# Patient Record
Sex: FEMALE | Race: WHITE | NOT HISPANIC OR LATINO | Employment: OTHER | URBAN - METROPOLITAN AREA
[De-identification: names, ages, dates, MRNs, and addresses within clinical notes are randomized per-mention and may not be internally consistent; named-entity substitution may affect disease eponyms.]

---

## 2017-01-06 ENCOUNTER — COMMUNICATION - HEALTHEAST (OUTPATIENT)
Dept: INTERNAL MEDICINE | Facility: CLINIC | Age: 77
End: 2017-01-06

## 2017-01-11 ENCOUNTER — COMMUNICATION - HEALTHEAST (OUTPATIENT)
Dept: INTERNAL MEDICINE | Facility: CLINIC | Age: 77
End: 2017-01-11

## 2017-01-30 ENCOUNTER — COMMUNICATION - HEALTHEAST (OUTPATIENT)
Dept: INTERNAL MEDICINE | Facility: CLINIC | Age: 77
End: 2017-01-30

## 2017-02-06 ENCOUNTER — COMMUNICATION - HEALTHEAST (OUTPATIENT)
Dept: INTERNAL MEDICINE | Facility: CLINIC | Age: 77
End: 2017-02-06

## 2017-02-20 ENCOUNTER — AMBULATORY - HEALTHEAST (OUTPATIENT)
Dept: NURSING | Facility: CLINIC | Age: 77
End: 2017-02-20

## 2017-02-20 ENCOUNTER — AMBULATORY - HEALTHEAST (OUTPATIENT)
Dept: LAB | Facility: CLINIC | Age: 77
End: 2017-02-20

## 2017-02-20 ENCOUNTER — COMMUNICATION - HEALTHEAST (OUTPATIENT)
Dept: INTERNAL MEDICINE | Facility: CLINIC | Age: 77
End: 2017-02-20

## 2017-02-20 DIAGNOSIS — M81.0 OSTEOPOROSIS: ICD-10-CM

## 2017-03-08 ENCOUNTER — COMMUNICATION - HEALTHEAST (OUTPATIENT)
Dept: INTERNAL MEDICINE | Facility: CLINIC | Age: 77
End: 2017-03-08

## 2017-03-08 DIAGNOSIS — M81.0 OSTEOPOROSIS, UNSPECIFIED: ICD-10-CM

## 2017-04-10 ENCOUNTER — COMMUNICATION - HEALTHEAST (OUTPATIENT)
Dept: INTERNAL MEDICINE | Facility: CLINIC | Age: 77
End: 2017-04-10

## 2017-04-10 DIAGNOSIS — E55.9 VITAMIN D DEFICIENCY: ICD-10-CM

## 2017-04-10 DIAGNOSIS — R53.83 FATIGUE, UNSPECIFIED TYPE: ICD-10-CM

## 2017-04-12 ENCOUNTER — OFFICE VISIT - HEALTHEAST (OUTPATIENT)
Dept: INTERNAL MEDICINE | Facility: CLINIC | Age: 77
End: 2017-04-12

## 2017-04-12 DIAGNOSIS — K80.12 CALCULUS OF GALLBLADDER WITH ACUTE ON CHRONIC CHOLECYSTITIS WITHOUT OBSTRUCTION: ICD-10-CM

## 2017-04-12 DIAGNOSIS — R53.83 FATIGUE, UNSPECIFIED TYPE: ICD-10-CM

## 2017-04-12 DIAGNOSIS — M81.0 OSTEOPOROSIS: ICD-10-CM

## 2017-04-12 DIAGNOSIS — E55.9 VITAMIN D DEFICIENCY: ICD-10-CM

## 2017-04-12 DIAGNOSIS — R10.11 RIGHT UPPER QUADRANT ABDOMINAL PAIN: ICD-10-CM

## 2017-04-12 DIAGNOSIS — Z00.00 HEALTHCARE MAINTENANCE: ICD-10-CM

## 2017-04-12 LAB
CHOLEST SERPL-MCNC: 179 MG/DL
FASTING STATUS PATIENT QL REPORTED: NORMAL
HDLC SERPL-MCNC: 66 MG/DL
LDLC SERPL CALC-MCNC: 101 MG/DL
TRIGL SERPL-MCNC: 62 MG/DL

## 2017-04-12 ASSESSMENT — MIFFLIN-ST. JEOR: SCORE: 898.82

## 2017-04-14 ENCOUNTER — COMMUNICATION - HEALTHEAST (OUTPATIENT)
Dept: INTERNAL MEDICINE | Facility: CLINIC | Age: 77
End: 2017-04-14

## 2017-04-19 ENCOUNTER — RECORDS - HEALTHEAST (OUTPATIENT)
Dept: ADMINISTRATIVE | Facility: OTHER | Age: 77
End: 2017-04-19

## 2017-04-19 ENCOUNTER — RECORDS - HEALTHEAST (OUTPATIENT)
Dept: BONE DENSITY | Facility: CLINIC | Age: 77
End: 2017-04-19

## 2017-04-19 DIAGNOSIS — M81.0 AGE-RELATED OSTEOPOROSIS WITHOUT CURRENT PATHOLOGICAL FRACTURE: ICD-10-CM

## 2017-04-21 ENCOUNTER — COMMUNICATION - HEALTHEAST (OUTPATIENT)
Dept: INTERNAL MEDICINE | Facility: CLINIC | Age: 77
End: 2017-04-21

## 2017-04-21 DIAGNOSIS — R26.89 IMBALANCE: ICD-10-CM

## 2017-04-28 ENCOUNTER — COMMUNICATION - HEALTHEAST (OUTPATIENT)
Dept: INTERNAL MEDICINE | Facility: CLINIC | Age: 77
End: 2017-04-28

## 2017-05-01 ENCOUNTER — RECORDS - HEALTHEAST (OUTPATIENT)
Dept: ADMINISTRATIVE | Facility: OTHER | Age: 77
End: 2017-05-01

## 2017-05-03 ENCOUNTER — COMMUNICATION - HEALTHEAST (OUTPATIENT)
Dept: INTERNAL MEDICINE | Facility: CLINIC | Age: 77
End: 2017-05-03

## 2017-05-03 ENCOUNTER — OFFICE VISIT - HEALTHEAST (OUTPATIENT)
Dept: PHYSICAL THERAPY | Facility: REHABILITATION | Age: 77
End: 2017-05-03

## 2017-05-03 DIAGNOSIS — R26.89 IMBALANCE: ICD-10-CM

## 2017-05-08 ENCOUNTER — RECORDS - HEALTHEAST (OUTPATIENT)
Dept: ADMINISTRATIVE | Facility: OTHER | Age: 77
End: 2017-05-08

## 2017-05-08 ENCOUNTER — COMMUNICATION - HEALTHEAST (OUTPATIENT)
Dept: INTERNAL MEDICINE | Facility: CLINIC | Age: 77
End: 2017-05-08

## 2017-05-09 ENCOUNTER — AMBULATORY - HEALTHEAST (OUTPATIENT)
Dept: INTERNAL MEDICINE | Facility: CLINIC | Age: 77
End: 2017-05-09

## 2017-05-09 ENCOUNTER — COMMUNICATION - HEALTHEAST (OUTPATIENT)
Dept: INTERNAL MEDICINE | Facility: CLINIC | Age: 77
End: 2017-05-09

## 2017-05-09 DIAGNOSIS — F41.9 ANXIETY: ICD-10-CM

## 2017-05-15 ENCOUNTER — COMMUNICATION - HEALTHEAST (OUTPATIENT)
Dept: SCHEDULING | Facility: CLINIC | Age: 77
End: 2017-05-15

## 2017-05-15 ENCOUNTER — OFFICE VISIT - HEALTHEAST (OUTPATIENT)
Dept: INTERNAL MEDICINE | Facility: CLINIC | Age: 77
End: 2017-05-15

## 2017-05-15 DIAGNOSIS — K80.12 CALCULUS OF GALLBLADDER WITH ACUTE ON CHRONIC CHOLECYSTITIS WITHOUT OBSTRUCTION: ICD-10-CM

## 2017-05-15 DIAGNOSIS — Z00.00 HEALTHCARE MAINTENANCE: ICD-10-CM

## 2017-05-15 DIAGNOSIS — R00.2 PALPITATIONS: ICD-10-CM

## 2017-05-15 ASSESSMENT — MIFFLIN-ST. JEOR: SCORE: 896.55

## 2017-05-16 LAB
ATRIAL RATE - MUSE: 61 BPM
DIASTOLIC BLOOD PRESSURE - MUSE: NORMAL MMHG
INTERPRETATION ECG - MUSE: NORMAL
P AXIS - MUSE: 43 DEGREES
PR INTERVAL - MUSE: 178 MS
QRS DURATION - MUSE: 84 MS
QT - MUSE: 388 MS
QTC - MUSE: 390 MS
R AXIS - MUSE: 17 DEGREES
SYSTOLIC BLOOD PRESSURE - MUSE: NORMAL MMHG
T AXIS - MUSE: 47 DEGREES
VENTRICULAR RATE- MUSE: 61 BPM

## 2017-05-17 ENCOUNTER — HOSPITAL ENCOUNTER (OUTPATIENT)
Dept: NUCLEAR MEDICINE | Facility: CLINIC | Age: 77
Discharge: HOME | End: 2017-05-17
Attending: SURGERY

## 2017-05-17 DIAGNOSIS — K80.20 CALCULUS OF GALLBLADDER WITHOUT CHOLECYSTITIS WITHOUT OBSTRUCTION: ICD-10-CM

## 2017-05-18 ENCOUNTER — OFFICE VISIT - HEALTHEAST (OUTPATIENT)
Dept: CARDIOLOGY | Facility: CLINIC | Age: 77
End: 2017-05-18

## 2017-05-18 DIAGNOSIS — R00.2 PALPITATION: ICD-10-CM

## 2017-05-18 DIAGNOSIS — I48.92 ATRIAL FLUTTER (H): ICD-10-CM

## 2017-05-18 LAB
ATRIAL RATE - MUSE: 64 BPM
DIASTOLIC BLOOD PRESSURE - MUSE: NORMAL MMHG
INTERPRETATION ECG - MUSE: NORMAL
P AXIS - MUSE: 58 DEGREES
PR INTERVAL - MUSE: 176 MS
QRS DURATION - MUSE: 82 MS
QT - MUSE: 388 MS
QTC - MUSE: 400 MS
R AXIS - MUSE: 7 DEGREES
SYSTOLIC BLOOD PRESSURE - MUSE: NORMAL MMHG
T AXIS - MUSE: 40 DEGREES
VENTRICULAR RATE- MUSE: 64 BPM

## 2017-05-18 ASSESSMENT — MIFFLIN-ST. JEOR: SCORE: 895.19

## 2017-05-22 ENCOUNTER — HOSPITAL ENCOUNTER (OUTPATIENT)
Dept: CARDIOLOGY | Facility: CLINIC | Age: 77
Discharge: HOME | End: 2017-05-22
Attending: INTERNAL MEDICINE

## 2017-05-22 DIAGNOSIS — R00.2 PALPITATION: ICD-10-CM

## 2017-05-22 DIAGNOSIS — I48.92 ATRIAL FLUTTER (H): ICD-10-CM

## 2017-05-29 ENCOUNTER — COMMUNICATION - HEALTHEAST (OUTPATIENT)
Dept: CARDIOLOGY | Facility: CLINIC | Age: 77
End: 2017-05-29

## 2017-05-29 ENCOUNTER — COMMUNICATION - HEALTHEAST (OUTPATIENT)
Dept: INTERNAL MEDICINE | Facility: CLINIC | Age: 77
End: 2017-05-29

## 2017-05-30 ENCOUNTER — COMMUNICATION - HEALTHEAST (OUTPATIENT)
Dept: INTERNAL MEDICINE | Facility: CLINIC | Age: 77
End: 2017-05-30

## 2017-05-30 ENCOUNTER — AMBULATORY - HEALTHEAST (OUTPATIENT)
Dept: INTERNAL MEDICINE | Facility: CLINIC | Age: 77
End: 2017-05-30

## 2017-05-31 ENCOUNTER — COMMUNICATION - HEALTHEAST (OUTPATIENT)
Dept: CARDIOLOGY | Facility: CLINIC | Age: 77
End: 2017-05-31

## 2017-05-31 ENCOUNTER — RECORDS - HEALTHEAST (OUTPATIENT)
Dept: ADMINISTRATIVE | Facility: OTHER | Age: 77
End: 2017-05-31

## 2017-05-31 DIAGNOSIS — R00.2 PALPITATIONS: ICD-10-CM

## 2017-06-02 ENCOUNTER — COMMUNICATION - HEALTHEAST (OUTPATIENT)
Dept: CARDIOLOGY | Facility: CLINIC | Age: 77
End: 2017-06-02

## 2017-06-02 DIAGNOSIS — R00.2 PALPITATIONS: ICD-10-CM

## 2017-06-04 ENCOUNTER — COMMUNICATION - HEALTHEAST (OUTPATIENT)
Dept: CARDIOLOGY | Facility: CLINIC | Age: 77
End: 2017-06-04

## 2017-06-05 ENCOUNTER — COMMUNICATION - HEALTHEAST (OUTPATIENT)
Dept: CARDIOLOGY | Facility: CLINIC | Age: 77
End: 2017-06-05

## 2017-06-10 ENCOUNTER — COMMUNICATION - HEALTHEAST (OUTPATIENT)
Dept: CARDIOLOGY | Facility: CLINIC | Age: 77
End: 2017-06-10

## 2017-06-13 ENCOUNTER — COMMUNICATION - HEALTHEAST (OUTPATIENT)
Dept: CARDIOLOGY | Facility: CLINIC | Age: 77
End: 2017-06-13

## 2017-06-23 ENCOUNTER — COMMUNICATION - HEALTHEAST (OUTPATIENT)
Dept: CARDIOLOGY | Facility: CLINIC | Age: 77
End: 2017-06-23

## 2017-06-27 ENCOUNTER — COMMUNICATION - HEALTHEAST (OUTPATIENT)
Dept: CARDIOLOGY | Facility: CLINIC | Age: 77
End: 2017-06-27

## 2017-07-12 ENCOUNTER — COMMUNICATION - HEALTHEAST (OUTPATIENT)
Dept: INTERNAL MEDICINE | Facility: CLINIC | Age: 77
End: 2017-07-12

## 2017-07-12 DIAGNOSIS — M81.0 OSTEOPOROSIS, UNSPECIFIED: ICD-10-CM

## 2017-07-14 ENCOUNTER — HOSPITAL ENCOUNTER (OUTPATIENT)
Dept: CARDIOLOGY | Facility: CLINIC | Age: 77
Discharge: HOME | End: 2017-07-14
Attending: INTERNAL MEDICINE

## 2017-07-14 DIAGNOSIS — R00.2 PALPITATIONS: ICD-10-CM

## 2017-07-31 ENCOUNTER — COMMUNICATION - HEALTHEAST (OUTPATIENT)
Dept: INTERNAL MEDICINE | Facility: CLINIC | Age: 77
End: 2017-07-31

## 2017-07-31 ENCOUNTER — AMBULATORY - HEALTHEAST (OUTPATIENT)
Dept: INTERNAL MEDICINE | Facility: CLINIC | Age: 77
End: 2017-07-31

## 2017-07-31 DIAGNOSIS — R19.7 DIARRHEA: ICD-10-CM

## 2017-08-06 ENCOUNTER — RECORDS - HEALTHEAST (OUTPATIENT)
Dept: ADMINISTRATIVE | Facility: OTHER | Age: 77
End: 2017-08-06

## 2017-08-21 ENCOUNTER — AMBULATORY - HEALTHEAST (OUTPATIENT)
Dept: NURSING | Facility: CLINIC | Age: 77
End: 2017-08-21

## 2017-09-29 ENCOUNTER — OFFICE VISIT - HEALTHEAST (OUTPATIENT)
Dept: CARDIOLOGY | Facility: CLINIC | Age: 77
End: 2017-09-29

## 2017-09-29 DIAGNOSIS — R42 DIZZINESS AND GIDDINESS: ICD-10-CM

## 2017-09-29 DIAGNOSIS — R00.2 PALPITATIONS: ICD-10-CM

## 2017-09-29 DIAGNOSIS — I47.19 ECTOPIC ATRIAL TACHYCARDIA (H): ICD-10-CM

## 2017-09-29 ASSESSMENT — MIFFLIN-ST. JEOR: SCORE: 906.08

## 2017-10-28 ENCOUNTER — COMMUNICATION - HEALTHEAST (OUTPATIENT)
Dept: INTERNAL MEDICINE | Facility: CLINIC | Age: 77
End: 2017-10-28

## 2017-10-28 DIAGNOSIS — M81.0 OSTEOPOROSIS: ICD-10-CM

## 2017-11-15 ENCOUNTER — RECORDS - HEALTHEAST (OUTPATIENT)
Dept: ADMINISTRATIVE | Facility: OTHER | Age: 77
End: 2017-11-15

## 2018-01-30 ENCOUNTER — COMMUNICATION - HEALTHEAST (OUTPATIENT)
Dept: SCHEDULING | Facility: CLINIC | Age: 78
End: 2018-01-30

## 2018-01-30 DIAGNOSIS — G47.00 INSOMNIA: ICD-10-CM

## 2018-02-01 ENCOUNTER — COMMUNICATION - HEALTHEAST (OUTPATIENT)
Dept: INTERNAL MEDICINE | Facility: CLINIC | Age: 78
End: 2018-02-01

## 2018-02-01 DIAGNOSIS — G47.00 INSOMNIA: ICD-10-CM

## 2018-02-05 ENCOUNTER — COMMUNICATION - HEALTHEAST (OUTPATIENT)
Dept: INTERNAL MEDICINE | Facility: CLINIC | Age: 78
End: 2018-02-05

## 2018-04-12 ENCOUNTER — COMMUNICATION - HEALTHEAST (OUTPATIENT)
Dept: INTERNAL MEDICINE | Facility: CLINIC | Age: 78
End: 2018-04-12

## 2018-04-12 DIAGNOSIS — I25.10 CAD (CORONARY ARTERY DISEASE): ICD-10-CM

## 2018-06-04 ENCOUNTER — OFFICE VISIT - HEALTHEAST (OUTPATIENT)
Dept: INTERNAL MEDICINE | Facility: CLINIC | Age: 78
End: 2018-06-04

## 2018-06-04 ENCOUNTER — COMMUNICATION - HEALTHEAST (OUTPATIENT)
Dept: INTERNAL MEDICINE | Facility: CLINIC | Age: 78
End: 2018-06-04

## 2018-06-04 DIAGNOSIS — R41.3 MEMORY CHANGES: ICD-10-CM

## 2018-06-04 DIAGNOSIS — R00.2 PALPITATION: ICD-10-CM

## 2018-06-04 DIAGNOSIS — M81.0 AGE-RELATED OSTEOPOROSIS WITHOUT CURRENT PATHOLOGICAL FRACTURE: ICD-10-CM

## 2018-06-04 LAB
ALBUMIN SERPL-MCNC: 3.9 G/DL (ref 3.5–5)
ALP SERPL-CCNC: 64 U/L (ref 45–120)
ALT SERPL W P-5'-P-CCNC: 13 U/L (ref 0–45)
ANION GAP SERPL CALCULATED.3IONS-SCNC: 8 MMOL/L (ref 5–18)
AST SERPL W P-5'-P-CCNC: 20 U/L (ref 0–40)
BILIRUB SERPL-MCNC: 0.4 MG/DL (ref 0–1)
BUN SERPL-MCNC: 13 MG/DL (ref 8–28)
CALCIUM SERPL-MCNC: 9.6 MG/DL (ref 8.5–10.5)
CHLORIDE BLD-SCNC: 103 MMOL/L (ref 98–107)
CO2 SERPL-SCNC: 28 MMOL/L (ref 22–31)
CREAT SERPL-MCNC: 0.68 MG/DL (ref 0.6–1.1)
ERYTHROCYTE [DISTWIDTH] IN BLOOD BY AUTOMATED COUNT: 12.4 % (ref 11–14.5)
ERYTHROCYTE [SEDIMENTATION RATE] IN BLOOD BY WESTERGREN METHOD: 4 MM/HR (ref 0–20)
GFR SERPL CREATININE-BSD FRML MDRD: >60 ML/MIN/1.73M2
GLUCOSE BLD-MCNC: 81 MG/DL (ref 70–125)
HCT VFR BLD AUTO: 39.7 % (ref 35–47)
HGB BLD-MCNC: 13.6 G/DL (ref 12–16)
MCH RBC QN AUTO: 30.9 PG (ref 27–34)
MCHC RBC AUTO-ENTMCNC: 34.1 G/DL (ref 32–36)
MCV RBC AUTO: 91 FL (ref 80–100)
PLATELET # BLD AUTO: 182 THOU/UL (ref 140–440)
PMV BLD AUTO: 7.8 FL (ref 7–10)
POTASSIUM BLD-SCNC: 4.1 MMOL/L (ref 3.5–5)
PROT SERPL-MCNC: 6.3 G/DL (ref 6–8)
RBC # BLD AUTO: 4.38 MILL/UL (ref 3.8–5.4)
SODIUM SERPL-SCNC: 139 MMOL/L (ref 136–145)
TSH SERPL DL<=0.005 MIU/L-ACNC: 2.3 UIU/ML (ref 0.3–5)
VIT B12 SERPL-MCNC: 418 PG/ML (ref 213–816)
WBC: 5.1 THOU/UL (ref 4–11)

## 2018-06-05 LAB
25(OH)D3 SERPL-MCNC: 55.7 NG/ML (ref 30–80)
25(OH)D3 SERPL-MCNC: 55.7 NG/ML (ref 30–80)

## 2018-06-07 ENCOUNTER — COMMUNICATION - HEALTHEAST (OUTPATIENT)
Dept: CARDIOLOGY | Facility: CLINIC | Age: 78
End: 2018-06-07

## 2018-06-07 DIAGNOSIS — R00.2 PALPITATIONS: ICD-10-CM

## 2018-06-13 ENCOUNTER — COMMUNICATION - HEALTHEAST (OUTPATIENT)
Dept: INTERNAL MEDICINE | Facility: CLINIC | Age: 78
End: 2018-06-13

## 2018-06-13 DIAGNOSIS — M81.0 OSTEOPOROSIS: ICD-10-CM

## 2018-07-15 ENCOUNTER — COMMUNICATION - HEALTHEAST (OUTPATIENT)
Dept: INTERNAL MEDICINE | Facility: CLINIC | Age: 78
End: 2018-07-15

## 2018-07-16 ENCOUNTER — COMMUNICATION - HEALTHEAST (OUTPATIENT)
Dept: INTERNAL MEDICINE | Facility: CLINIC | Age: 78
End: 2018-07-16

## 2018-07-19 ENCOUNTER — RECORDS - HEALTHEAST (OUTPATIENT)
Dept: ADMINISTRATIVE | Facility: OTHER | Age: 78
End: 2018-07-19

## 2018-08-03 ENCOUNTER — COMMUNICATION - HEALTHEAST (OUTPATIENT)
Dept: INTERNAL MEDICINE | Facility: CLINIC | Age: 78
End: 2018-08-03

## 2018-10-29 ENCOUNTER — COMMUNICATION - HEALTHEAST (OUTPATIENT)
Dept: INTERNAL MEDICINE | Facility: CLINIC | Age: 78
End: 2018-10-29

## 2018-10-29 DIAGNOSIS — M81.0 OSTEOPOROSIS: ICD-10-CM

## 2018-11-10 ENCOUNTER — RECORDS - HEALTHEAST (OUTPATIENT)
Dept: ADMINISTRATIVE | Facility: OTHER | Age: 78
End: 2018-11-10

## 2018-11-18 ENCOUNTER — RECORDS - HEALTHEAST (OUTPATIENT)
Dept: ADMINISTRATIVE | Facility: OTHER | Age: 78
End: 2018-11-18

## 2019-01-09 ENCOUNTER — COMMUNICATION - HEALTHEAST (OUTPATIENT)
Dept: CARDIOLOGY | Facility: CLINIC | Age: 79
End: 2019-01-09

## 2019-01-09 DIAGNOSIS — R00.2 PALPITATIONS: ICD-10-CM

## 2019-02-04 ENCOUNTER — COMMUNICATION - HEALTHEAST (OUTPATIENT)
Dept: INTERNAL MEDICINE | Facility: CLINIC | Age: 79
End: 2019-02-04

## 2019-02-04 DIAGNOSIS — M81.0 OSTEOPOROSIS: ICD-10-CM

## 2019-02-22 ENCOUNTER — RECORDS - HEALTHEAST (OUTPATIENT)
Dept: ADMINISTRATIVE | Facility: OTHER | Age: 79
End: 2019-02-22

## 2019-03-07 ENCOUNTER — RECORDS - HEALTHEAST (OUTPATIENT)
Dept: ADMINISTRATIVE | Facility: OTHER | Age: 79
End: 2019-03-07

## 2019-04-17 ENCOUNTER — COMMUNICATION - HEALTHEAST (OUTPATIENT)
Dept: CARDIOLOGY | Facility: CLINIC | Age: 79
End: 2019-04-17

## 2019-04-23 ENCOUNTER — COMMUNICATION - HEALTHEAST (OUTPATIENT)
Dept: CARDIOLOGY | Facility: CLINIC | Age: 79
End: 2019-04-23

## 2019-04-23 ENCOUNTER — AMBULATORY - HEALTHEAST (OUTPATIENT)
Dept: CARDIOLOGY | Facility: CLINIC | Age: 79
End: 2019-04-23

## 2019-04-23 ENCOUNTER — RECORDS - HEALTHEAST (OUTPATIENT)
Dept: ADMINISTRATIVE | Facility: OTHER | Age: 79
End: 2019-04-23

## 2019-04-23 DIAGNOSIS — R00.2 PALPITATIONS: ICD-10-CM

## 2019-04-24 ENCOUNTER — OFFICE VISIT - HEALTHEAST (OUTPATIENT)
Dept: INTERNAL MEDICINE | Facility: CLINIC | Age: 79
End: 2019-04-24

## 2019-04-24 DIAGNOSIS — H02.403 PTOSIS OF BOTH EYELIDS: ICD-10-CM

## 2019-04-24 DIAGNOSIS — M81.0 AGE-RELATED OSTEOPOROSIS WITHOUT CURRENT PATHOLOGICAL FRACTURE: ICD-10-CM

## 2019-04-24 DIAGNOSIS — Z01.818 PRE-OPERATIVE EXAMINATION FOR INTERNAL MEDICINE: ICD-10-CM

## 2019-04-24 DIAGNOSIS — I47.19 ECTOPIC ATRIAL TACHYCARDIA (H): ICD-10-CM

## 2019-04-24 LAB
ANION GAP SERPL CALCULATED.3IONS-SCNC: 11 MMOL/L (ref 5–18)
ATRIAL RATE - MUSE: 64 BPM
BUN SERPL-MCNC: 17 MG/DL (ref 8–28)
CALCIUM SERPL-MCNC: 9.7 MG/DL (ref 8.5–10.5)
CHLORIDE BLD-SCNC: 102 MMOL/L (ref 98–107)
CO2 SERPL-SCNC: 24 MMOL/L (ref 22–31)
CREAT SERPL-MCNC: 0.66 MG/DL (ref 0.6–1.1)
DIASTOLIC BLOOD PRESSURE - MUSE: NORMAL MMHG
ERYTHROCYTE [DISTWIDTH] IN BLOOD BY AUTOMATED COUNT: 11.6 % (ref 11–14.5)
GFR SERPL CREATININE-BSD FRML MDRD: >60 ML/MIN/1.73M2
GLUCOSE BLD-MCNC: 86 MG/DL (ref 70–125)
HCT VFR BLD AUTO: 40 % (ref 35–47)
HGB BLD-MCNC: 13.5 G/DL (ref 12–16)
INTERPRETATION ECG - MUSE: NORMAL
MCH RBC QN AUTO: 30.8 PG (ref 27–34)
MCHC RBC AUTO-ENTMCNC: 33.7 G/DL (ref 32–36)
MCV RBC AUTO: 91 FL (ref 80–100)
P AXIS - MUSE: 21 DEGREES
PLATELET # BLD AUTO: 199 THOU/UL (ref 140–440)
PMV BLD AUTO: 7.6 FL (ref 7–10)
POTASSIUM BLD-SCNC: 3.9 MMOL/L (ref 3.5–5)
PR INTERVAL - MUSE: 182 MS
QRS DURATION - MUSE: 86 MS
QT - MUSE: 392 MS
QTC - MUSE: 404 MS
R AXIS - MUSE: -12 DEGREES
RBC # BLD AUTO: 4.38 MILL/UL (ref 3.8–5.4)
SODIUM SERPL-SCNC: 137 MMOL/L (ref 136–145)
SYSTOLIC BLOOD PRESSURE - MUSE: NORMAL MMHG
T AXIS - MUSE: 42 DEGREES
VENTRICULAR RATE- MUSE: 64 BPM
WBC: 5.6 THOU/UL (ref 4–11)

## 2019-04-24 ASSESSMENT — MIFFLIN-ST. JEOR: SCORE: 909.93

## 2019-04-30 ENCOUNTER — OFFICE VISIT - HEALTHEAST (OUTPATIENT)
Dept: CARDIOLOGY | Facility: CLINIC | Age: 79
End: 2019-04-30

## 2019-04-30 ENCOUNTER — COMMUNICATION - HEALTHEAST (OUTPATIENT)
Dept: INTERNAL MEDICINE | Facility: CLINIC | Age: 79
End: 2019-04-30

## 2019-04-30 DIAGNOSIS — I47.19 ECTOPIC ATRIAL TACHYCARDIA (H): ICD-10-CM

## 2019-04-30 DIAGNOSIS — R00.2 PALPITATIONS: ICD-10-CM

## 2019-04-30 ASSESSMENT — MIFFLIN-ST. JEOR: SCORE: 902.9

## 2019-05-06 ENCOUNTER — RECORDS - HEALTHEAST (OUTPATIENT)
Dept: ADMINISTRATIVE | Facility: OTHER | Age: 79
End: 2019-05-06

## 2019-05-06 ENCOUNTER — RECORDS - HEALTHEAST (OUTPATIENT)
Dept: BONE DENSITY | Facility: CLINIC | Age: 79
End: 2019-05-06

## 2019-05-06 DIAGNOSIS — M81.0 AGE-RELATED OSTEOPOROSIS WITHOUT CURRENT PATHOLOGICAL FRACTURE: ICD-10-CM

## 2019-05-15 ENCOUNTER — RECORDS - HEALTHEAST (OUTPATIENT)
Dept: ADMINISTRATIVE | Facility: OTHER | Age: 79
End: 2019-05-15

## 2019-05-16 ENCOUNTER — COMMUNICATION - HEALTHEAST (OUTPATIENT)
Dept: INTERNAL MEDICINE | Facility: CLINIC | Age: 79
End: 2019-05-16

## 2019-05-16 DIAGNOSIS — M81.0 AGE-RELATED OSTEOPOROSIS WITHOUT CURRENT PATHOLOGICAL FRACTURE: ICD-10-CM

## 2019-05-21 ENCOUNTER — COMMUNICATION - HEALTHEAST (OUTPATIENT)
Dept: INTERNAL MEDICINE | Facility: CLINIC | Age: 79
End: 2019-05-21

## 2019-05-21 DIAGNOSIS — R41.3 MEMORY CHANGES: ICD-10-CM

## 2019-05-29 ENCOUNTER — AMBULATORY - HEALTHEAST (OUTPATIENT)
Dept: PHARMACY | Facility: CLINIC | Age: 79
End: 2019-05-29

## 2019-05-29 DIAGNOSIS — M81.0 AGE-RELATED OSTEOPOROSIS WITHOUT CURRENT PATHOLOGICAL FRACTURE: ICD-10-CM

## 2019-05-30 ENCOUNTER — COMMUNICATION - HEALTHEAST (OUTPATIENT)
Dept: INTERNAL MEDICINE | Facility: CLINIC | Age: 79
End: 2019-05-30

## 2019-08-02 ENCOUNTER — HOSPITAL ENCOUNTER (OUTPATIENT)
Dept: NEUROLOGY | Facility: CLINIC | Age: 79
Setting detail: THERAPIES SERIES
Discharge: STILL ADMITTED OR ADMINISTRATIVE | End: 2019-08-02
Attending: INTERNAL MEDICINE

## 2019-08-02 ENCOUNTER — HOSPITAL ENCOUNTER (OUTPATIENT)
Dept: CT IMAGING | Facility: CLINIC | Age: 79
Discharge: HOME | End: 2019-08-02
Attending: PSYCHIATRY & NEUROLOGY

## 2019-08-02 DIAGNOSIS — G31.84 MCI (MILD COGNITIVE IMPAIRMENT): ICD-10-CM

## 2019-08-05 ENCOUNTER — COMMUNICATION - HEALTHEAST (OUTPATIENT)
Dept: SCHEDULING | Facility: CLINIC | Age: 79
End: 2019-08-05

## 2019-08-12 ENCOUNTER — OFFICE VISIT - HEALTHEAST (OUTPATIENT)
Dept: PHARMACY | Facility: CLINIC | Age: 79
End: 2019-08-12

## 2019-08-12 DIAGNOSIS — M81.0 AGE-RELATED OSTEOPOROSIS WITHOUT CURRENT PATHOLOGICAL FRACTURE: ICD-10-CM

## 2019-08-19 ENCOUNTER — AMBULATORY - HEALTHEAST (OUTPATIENT)
Dept: NURSING | Facility: CLINIC | Age: 79
End: 2019-08-19

## 2019-08-22 ENCOUNTER — HOSPITAL ENCOUNTER (OUTPATIENT)
Dept: NEUROLOGY | Facility: CLINIC | Age: 79
Setting detail: THERAPIES SERIES
Discharge: STILL ADMITTED OR ADMINISTRATIVE | End: 2019-08-22
Attending: INTERNAL MEDICINE

## 2019-08-22 DIAGNOSIS — G30.9 MILD NEUROCOGNITIVE DISORDER DUE TO ALZHEIMER'S DISEASE (H): ICD-10-CM

## 2019-08-22 DIAGNOSIS — F06.70 MILD NEUROCOGNITIVE DISORDER DUE TO ALZHEIMER'S DISEASE (H): ICD-10-CM

## 2019-09-10 ENCOUNTER — RECORDS - HEALTHEAST (OUTPATIENT)
Dept: ADMINISTRATIVE | Facility: OTHER | Age: 79
End: 2019-09-10

## 2019-09-20 ENCOUNTER — COMMUNICATION - HEALTHEAST (OUTPATIENT)
Dept: SCHEDULING | Facility: CLINIC | Age: 79
End: 2019-09-20

## 2019-09-20 DIAGNOSIS — R42 VERTIGO: ICD-10-CM

## 2019-10-04 ENCOUNTER — HOSPITAL ENCOUNTER (OUTPATIENT)
Dept: NEUROLOGY | Facility: CLINIC | Age: 79
Setting detail: THERAPIES SERIES
Discharge: STILL ADMITTED OR ADMINISTRATIVE | End: 2019-10-04
Attending: INTERNAL MEDICINE

## 2019-10-04 DIAGNOSIS — G30.1 LATE ONSET ALZHEIMER'S DISEASE WITHOUT BEHAVIORAL DISTURBANCE (H): ICD-10-CM

## 2019-10-04 DIAGNOSIS — F02.80 LATE ONSET ALZHEIMER'S DISEASE WITHOUT BEHAVIORAL DISTURBANCE (H): ICD-10-CM

## 2019-10-04 DIAGNOSIS — R00.2 PALPITATIONS: ICD-10-CM

## 2019-10-04 LAB
TSH SERPL DL<=0.005 MIU/L-ACNC: 1.22 UIU/ML (ref 0.3–5)
VIT B12 SERPL-MCNC: 543 PG/ML (ref 213–816)

## 2019-10-07 ENCOUNTER — AMBULATORY - HEALTHEAST (OUTPATIENT)
Dept: NEUROLOGY | Facility: CLINIC | Age: 79
End: 2019-10-07

## 2019-10-07 DIAGNOSIS — F06.70 MILD NEUROCOGNITIVE DISORDER DUE TO ALZHEIMER'S DISEASE (H): ICD-10-CM

## 2019-10-07 DIAGNOSIS — G30.9 MILD NEUROCOGNITIVE DISORDER DUE TO ALZHEIMER'S DISEASE (H): ICD-10-CM

## 2019-10-07 DIAGNOSIS — F02.80 LATE ONSET ALZHEIMER'S DISEASE WITHOUT BEHAVIORAL DISTURBANCE (H): ICD-10-CM

## 2019-10-07 DIAGNOSIS — G30.1 LATE ONSET ALZHEIMER'S DISEASE WITHOUT BEHAVIORAL DISTURBANCE (H): ICD-10-CM

## 2019-10-11 ENCOUNTER — OFFICE VISIT - HEALTHEAST (OUTPATIENT)
Dept: OCCUPATIONAL THERAPY | Facility: REHABILITATION | Age: 79
End: 2019-10-11

## 2019-10-11 DIAGNOSIS — R26.81 UNSTEADINESS ON FEET: ICD-10-CM

## 2019-10-11 DIAGNOSIS — Z78.9 DECREASED ACTIVITIES OF DAILY LIVING (ADL): ICD-10-CM

## 2019-10-11 DIAGNOSIS — H81.11 BENIGN PAROXYSMAL POSITIONAL VERTIGO OF RIGHT EAR: ICD-10-CM

## 2019-10-14 ENCOUNTER — COMMUNICATION - HEALTHEAST (OUTPATIENT)
Dept: NEUROLOGY | Facility: CLINIC | Age: 79
End: 2019-10-14

## 2019-10-31 ENCOUNTER — OFFICE VISIT - HEALTHEAST (OUTPATIENT)
Dept: OCCUPATIONAL THERAPY | Facility: REHABILITATION | Age: 79
End: 2019-10-31

## 2019-10-31 DIAGNOSIS — Z78.9 DECREASED ACTIVITIES OF DAILY LIVING (ADL): ICD-10-CM

## 2019-10-31 DIAGNOSIS — H81.11 BENIGN PAROXYSMAL POSITIONAL VERTIGO OF RIGHT EAR: ICD-10-CM

## 2019-10-31 DIAGNOSIS — R26.81 UNSTEADINESS ON FEET: ICD-10-CM

## 2019-12-06 ENCOUNTER — HOSPITAL ENCOUNTER (OUTPATIENT)
Dept: OCCUPATIONAL THERAPY | Age: 79
Setting detail: THERAPIES SERIES
Discharge: STILL ADMITTED OR ADMINISTRATIVE | End: 2019-12-06
Attending: INTERNAL MEDICINE

## 2019-12-06 ENCOUNTER — HOSPITAL ENCOUNTER (OUTPATIENT)
Dept: NEUROLOGY | Facility: CLINIC | Age: 79
Setting detail: THERAPIES SERIES
Discharge: STILL ADMITTED OR ADMINISTRATIVE | End: 2019-12-06
Attending: INTERNAL MEDICINE

## 2019-12-06 DIAGNOSIS — G30.1 LATE ONSET ALZHEIMER'S DISEASE WITHOUT BEHAVIORAL DISTURBANCE (H): ICD-10-CM

## 2019-12-06 DIAGNOSIS — G30.9 MILD NEUROCOGNITIVE DISORDER DUE TO ALZHEIMER'S DISEASE (H): ICD-10-CM

## 2019-12-06 DIAGNOSIS — F02.80 LATE ONSET ALZHEIMER'S DISEASE WITHOUT BEHAVIORAL DISTURBANCE (H): ICD-10-CM

## 2019-12-06 DIAGNOSIS — F06.70 MILD NEUROCOGNITIVE DISORDER DUE TO ALZHEIMER'S DISEASE (H): ICD-10-CM

## 2020-01-08 ENCOUNTER — COMMUNICATION - HEALTHEAST (OUTPATIENT)
Dept: INTERNAL MEDICINE | Facility: CLINIC | Age: 80
End: 2020-01-08

## 2020-01-08 DIAGNOSIS — F41.9 ANXIETY: ICD-10-CM

## 2020-01-08 DIAGNOSIS — F51.01 PRIMARY INSOMNIA: ICD-10-CM

## 2020-01-10 ENCOUNTER — COMMUNICATION - HEALTHEAST (OUTPATIENT)
Dept: INTERNAL MEDICINE | Facility: CLINIC | Age: 80
End: 2020-01-10

## 2020-01-10 DIAGNOSIS — F51.01 PRIMARY INSOMNIA: ICD-10-CM

## 2020-03-11 ENCOUNTER — COMMUNICATION - HEALTHEAST (OUTPATIENT)
Dept: INTERNAL MEDICINE | Facility: CLINIC | Age: 80
End: 2020-03-11

## 2020-03-11 DIAGNOSIS — Z92.29 PERSONAL HISTORY OF OTHER DRUG THERAPY: ICD-10-CM

## 2020-03-11 DIAGNOSIS — M81.0 OSTEOPOROSIS: ICD-10-CM

## 2020-03-26 ENCOUNTER — COMMUNICATION - HEALTHEAST (OUTPATIENT)
Dept: CARDIOLOGY | Facility: CLINIC | Age: 80
End: 2020-03-26

## 2020-03-30 ENCOUNTER — COMMUNICATION - HEALTHEAST (OUTPATIENT)
Dept: INTERNAL MEDICINE | Facility: CLINIC | Age: 80
End: 2020-03-30

## 2020-03-31 ENCOUNTER — COMMUNICATION - HEALTHEAST (OUTPATIENT)
Dept: INTERNAL MEDICINE | Facility: CLINIC | Age: 80
End: 2020-03-31

## 2020-04-02 ENCOUNTER — COMMUNICATION - HEALTHEAST (OUTPATIENT)
Dept: SCHEDULING | Facility: CLINIC | Age: 80
End: 2020-04-02

## 2020-04-02 DIAGNOSIS — R53.83 FATIGUE: ICD-10-CM

## 2020-04-02 DIAGNOSIS — M81.0 AGE-RELATED OSTEOPOROSIS WITHOUT CURRENT PATHOLOGICAL FRACTURE: ICD-10-CM

## 2020-04-03 ENCOUNTER — COMMUNICATION - HEALTHEAST (OUTPATIENT)
Dept: INTERNAL MEDICINE | Facility: CLINIC | Age: 80
End: 2020-04-03

## 2020-04-03 ENCOUNTER — AMBULATORY - HEALTHEAST (OUTPATIENT)
Dept: LAB | Facility: CLINIC | Age: 80
End: 2020-04-03

## 2020-04-03 DIAGNOSIS — R53.83 FATIGUE: ICD-10-CM

## 2020-04-03 DIAGNOSIS — M81.0 AGE-RELATED OSTEOPOROSIS WITHOUT CURRENT PATHOLOGICAL FRACTURE: ICD-10-CM

## 2020-04-03 LAB
25(OH)D3 SERPL-MCNC: 78.8 NG/ML (ref 30–80)
ALBUMIN SERPL-MCNC: 3.8 G/DL (ref 3.5–5)
ALP SERPL-CCNC: 63 U/L (ref 45–120)
ALT SERPL W P-5'-P-CCNC: 14 U/L (ref 0–45)
ANION GAP SERPL CALCULATED.3IONS-SCNC: 8 MMOL/L (ref 5–18)
AST SERPL W P-5'-P-CCNC: 20 U/L (ref 0–40)
BILIRUB SERPL-MCNC: 0.5 MG/DL (ref 0–1)
BUN SERPL-MCNC: 15 MG/DL (ref 8–28)
CALCIUM SERPL-MCNC: 9.6 MG/DL (ref 8.5–10.5)
CHLORIDE BLD-SCNC: 103 MMOL/L (ref 98–107)
CO2 SERPL-SCNC: 30 MMOL/L (ref 22–31)
CREAT SERPL-MCNC: 0.74 MG/DL (ref 0.6–1.1)
ERYTHROCYTE [DISTWIDTH] IN BLOOD BY AUTOMATED COUNT: 12.4 % (ref 11–14.5)
GFR SERPL CREATININE-BSD FRML MDRD: >60 ML/MIN/1.73M2
GLUCOSE BLD-MCNC: 111 MG/DL (ref 70–125)
HCT VFR BLD AUTO: 41.5 % (ref 35–47)
HGB BLD-MCNC: 13.8 G/DL (ref 12–16)
MCH RBC QN AUTO: 30.4 PG (ref 27–34)
MCHC RBC AUTO-ENTMCNC: 33.2 G/DL (ref 32–36)
MCV RBC AUTO: 91 FL (ref 80–100)
PLATELET # BLD AUTO: 221 THOU/UL (ref 140–440)
PMV BLD AUTO: 7.8 FL (ref 7–10)
POTASSIUM BLD-SCNC: 4.4 MMOL/L (ref 3.5–5)
PROT SERPL-MCNC: 6.5 G/DL (ref 6–8)
RBC # BLD AUTO: 4.54 MILL/UL (ref 3.8–5.4)
SODIUM SERPL-SCNC: 141 MMOL/L (ref 136–145)
TSH SERPL DL<=0.005 MIU/L-ACNC: 2.24 UIU/ML (ref 0.3–5)
VIT B12 SERPL-MCNC: 907 PG/ML (ref 213–816)
WBC: 4.9 THOU/UL (ref 4–11)

## 2020-04-09 ENCOUNTER — COMMUNICATION - HEALTHEAST (OUTPATIENT)
Dept: SCHEDULING | Facility: CLINIC | Age: 80
End: 2020-04-09

## 2020-04-10 ENCOUNTER — AMBULATORY - HEALTHEAST (OUTPATIENT)
Dept: NURSING | Facility: CLINIC | Age: 80
End: 2020-04-10

## 2020-04-23 ENCOUNTER — COMMUNICATION - HEALTHEAST (OUTPATIENT)
Dept: INTERNAL MEDICINE | Facility: CLINIC | Age: 80
End: 2020-04-23

## 2020-04-28 ENCOUNTER — OFFICE VISIT - HEALTHEAST (OUTPATIENT)
Dept: INTERNAL MEDICINE | Facility: CLINIC | Age: 80
End: 2020-04-28

## 2020-04-28 DIAGNOSIS — F51.01 PRIMARY INSOMNIA: ICD-10-CM

## 2020-04-28 DIAGNOSIS — R53.83 FATIGUE, UNSPECIFIED TYPE: ICD-10-CM

## 2020-04-28 DIAGNOSIS — G30.1 LATE ONSET ALZHEIMER'S DISEASE WITHOUT BEHAVIORAL DISTURBANCE (H): ICD-10-CM

## 2020-04-28 DIAGNOSIS — F02.80 LATE ONSET ALZHEIMER'S DISEASE WITHOUT BEHAVIORAL DISTURBANCE (H): ICD-10-CM

## 2020-04-28 DIAGNOSIS — M81.0 AGE-RELATED OSTEOPOROSIS WITHOUT CURRENT PATHOLOGICAL FRACTURE: ICD-10-CM

## 2020-04-28 ASSESSMENT — PATIENT HEALTH QUESTIONNAIRE - PHQ9: SUM OF ALL RESPONSES TO PHQ QUESTIONS 1-9: 1

## 2020-05-08 ENCOUNTER — COMMUNICATION - HEALTHEAST (OUTPATIENT)
Dept: INTERNAL MEDICINE | Facility: CLINIC | Age: 80
End: 2020-05-08

## 2020-05-22 ENCOUNTER — COMMUNICATION - HEALTHEAST (OUTPATIENT)
Dept: SCHEDULING | Facility: CLINIC | Age: 80
End: 2020-05-22

## 2020-05-27 ENCOUNTER — COMMUNICATION - HEALTHEAST (OUTPATIENT)
Dept: SCHEDULING | Facility: CLINIC | Age: 80
End: 2020-05-27

## 2020-05-27 DIAGNOSIS — R19.7 DIARRHEA, UNSPECIFIED TYPE: ICD-10-CM

## 2020-05-27 DIAGNOSIS — K52.831 COLLAGENOUS COLITIS: ICD-10-CM

## 2020-06-02 ENCOUNTER — OFFICE VISIT - HEALTHEAST (OUTPATIENT)
Dept: INTERNAL MEDICINE | Facility: CLINIC | Age: 80
End: 2020-06-02

## 2020-06-02 DIAGNOSIS — R53.83 FATIGUE, UNSPECIFIED TYPE: ICD-10-CM

## 2020-06-02 DIAGNOSIS — F02.80 LATE ONSET ALZHEIMER'S DISEASE WITHOUT BEHAVIORAL DISTURBANCE (H): ICD-10-CM

## 2020-06-02 DIAGNOSIS — G30.1 LATE ONSET ALZHEIMER'S DISEASE WITHOUT BEHAVIORAL DISTURBANCE (H): ICD-10-CM

## 2020-06-02 DIAGNOSIS — R19.7 DIARRHEA, UNSPECIFIED TYPE: ICD-10-CM

## 2020-06-02 DIAGNOSIS — Z87.2 HISTORY OF PSORIASIS: ICD-10-CM

## 2020-06-19 ENCOUNTER — OFFICE VISIT - HEALTHEAST (OUTPATIENT)
Dept: INTERNAL MEDICINE | Facility: CLINIC | Age: 80
End: 2020-06-19

## 2020-06-19 DIAGNOSIS — F41.9 ANXIETY: ICD-10-CM

## 2020-06-19 DIAGNOSIS — G30.1 LATE ONSET ALZHEIMER'S DISEASE WITHOUT BEHAVIORAL DISTURBANCE (H): ICD-10-CM

## 2020-06-19 DIAGNOSIS — F02.80 LATE ONSET ALZHEIMER'S DISEASE WITHOUT BEHAVIORAL DISTURBANCE (H): ICD-10-CM

## 2020-06-26 ENCOUNTER — COMMUNICATION - HEALTHEAST (OUTPATIENT)
Dept: INTERNAL MEDICINE | Facility: CLINIC | Age: 80
End: 2020-06-26

## 2020-06-29 ENCOUNTER — OFFICE VISIT - HEALTHEAST (OUTPATIENT)
Dept: INTERNAL MEDICINE | Facility: CLINIC | Age: 80
End: 2020-06-29

## 2020-06-29 DIAGNOSIS — H02.403 BLEPHAROPTOSIS, ACQUIRED, BILATERAL: ICD-10-CM

## 2020-06-29 DIAGNOSIS — F02.80 LATE ONSET ALZHEIMER'S DISEASE WITHOUT BEHAVIORAL DISTURBANCE (H): ICD-10-CM

## 2020-06-29 DIAGNOSIS — G30.1 LATE ONSET ALZHEIMER'S DISEASE WITHOUT BEHAVIORAL DISTURBANCE (H): ICD-10-CM

## 2020-06-29 DIAGNOSIS — Z01.818 PREOPERATIVE EXAMINATION: ICD-10-CM

## 2020-06-29 DIAGNOSIS — I77.72: ICD-10-CM

## 2020-06-29 LAB
ATRIAL RATE - MUSE: NORMAL
DIASTOLIC BLOOD PRESSURE - MUSE: NORMAL
INTERPRETATION ECG - MUSE: NORMAL
P AXIS - MUSE: NORMAL
PR INTERVAL - MUSE: NORMAL
QRS DURATION - MUSE: NORMAL
QT - MUSE: NORMAL
QTC - MUSE: NORMAL
R AXIS - MUSE: NORMAL
SYSTOLIC BLOOD PRESSURE - MUSE: NORMAL
T AXIS - MUSE: NORMAL
VENTRICULAR RATE- MUSE: NORMAL

## 2020-06-29 ASSESSMENT — MIFFLIN-ST. JEOR: SCORE: 898.6

## 2020-07-07 ENCOUNTER — COMMUNICATION - HEALTHEAST (OUTPATIENT)
Dept: SCHEDULING | Facility: CLINIC | Age: 80
End: 2020-07-07

## 2020-07-08 ENCOUNTER — COMMUNICATION - HEALTHEAST (OUTPATIENT)
Dept: INTERNAL MEDICINE | Facility: CLINIC | Age: 80
End: 2020-07-08

## 2020-07-08 DIAGNOSIS — F02.80 LATE ONSET ALZHEIMER'S DISEASE WITHOUT BEHAVIORAL DISTURBANCE (H): ICD-10-CM

## 2020-07-08 DIAGNOSIS — G30.1 LATE ONSET ALZHEIMER'S DISEASE WITHOUT BEHAVIORAL DISTURBANCE (H): ICD-10-CM

## 2020-07-10 ENCOUNTER — COMMUNICATION - HEALTHEAST (OUTPATIENT)
Dept: INTERNAL MEDICINE | Facility: CLINIC | Age: 80
End: 2020-07-10

## 2020-07-13 ENCOUNTER — COMMUNICATION - HEALTHEAST (OUTPATIENT)
Dept: SCHEDULING | Facility: CLINIC | Age: 80
End: 2020-07-13

## 2020-07-31 ENCOUNTER — COMMUNICATION - HEALTHEAST (OUTPATIENT)
Dept: INTERNAL MEDICINE | Facility: CLINIC | Age: 80
End: 2020-07-31

## 2020-08-05 ENCOUNTER — RECORDS - HEALTHEAST (OUTPATIENT)
Dept: ADMINISTRATIVE | Facility: OTHER | Age: 80
End: 2020-08-05

## 2020-08-26 ENCOUNTER — COMMUNICATION - HEALTHEAST (OUTPATIENT)
Dept: INTERNAL MEDICINE | Facility: CLINIC | Age: 80
End: 2020-08-26

## 2020-08-26 DIAGNOSIS — F51.01 PRIMARY INSOMNIA: ICD-10-CM

## 2020-09-10 ENCOUNTER — OFFICE VISIT - HEALTHEAST (OUTPATIENT)
Dept: INTERNAL MEDICINE | Facility: CLINIC | Age: 80
End: 2020-09-10

## 2020-09-10 ENCOUNTER — COMMUNICATION - HEALTHEAST (OUTPATIENT)
Dept: INTERNAL MEDICINE | Facility: CLINIC | Age: 80
End: 2020-09-10

## 2020-09-10 DIAGNOSIS — G30.1 LATE ONSET ALZHEIMER'S DISEASE WITHOUT BEHAVIORAL DISTURBANCE (H): ICD-10-CM

## 2020-09-10 DIAGNOSIS — F02.80 LATE ONSET ALZHEIMER'S DISEASE WITHOUT BEHAVIORAL DISTURBANCE (H): ICD-10-CM

## 2020-09-10 DIAGNOSIS — J31.0 CHRONIC RHINITIS: ICD-10-CM

## 2020-09-10 DIAGNOSIS — R19.7 DIARRHEA, UNSPECIFIED TYPE: ICD-10-CM

## 2020-09-10 DIAGNOSIS — K21.00 GASTROESOPHAGEAL REFLUX DISEASE WITH ESOPHAGITIS: ICD-10-CM

## 2020-09-10 DIAGNOSIS — F51.01 PRIMARY INSOMNIA: ICD-10-CM

## 2020-09-10 DIAGNOSIS — R53.83 FATIGUE, UNSPECIFIED TYPE: ICD-10-CM

## 2020-09-10 RX ORDER — ZALEPLON 5 MG/1
5 CAPSULE ORAL
Qty: 30 CAPSULE | Refills: 0 | Status: SHIPPED
Start: 2020-09-10 | End: 2022-08-17

## 2020-09-21 ENCOUNTER — RECORDS - HEALTHEAST (OUTPATIENT)
Dept: ADMINISTRATIVE | Facility: OTHER | Age: 80
End: 2020-09-21

## 2020-09-28 ENCOUNTER — RECORDS - HEALTHEAST (OUTPATIENT)
Dept: ADMINISTRATIVE | Facility: OTHER | Age: 80
End: 2020-09-28

## 2020-10-06 ENCOUNTER — COMMUNICATION - HEALTHEAST (OUTPATIENT)
Dept: INTERNAL MEDICINE | Facility: CLINIC | Age: 80
End: 2020-10-06

## 2020-10-06 DIAGNOSIS — J31.0 CHRONIC RHINITIS: ICD-10-CM

## 2020-10-06 DIAGNOSIS — K21.00 GASTROESOPHAGEAL REFLUX DISEASE WITH ESOPHAGITIS: ICD-10-CM

## 2020-10-22 ENCOUNTER — COMMUNICATION - HEALTHEAST (OUTPATIENT)
Dept: INTERNAL MEDICINE | Facility: CLINIC | Age: 80
End: 2020-10-22

## 2020-10-22 DIAGNOSIS — F51.01 PRIMARY INSOMNIA: ICD-10-CM

## 2020-10-23 ENCOUNTER — COMMUNICATION - HEALTHEAST (OUTPATIENT)
Dept: INTERNAL MEDICINE | Facility: CLINIC | Age: 80
End: 2020-10-23

## 2020-11-09 ENCOUNTER — RECORDS - HEALTHEAST (OUTPATIENT)
Dept: ADMINISTRATIVE | Facility: OTHER | Age: 80
End: 2020-11-09

## 2020-11-10 ENCOUNTER — RECORDS - HEALTHEAST (OUTPATIENT)
Dept: ADMINISTRATIVE | Facility: OTHER | Age: 80
End: 2020-11-10

## 2020-11-25 ENCOUNTER — RECORDS - HEALTHEAST (OUTPATIENT)
Dept: ADMINISTRATIVE | Facility: OTHER | Age: 80
End: 2020-11-25

## 2020-12-08 ENCOUNTER — COMMUNICATION - HEALTHEAST (OUTPATIENT)
Dept: INTERNAL MEDICINE | Facility: CLINIC | Age: 80
End: 2020-12-08

## 2020-12-08 DIAGNOSIS — J31.0 CHRONIC RHINITIS: ICD-10-CM

## 2020-12-18 ENCOUNTER — COMMUNICATION - HEALTHEAST (OUTPATIENT)
Dept: INTERNAL MEDICINE | Facility: CLINIC | Age: 80
End: 2020-12-18

## 2020-12-18 DIAGNOSIS — K21.00 GASTROESOPHAGEAL REFLUX DISEASE WITH ESOPHAGITIS: ICD-10-CM

## 2020-12-31 ENCOUNTER — COMMUNICATION - HEALTHEAST (OUTPATIENT)
Dept: INTERNAL MEDICINE | Facility: CLINIC | Age: 80
End: 2020-12-31

## 2020-12-31 DIAGNOSIS — F51.01 PRIMARY INSOMNIA: ICD-10-CM

## 2020-12-31 DIAGNOSIS — J31.0 CHRONIC RHINITIS: ICD-10-CM

## 2020-12-31 DIAGNOSIS — K21.00 GASTROESOPHAGEAL REFLUX DISEASE WITH ESOPHAGITIS: ICD-10-CM

## 2020-12-31 RX ORDER — SUCRALFATE 1 G/1
TABLET ORAL
Qty: 180 TABLET | Refills: 3 | Status: SHIPPED | OUTPATIENT
Start: 2020-12-31 | End: 2022-08-17

## 2020-12-31 RX ORDER — ESZOPICLONE 2 MG/1
2 TABLET, FILM COATED ORAL
Qty: 90 TABLET | Refills: 0 | Status: SHIPPED | OUTPATIENT
Start: 2020-12-31 | End: 2021-09-01

## 2021-02-19 ENCOUNTER — AMBULATORY - HEALTHEAST (OUTPATIENT)
Dept: NURSING | Facility: CLINIC | Age: 81
End: 2021-02-19

## 2021-02-23 ENCOUNTER — RECORDS - HEALTHEAST (OUTPATIENT)
Dept: ADMINISTRATIVE | Facility: OTHER | Age: 81
End: 2021-02-23

## 2021-03-12 ENCOUNTER — AMBULATORY - HEALTHEAST (OUTPATIENT)
Dept: NURSING | Facility: CLINIC | Age: 81
End: 2021-03-12

## 2021-03-21 ENCOUNTER — COMMUNICATION - HEALTHEAST (OUTPATIENT)
Dept: INTERNAL MEDICINE | Facility: CLINIC | Age: 81
End: 2021-03-21

## 2021-03-29 ENCOUNTER — OFFICE VISIT - HEALTHEAST (OUTPATIENT)
Dept: INTERNAL MEDICINE | Facility: CLINIC | Age: 81
End: 2021-03-29

## 2021-03-29 DIAGNOSIS — I47.19 ECTOPIC ATRIAL TACHYCARDIA (H): ICD-10-CM

## 2021-03-29 DIAGNOSIS — K21.00 GASTROESOPHAGEAL REFLUX DISEASE WITH ESOPHAGITIS WITHOUT HEMORRHAGE: ICD-10-CM

## 2021-03-29 DIAGNOSIS — R42 VERTIGO: ICD-10-CM

## 2021-03-29 DIAGNOSIS — G30.1 LATE ONSET ALZHEIMER'S DISEASE WITHOUT BEHAVIORAL DISTURBANCE (H): ICD-10-CM

## 2021-03-29 DIAGNOSIS — I77.72: ICD-10-CM

## 2021-03-29 DIAGNOSIS — F02.80 LATE ONSET ALZHEIMER'S DISEASE WITHOUT BEHAVIORAL DISTURBANCE (H): ICD-10-CM

## 2021-04-04 ENCOUNTER — COMMUNICATION - HEALTHEAST (OUTPATIENT)
Dept: INTERNAL MEDICINE | Facility: CLINIC | Age: 81
End: 2021-04-04

## 2021-04-05 ENCOUNTER — AMBULATORY - HEALTHEAST (OUTPATIENT)
Dept: INTERNAL MEDICINE | Facility: CLINIC | Age: 81
End: 2021-04-05

## 2021-04-05 DIAGNOSIS — I95.1 ORTHOSTATIC HYPOTENSION: ICD-10-CM

## 2021-04-12 ENCOUNTER — COMMUNICATION - HEALTHEAST (OUTPATIENT)
Dept: INTERNAL MEDICINE | Facility: CLINIC | Age: 81
End: 2021-04-12

## 2021-04-13 ENCOUNTER — COMMUNICATION - HEALTHEAST (OUTPATIENT)
Dept: INTERNAL MEDICINE | Facility: CLINIC | Age: 81
End: 2021-04-13

## 2021-04-13 ENCOUNTER — HOSPITAL ENCOUNTER (OUTPATIENT)
Dept: CARDIOLOGY | Facility: CLINIC | Age: 81
Discharge: HOME | End: 2021-04-13
Attending: INTERNAL MEDICINE

## 2021-04-13 ENCOUNTER — OFFICE VISIT - HEALTHEAST (OUTPATIENT)
Dept: INTERNAL MEDICINE | Facility: CLINIC | Age: 81
End: 2021-04-13

## 2021-04-13 DIAGNOSIS — K21.00 GASTROESOPHAGEAL REFLUX DISEASE WITH ESOPHAGITIS WITHOUT HEMORRHAGE: ICD-10-CM

## 2021-04-13 DIAGNOSIS — R42 VERTIGO: ICD-10-CM

## 2021-04-13 DIAGNOSIS — I47.19 ECTOPIC ATRIAL TACHYCARDIA (H): ICD-10-CM

## 2021-04-13 DIAGNOSIS — G30.1 LATE ONSET ALZHEIMER'S DISEASE WITHOUT BEHAVIORAL DISTURBANCE (H): ICD-10-CM

## 2021-04-13 DIAGNOSIS — F02.80 LATE ONSET ALZHEIMER'S DISEASE WITHOUT BEHAVIORAL DISTURBANCE (H): ICD-10-CM

## 2021-04-13 DIAGNOSIS — I95.1 ORTHOSTATIC HYPOTENSION: ICD-10-CM

## 2021-04-13 DIAGNOSIS — F51.01 PRIMARY INSOMNIA: ICD-10-CM

## 2021-04-13 RX ORDER — MIDODRINE HYDROCHLORIDE 2.5 MG/1
TABLET ORAL
Qty: 180 TABLET | Refills: 0 | Status: SHIPPED | OUTPATIENT
Start: 2021-04-13 | End: 2021-10-11

## 2021-04-21 ENCOUNTER — COMMUNICATION - HEALTHEAST (OUTPATIENT)
Dept: INTERNAL MEDICINE | Facility: CLINIC | Age: 81
End: 2021-04-21

## 2021-04-22 ENCOUNTER — COMMUNICATION - HEALTHEAST (OUTPATIENT)
Dept: INTERNAL MEDICINE | Facility: CLINIC | Age: 81
End: 2021-04-22

## 2021-04-26 ENCOUNTER — COMMUNICATION - HEALTHEAST (OUTPATIENT)
Dept: ADMINISTRATIVE | Facility: CLINIC | Age: 81
End: 2021-04-26

## 2021-04-26 ENCOUNTER — OFFICE VISIT - HEALTHEAST (OUTPATIENT)
Dept: INTERNAL MEDICINE | Facility: CLINIC | Age: 81
End: 2021-04-26

## 2021-04-26 ENCOUNTER — COMMUNICATION - HEALTHEAST (OUTPATIENT)
Dept: INTERNAL MEDICINE | Facility: CLINIC | Age: 81
End: 2021-04-26

## 2021-04-26 DIAGNOSIS — R42 VERTIGO: ICD-10-CM

## 2021-04-26 DIAGNOSIS — G30.1 LATE ONSET ALZHEIMER'S DISEASE WITHOUT BEHAVIORAL DISTURBANCE (H): ICD-10-CM

## 2021-04-26 DIAGNOSIS — F02.80 LATE ONSET ALZHEIMER'S DISEASE WITHOUT BEHAVIORAL DISTURBANCE (H): ICD-10-CM

## 2021-04-26 DIAGNOSIS — K21.00 GASTROESOPHAGEAL REFLUX DISEASE WITH ESOPHAGITIS WITHOUT HEMORRHAGE: ICD-10-CM

## 2021-04-26 DIAGNOSIS — I95.1 ORTHOSTATIC HYPOTENSION: ICD-10-CM

## 2021-04-26 DIAGNOSIS — H90.3 BILATERAL SENSORINEURAL HEARING LOSS: ICD-10-CM

## 2021-04-26 DIAGNOSIS — J31.0 CHRONIC RHINITIS: ICD-10-CM

## 2021-04-27 RX ORDER — FAMOTIDINE 20 MG/1
TABLET, FILM COATED ORAL
Qty: 90 TABLET | Refills: 3 | Status: SHIPPED | OUTPATIENT
Start: 2021-04-27 | End: 2022-08-17

## 2021-05-20 ENCOUNTER — OFFICE VISIT - HEALTHEAST (OUTPATIENT)
Dept: OTOLARYNGOLOGY | Facility: CLINIC | Age: 81
End: 2021-05-20

## 2021-05-20 ENCOUNTER — OFFICE VISIT - HEALTHEAST (OUTPATIENT)
Dept: AUDIOLOGY | Facility: CLINIC | Age: 81
End: 2021-05-20

## 2021-05-20 DIAGNOSIS — H90.3 SENSORINEURAL HEARING LOSS (SNHL) OF BOTH EARS: ICD-10-CM

## 2021-05-20 DIAGNOSIS — R42 DIZZINESS AND GIDDINESS: ICD-10-CM

## 2021-05-20 DIAGNOSIS — K21.9 LPRD (LARYNGOPHARYNGEAL REFLUX DISEASE): ICD-10-CM

## 2021-05-20 DIAGNOSIS — J31.0 CHRONIC RHINITIS: ICD-10-CM

## 2021-05-20 DIAGNOSIS — R42 DIZZINESS: ICD-10-CM

## 2021-05-20 RX ORDER — AZELASTINE 1 MG/ML
SPRAY, METERED NASAL
Qty: 30 ML | Refills: 12 | Status: SHIPPED | OUTPATIENT
Start: 2021-05-20 | End: 2022-08-17

## 2021-05-20 RX ORDER — PANTOPRAZOLE SODIUM 40 MG/1
40 TABLET, DELAYED RELEASE ORAL DAILY
Qty: 90 TABLET | Refills: 0 | Status: SHIPPED | OUTPATIENT
Start: 2021-05-20 | End: 2021-08-18

## 2021-05-24 ENCOUNTER — RECORDS - HEALTHEAST (OUTPATIENT)
Dept: ADMINISTRATIVE | Facility: CLINIC | Age: 81
End: 2021-05-24

## 2021-05-25 ENCOUNTER — RECORDS - HEALTHEAST (OUTPATIENT)
Dept: ADMINISTRATIVE | Facility: CLINIC | Age: 81
End: 2021-05-25

## 2021-05-26 ENCOUNTER — RECORDS - HEALTHEAST (OUTPATIENT)
Dept: ADMINISTRATIVE | Facility: CLINIC | Age: 81
End: 2021-05-26

## 2021-05-27 ENCOUNTER — RECORDS - HEALTHEAST (OUTPATIENT)
Dept: ADMINISTRATIVE | Facility: CLINIC | Age: 81
End: 2021-05-27

## 2021-05-27 ASSESSMENT — PATIENT HEALTH QUESTIONNAIRE - PHQ9: SUM OF ALL RESPONSES TO PHQ QUESTIONS 1-9: 1

## 2021-05-28 ENCOUNTER — RECORDS - HEALTHEAST (OUTPATIENT)
Dept: ADMINISTRATIVE | Facility: CLINIC | Age: 81
End: 2021-05-28

## 2021-05-28 NOTE — PROGRESS NOTES
[facility]   Outpatient Follow-up evaluation.     Current Outpatient Medications:      metoprolol succinate (TOPROL-XL) 25 MG, Take 1 tablet (25 mg total) by mouth daily., Disp: 90 tablet, Rfl: 8     OMEGA-3/DHA/EPA/FISH OIL (FISH OIL-OMEGA-3 FATTY ACIDS) 300-1,000 mg capsule, Take 2 g by mouth daily., Disp: , Rfl:      raloxifene (EVISTA) 60 mg tablet, TAKE 1 TABLET(60 MG) BY MOUTH DAILY, Disp: 90 tablet, Rfl: 0     VIT C/VIT E AC/LUT/COPPER/ZINC (PRESERVISION LUTEIN ORAL), Take by mouth., Disp: , Rfl:      zolpidem (AMBIEN) 5 MG tablet, TAKE 1 TABLET BY MOUTH EVERY NIGHT AT BEDTIME AS NEEDED., Disp: 10 tablet, Rfl: 0     calcium carbonate-vitamin D2 500 mg(1,250mg) -200 unit tablet, Take 1 tablet by mouth 2 (two) times a day., Disp: , Rfl:         Marley Andrade is a 78 y.o. Female    Chief Complaint   Patient presents with     Follow-up       Diagnoses:(See Problem list)            Recommendations:    Renew medications  Follow up as needed.      Subjective:   No further palpitations, no CP shortness of breath dizzy LH                    Past Medical History:   Diagnosis Date     Atrial flutter (H)      Hx antineoplastic chemotherapy      Past Surgical History:   Procedure Laterality Date     HYSTERECTOMY       OOPHORECTOMY       [place] TOTAL ABDOM HYSTERECTOMY      Description: Hysterectomy;  Recorded: 10/29/2008;     Allergies   Allergen Reactions     Hydrocodone-Acetaminophen Nausea And Vomiting     Morphine Nausea Only     Oxycodone Nausea And Vomiting     Topiramate      No family history on file.   Social History     Socioeconomic History     Marital status:      Spouse name: Not on file     Number of children: Not on file     Years of education: Not on file     Highest education level: Not on file   Occupational History     Not on file   Social Needs     Financial resource strain: Not on file     Food insecurity:     Worry: Not on file     Inability: Not on file     Transportation  "needs:     Medical: Not on file     Non-medical: Not on file   Tobacco Use     Smoking status: Former Smoker     Smokeless tobacco: Never Used   Substance and Sexual Activity     Alcohol use: Not on file     Drug use: Not on file     Sexual activity: Not on file   Lifestyle     Physical activity:     Days per week: Not on file     Minutes per session: Not on file     Stress: Not on file   Relationships     Social connections:     Talks on phone: Not on file     Gets together: Not on file     Attends Zoroastrian service: Not on file     Active member of club or organization: Not on file     Attends meetings of clubs or organizations: Not on file     Relationship status: Not on file     Intimate partner violence:     Fear of current or ex partner: Not on file     Emotionally abused: Not on file     Physically abused: Not on file     Forced sexual activity: Not on file   Other Topics Concern     Not on file   Social History Narrative     Not on file         Objective:   /60 (Patient Site: Left Arm, Patient Position: Sitting, Cuff Size: Adult Regular)   Pulse 88   Resp 16   Ht 5' 3.5\" (1.613 m)   Wt 102 lb 11.2 oz (46.6 kg)   BMI 17.91 kg/m    102 lb 11.2 oz (46.6 kg)   Wt Readings from Last 3 Encounters:   04/30/19 102 lb 11.2 oz (46.6 kg)   04/24/19 104 lb 4 oz (47.3 kg)   06/04/18 103 lb 8 oz (46.9 kg)     BP Readings from Last 3 Encounters:   04/30/19 100/60   04/24/19 106/60   06/04/18 118/68     Pulse Readings from Last 3 Encounters:   04/30/19 88   04/24/19 64   06/04/18 (!) 58     General appearance: alert, appears stated age and cooperative  Head: Normocephalic, without obvious abnormality, atraumatic  Eyes: Normal external exam without jaundice.  Ears: Normal external auricular exam.  Nose: Normal external exam.  Lungs: CLess than 10 mL of urine lear to auscultation bilaterally  Chest wall: no tenderness  Heart: regular rate and rhythm, S1, S2 normal, no murmur, click, rub or gallop   Pulses: 2+ and " symmetric  Skin: Skin color, texture, turgor normal.   Neurologic: Grossly normal, no focal neurologic findings.    Review of Systems:   General: WNL  Cardiographics: Reviewed in clinic.    Lab Results:  Lab Results: Personally reviewed  Physical on 04/24/2019   Component Date Value     VENTRICULAR RATE 04/24/2019 64      ATRIAL RATE 04/24/2019 64      P-R INTERVAL 04/24/2019 182      QRS DURATION 04/24/2019 86      Q-T INTERVAL 04/24/2019 392      QTC CALCULATION (BEZET) 04/24/2019 404      P Axis 04/24/2019 21      R AXIS 04/24/2019 -12      T AXIS 04/24/2019 42      MUSE DIAGNOSIS 04/24/2019                      Value:Normal sinus rhythm  Early transition  Low voltage QRS  Borderline ECG  When compared with ECG of 09-JUN-2017 19:48,  No significant change was found  Confirmed by PALLAVI MILLER, LES LOC:JN ([id number]) on 4/24/2019 4:39:37 PM       Sodium 04/24/2019 137      Potassium 04/24/2019 3.9      Chloride 04/24/2019 102      CO2 04/24/2019 24      Anion Gap, Calculation 04/24/2019 11      Glucose 04/24/2019 86      Calcium 04/24/2019 9.7      BUN 04/24/2019 17      Creatinine 04/24/2019 0.66      GFR MDRD Af Amer 04/24/2019 >60      GFR MDRD Non Af Amer 04/24/2019 >60      WBC 04/24/2019 5.6      RBC 04/24/2019 4.38      Hemoglobin 04/24/2019 13.5      Hematocrit 04/24/2019 40.0      MCV 04/24/2019 91      MCH 04/24/2019 30.8      MCHC 04/24/2019 33.7      RDW 04/24/2019 11.6      Platelets 04/24/2019 199      MPV 04/24/2019 7.6        Clinical evaluation time today including exam 30 minutes.  At least 50% of clinic evaluation time involved in assessment and patient counseling.  Part of this chart was created using a dictation software.  Typographic errors, word substitutions, and grammatical errors may unintentionally occur.    Aman Hdez M.D.  [facility]

## 2021-05-28 NOTE — PROGRESS NOTES
Preoperative Exam    Scheduled Procedure: (B) Lower Eye Lid Lift  Surgery Date:  5/2/19  Surgery Location: [facility] [phone]    Surgeon:  Dr Merida    Assessment/Plan:     1. Pre-operative examination for internal medicine  Stable for surgery  - Electrocardiogram Perform and Read    2. Ptosis of both eyelids  Unclear exact diagnosis but watering of eyes and poor capture of lower lids to undergo surgical repair.  Obtain ophthalmology note    3. Ectopic atrial tachycardia (H)  Continue low-dose metoprolol.  Update labs.  Asymptomatic  - Basic Metabolic Panel  - HM2(CBC w/o Differential)    4. Age-related osteoporosis without current pathological fracture  Status post 2 years of Prolia.  Update bone density and decide ongoing  - DXA Bone Density Scan; Future        Surgical Procedure Risk: Low (reported cardiac risk generally < 1%)  Have you had prior anesthesia?: Yes  Have you or any family members had a previous anesthesia reaction:  No  Do you or any family members have a history of a clotting or bleeding disorder?: No  Cardiac Risk Assessment: no increased risk for major cardiac complications    Patient approved for surgery with general or local anesthesia.    Please Note:  Exquisitely sensitive to many medications in general and narcotics and specific    Functional Status: Independent  Patient plans to recover at home with family.     Subjective:      Marley Andrade is a 78 y.o. female who presents for a preoperative consultation.  The exam is requested by Dr. Merida in preparation for bilateral lower eye lid lift to be performed at [facility] on 5/2/19. Today s examination on 4/24/2019 is done to review the underlying surgical condition of eye surgery, clear for anesthesia, and review medical problems with appropriate changes in medications.    Marley has tolerated previous surgeries well without bleeding or anesthesia difficulty. However she reacted poorly to opiate pain killers in the past  after her foot surgery which left her crying and vomiting.     Cardiac arrhythmias: She used to get dizziness, and lightheadedness, with arrhythmias for a long while. She still takes metoprolol, but no other medications for this. She still gets off balance, with minor lightheadedness/dizziness, and foggy brain, but all these symptoms are to a much lesser extent than in the past. EKG today is normal sinus rhythm.     Right Knee pain: She had some knee pain in February. This was horrific pain, and she ended up going in, and getting an injection into the joint, and went to PT afterward. The shot worked well for her and she has not had pain in the joint since.     Wrist pain: Her wrists are also very painful. Years ago she got pain in them and an injection helped. She plan to go back to ortho to a wrist antionette for injections into them again soon.     Osteoporosis: In the past she always got called for her prolia shots, and she has been on it forever. Her last DXA was two years ago. They stopped calling her though and now she thinks it has been a while since her last prolia shot.     Health Maintenance: Shingrix due. She also has not had her flu shot yet this year. Otherwise vaccines up to date.     ROS:   Denies diarrhea, dizziness, dyspnea, shortness of breath, knee pain, peripheral edema,   Admits to lightheadedness, dizziness, and foggy brain all to a lesser degree than they had been in the past.   All other systems reviewed and are negative, other than those listed in the HPI.    PMFS:  She has had foot surgery in the past and bad reaction to pain killers used.   She enjoys golfing and aerobics classes for exercise.   Reviewed as below    Pertinent History  Do you have difficulty breathing or chest pain after walking up a flight of stairs: No  History of obstructive sleep apnea: No  Steroid use in the last 6 months: No  Frequent Aspirin/NSAID use: No  Prior Blood Transfusion: No  Prior Blood Transfusion Reaction:  No  If for some reason prior to, during or after the procedure, if it is medically indicated, would you be willing to have a blood transfusion?:  There is no transfusion refusal.    Current Outpatient Medications   Medication Sig Dispense Refill     calcium carbonate-vitamin D2 500 mg(1,250mg) -200 unit tablet Take 1 tablet by mouth 2 (two) times a day.       metoprolol succinate (TOPROL-XL) 25 MG Take 1 tablet (25 mg total) by mouth daily. 90 tablet 0     OMEGA-3/DHA/EPA/FISH OIL (FISH OIL-OMEGA-3 FATTY ACIDS) 300-1,000 mg capsule Take 2 g by mouth daily.       raloxifene (EVISTA) 60 mg tablet TAKE 1 TABLET(60 MG) BY MOUTH DAILY 90 tablet 0     VIT C/VIT E AC/LUT/COPPER/ZINC (PRESERVISION LUTEIN ORAL) Take by mouth.       zolpidem (AMBIEN) 5 MG tablet TAKE 1 TABLET BY MOUTH EVERY NIGHT AT BEDTIME AS NEEDED. 10 tablet 0     No current facility-administered medications for this visit.         Allergies   Allergen Reactions     Hydrocodone-Acetaminophen Nausea And Vomiting     Morphine Nausea Only     Oxycodone Nausea And Vomiting     Topiramate        Patient Active Problem List   Diagnosis     Diverticulosis     Gastritis     Cholelithiasis     Palpitations     Collagenous Colitis     Localized Primary Osteoarthritis Of The Carpometacarpal Joint     Age-related osteoporosis without current pathological fracture     Diarrhea     Hemorrhoids     Seborrheic Dermatitis     Eczema     Osteoarthritis     Anxiety     Ectopic atrial tachycardia (H)       Past Medical History:   Diagnosis Date     Atrial flutter (H)      Hx antineoplastic chemotherapy        Past Surgical History:   Procedure Laterality Date     HYSTERECTOMY       OOPHORECTOMY       [place] TOTAL ABDOM HYSTERECTOMY      Description: Hysterectomy;  Recorded: 10/29/2008;       Social History     Socioeconomic History     Marital status:      Spouse name: Not on file     Number of children: Not on file     Years of education: Not on file     Highest education  "level: Not on file   Occupational History     Not on file   Social Needs     Financial resource strain: Not on file     Food insecurity:     Worry: Not on file     Inability: Not on file     Transportation needs:     Medical: Not on file     Non-medical: Not on file   Tobacco Use     Smoking status: Former Smoker     Smokeless tobacco: Never Used   Substance and Sexual Activity     Alcohol use: Not on file     Drug use: Not on file     Sexual activity: Not on file   Lifestyle     Physical activity:     Days per week: Not on file     Minutes per session: Not on file     Stress: Not on file   Relationships     Social connections:     Talks on phone: Not on file     Gets together: Not on file     Attends Uatsdin service: Not on file     Active member of club or organization: Not on file     Attends meetings of clubs or organizations: Not on file     Relationship status: Not on file     Intimate partner violence:     Fear of current or ex partner: Not on file     Emotionally abused: Not on file     Physically abused: Not on file     Forced sexual activity: Not on file   Other Topics Concern     Not on file   Social History Narrative     Not on file       Patient Care Team:  Aries Tovar MD as PCP - General          Objective:     Vitals:    04/24/19 1552   BP: 106/60   Pulse: 64   Resp: 16   SpO2: 97%   Weight: 104 lb 4 oz (47.3 kg)   Height: 5' 3.5\" (1.613 m)         Physical Exam:  Constitutional:  Reveals an alert pleasant elderly woman, affect appropriate, does not appear acutely ill. Vitals:  Per nursing notes.  Ears: External ear canals and TMs clear without erythema or excess cerumen  Oropharynx:  Without exudate, erythema, posterior nasal drainage or thrush.  Neck:  Supple, thyroid not palpable.  Cardiac:  Regular rate and rhythm without murmurs, rubs, or gallops. Legs without edema. Palpation of the radial pulse regular.  Lungs: Clear lung fields bilaterally, no wheezes crackles or rhonchi.  " Respiratory effort normal.  Neurologic: Cranial nerves II-XII, motor strength, sensation, and coordination grossly intact.     Psychiatric:  Mood appropriate, memory intact.     Patient Instructions   No change in meds    Morning of surgery take nothing    Update blood tests    EKG looks perfect    New shingles shot available at your pharmacy, which is more effective than the old one    Due for update bone density, it has been 2 years.  Then we can decide on the Prolia      EKG: Normal sinus rhythm, no conduction abnormalities or ischemic changes.     Labs:  Recent Results (from the past 24 hour(s))   Electrocardiogram Perform and Read    Collection Time: 04/24/19  3:59 PM   Result Value Ref Range    SYSTOLIC BLOOD PRESSURE  mmHg    DIASTOLIC BLOOD PRESSURE  mmHg    VENTRICULAR RATE 64 BPM    ATRIAL RATE 64 BPM    P-R INTERVAL 182 ms    QRS DURATION 86 ms    Q-T INTERVAL 392 ms    QTC CALCULATION (BEZET) 404 ms    P Axis 21 degrees    R AXIS -12 degrees    T AXIS 42 degrees    MUSE DIAGNOSIS       Normal sinus rhythm  Early transition  Low voltage QRS  Borderline ECG  When compared with ECG of 09-JUN-2017 19:48,  No significant change was found  Confirmed by PALLAVI MILLER, LES LOC: ([id number]) on 4/24/2019 4:39:37 PM     HM2(CBC w/o Differential)    Collection Time: 04/24/19  4:26 PM   Result Value Ref Range    WBC 5.6 4.0 - 11.0 thou/uL    RBC 4.38 3.80 - 5.40 mill/uL    Hemoglobin 13.5 12.0 - 16.0 g/dL    Hematocrit 40.0 35.0 - 47.0 %    MCV 91 80 - 100 fL    MCH 30.8 27.0 - 34.0 pg    MCHC 33.7 32.0 - 36.0 g/dL    RDW 11.6 11.0 - 14.5 %    Platelets 199 140 - 440 thou/uL    MPV 7.6 7.0 - 10.0 fL       Immunization History   Administered Date(s) Administered     DT (pediatric) 10/11/2005     Influenza, inj, historic,unspecified 11/20/2007, 10/29/2008, 10/19/2009     Influenza, seasonal,quad inj 6-35 mos 01/16/2014     Influenza,seasonal, Inj IIV3 11/20/2007, 10/29/2008, 12/13/2010, 09/26/2011, 01/16/2014     Pneumo  Conj 13-V (2010&after) 04/12/2017     Pneumo Polysac 23-V 10/11/2005     Td, Adult, Absorbed 10/11/2005     Td,adult,historic,unspecified 10/11/2005     Tdap 04/12/2017     ZOSTER, LIVE 09/04/2009       ADDITIONAL HISTORY SUMMARIZED (2): 2/22/19 urgency room note, and 3/7/19 [facility] note reviewed showing injection into knee tried for knee arthritis pain.   DECISION TO OBTAIN EXTRA INFORMATION (1): Care everywhere accessed  RADIOLOGY TESTS (1): 4/19/17 DXA reviewed showing osteoporosis. DXA ordered today.   LABS (1): 6/4/18 las reviewed and ordered labs today.   MEDICINE TESTS (1): EKG ordered and performed in office today.   INDEPENDENT REVIEW (2 each): EKG today showed normal sinus rhythm, no conduction abnormalities or ischemic changes.     The visit lasted a total of 17 minutes face to face with the patient. Over 50% of the time was spent counseling and educating the patient about Knee pain, wrist pain, cardiac arrhythmias, and surgery preparation and recovery.     I, Declan Maxwell, am scribing for and in the presence of, Dr. Tovar.    I, Dr. Tovar, personally performed the services described in this documentation, as scribed by Declan Maxwell in my presence, and it is both accurate and complete.    Total data points: 8      Electronically signed by Aries Tovar MD 04/24/19 3:54 PM

## 2021-05-28 NOTE — PATIENT INSTRUCTIONS - HE
No change in meds    Morning of surgery take nothing    Update blood tests    EKG looks perfect    New shingles shot available at your pharmacy, which is more effective than the old one    Due for update bone density, it has been 2 years.  Then we can decide on the Prolia

## 2021-05-29 ENCOUNTER — RECORDS - HEALTHEAST (OUTPATIENT)
Dept: ADMINISTRATIVE | Facility: CLINIC | Age: 81
End: 2021-05-29

## 2021-05-29 NOTE — TELEPHONE ENCOUNTER
I spoke with management today, and we are looking into availability of Evenity, I am not sure how long this will take, but realistically a few weeks.  Would you prefer to that we restart Prolia, her last injection was August 21, 2017, or weight potentially a few weeks until we find out if Evenity is available?

## 2021-05-29 NOTE — PROGRESS NOTES
MTM Initial Encounter  Assessment & Plan                                                     1. Age-related osteoporosis without current pathological fracture  Reviewed DXA scan as well as benefits vs risks of alternate treatment.  Based on chart review I anticipate that nonadherence to twice yearly Prolia is somewhat due to the patient's reluctance to scheduling an appointment when we call to do so.  Discussed that her continued bone loss is consistent with pharmacokinetics and expected efficacy and once yearly dosing of Prolia.  It would be reasonable to resume Prolia however an every six-month intervals.  Also discussed new anabolic agent on the market that is administered in office once monthly, this would allow for us to make sure that she stays on schedule, to help with adherence, however there is a slight concern for increased risk of cardiac events, she feels uncomfortable about this.  Discussed her current vitamin D level and supplement, recommend to decrease vitamin D to maintenance 2000 units daily.  We will review cost of Prolia versus Evenity, although will likely initiate Prolia every 6 months.  -Decrease vitamin D to 2000 units daily   -Review cost vs safety of prolia vs evenity   -When alternate is started, discontinue raloxifene     Follow Up  Return in about 1 week (around 6/5/2019) for Medication Management Pharmacist, By phone.      Subjective & Objective                                                     Marley Andrade is a 78 y.o. female called for an initial visit for Medication Therapy Management. She was referred to me from Aries Tovar MD.  This was a phone conversation with the patient to specifically discuss osteoporosis in addition to her other medications.    Chief Complaint: Medication Management     Medication Adherence/Access: Stable, however she does note that she has been having some memory issues and will be meeting with Dr. Rausch.  This is also evident in epic chart  reviewed that there are some concerns about memory loss.    Osteoporosis: Notes that she has been working with a holistic person to help with her supplements for the last 4 years.  She feels that this is greatly impacted her health.  She previously been on a vitamin D supplement and now just finished which she had at home.  From what she can tell me it sounds as though she may have been taking 18,000 units of vitamin D daily.  She has continued on Evista orally for osteoporosis.  Historically she had been on Actonel, raloxefine, hormone replacement, and Prolia.  Per chart review she received one Prolia injection in 2015, 2016, 2017.  Discussed with Margie, who stated that the patient generally likes to not schedule an appointment at the time of their phone conversations, but prefers to call back to schedule Prolia injections.  This may be how the injections were missed.    Vitamin D, Total (25-Hydroxy)   Date Value Ref Range Status   06/04/2018 55.7 30.0 - 80.0 ng/mL Final     PMH: reviewed in EPIC   Allergies/ADRs: reviewed in EPIC   Alcohol: discussed.   Tobacco:   Social History     Tobacco Use   Smoking Status Former Smoker   Smokeless Tobacco Never Used   ----------------    Much or all of the text in this note was generated through the use of Dragon Dictate voice-to-text software. Errors in spelling or words which seem out of context are unintentional. Sound alike errors, in particular, may have escaped editing.    The patient declined an after visit summary    I spent 60 minutes with this patient today.   All changes were made via collaborative practice agreement with Aries Tovar MD. A copy of the visit note was provided to the patient's provider.     Yared Amador, PharmD, BCACP  Medication Management (MTM) Pharmacist  [facility]     Current Outpatient Medications   Medication Sig Dispense Refill     calcium carbonate-vitamin D2 500 mg(1,250mg) -200 unit tablet  Take 1 tablet by mouth 2 (two) times a day.       cholecalciferol, vitamin D3, 2,000 unit Tab Take 1 tablet by mouth daily.       melatonin 5 mg Tab tablet Take 5 mg by mouth at bedtime as needed.       metoprolol succinate (TOPROL-XL) 25 MG Take 1 tablet (25 mg total) by mouth daily. 90 tablet 8     raloxifene (EVISTA) 60 mg tablet TAKE 1 TABLET(60 MG) BY MOUTH DAILY 90 tablet 0     VIT C/VIT E AC/LUT/COPPER/ZINC (PRESERVISION LUTEIN ORAL) Take 2 tablets by mouth daily.             No current facility-administered medications for this visit.

## 2021-05-29 NOTE — TELEPHONE ENCOUNTER
Received vm from pt today. Could you call her back and talk with her? I didn't completely understand her message and I see you have contacted her recently.    Thanks!

## 2021-05-29 NOTE — TELEPHONE ENCOUNTER
Who is calling:  Marley Andrade   Reason for Call:  Marley stated the last time she seen Dr. Tovar, he mentioned a doctor for memory loss. She would like to know what doctor he recommened and where she should go.   Date of last appointment with primary care: 4/24/19  Okay to leave a detailed message: Yes

## 2021-05-29 NOTE — TELEPHONE ENCOUNTER
Per Tawnya Amador, requested insurance verification for Prolia and Entity.  Received both today.  Both 100% covered.  Sending to Tawnya Amador as FYI.   Sent to scan.

## 2021-05-29 NOTE — TELEPHONE ENCOUNTER
Spoke with Marley, I will follow-up with her in about 2 weeks after I find out the availability of Evenity for administration in clinic. I did let her know this is covered 100%.

## 2021-05-29 NOTE — TELEPHONE ENCOUNTER
Discussed with Julien Madden and Les.  We would recommend initiating Evenity once monthly.  Left voicemail with and that I would like to discuss this further with her and I will call her tomorrow.  We still are not sure about the availability of this medicine therefore would like to call her in anticipation, and will likely follow-up with her in about 2 weeks once I get further direction on the availability.  If she declines this medicine, we will switch back to Prolia however will ensure that she is getting this every 6 months.  When 1 of these 2 medications is initiated we will discontinue raloxifene.

## 2021-05-30 ENCOUNTER — RECORDS - HEALTHEAST (OUTPATIENT)
Dept: ADMINISTRATIVE | Facility: CLINIC | Age: 81
End: 2021-05-30

## 2021-05-30 VITALS — BODY MASS INDEX: 17.24 KG/M2 | HEIGHT: 64 IN | WEIGHT: 101 LBS

## 2021-05-30 VITALS — BODY MASS INDEX: 17.29 KG/M2 | HEIGHT: 64 IN | WEIGHT: 101.3 LBS

## 2021-05-30 VITALS — HEIGHT: 64 IN | BODY MASS INDEX: 17.38 KG/M2 | WEIGHT: 101.8 LBS

## 2021-05-31 ENCOUNTER — RECORDS - HEALTHEAST (OUTPATIENT)
Dept: ADMINISTRATIVE | Facility: CLINIC | Age: 81
End: 2021-05-31

## 2021-05-31 VITALS — WEIGHT: 103.4 LBS | BODY MASS INDEX: 17.65 KG/M2 | HEIGHT: 64 IN

## 2021-05-31 NOTE — TELEPHONE ENCOUNTER
Is actually been talking with the patient over the last 2 months about this, we are hoping to be able to start her on the new anabolic agent, Evenity, however due to inability to get this medication in clinic at this time we will start with Prolia.  Once Evenity comes available we will likely switch her to that for 1 year time.  I sent a message to Margie to call and and schedule her Prolia injection as we have already identified that this is covered 100%.

## 2021-05-31 NOTE — PROGRESS NOTES
MTM Follow Up Encounter  Assessment & Plan                                                     1. Age-related osteoporosis without current pathological fracture  Currently untreated, however will resume treatment with Prolia.  Her appointment is on August 19, and reminded her of this.  When she is in clinic we will make sure we set an appointment for her next six-month Prolia injection, however ideally we will be able to switch her to Evenity once monthly.  Provided education on potential adverse effects of Prolia and how to mitigate these, including utilizing Tylenol as that she is now for arthritis type pain.  She demonstrates understanding and will initiate Prolia.  She will continue off of Evista.  Once Evenity becomes available we may discuss switching to that product.  - Initiate denosumab 60 mg (PROLIA 60 mg/ml)  -Educated to utilize Tylenol as needed for pain    Follow Up  Return in about 1 week (around 8/19/2019) for Prolia injection .      Subjective & Objective                                                       Marley Andrade is a 79 y.o. female called for a follow up visit for Medication Therapy Management. She was referred to me from Aries Tovar MD.     Chief Complaint: Medication management follow-up    Medication Adherence/Access: Stable, no specific issues identified.    Osteoporosis: After our last discussion, we were not able to acquire Evenity within our health system which was the preferred medication to treat her osteoporosis.  Because of that we want to start treating, we will resume Prolia.  She wanted to have a brief discussion about this to make sure that she understood the ongoing treatment plan.  She had previously been on Prolia however unfortunately was only given this once yearly likely due to some memory issues with remembering to schedule these appointments.  She is agreeable to resuming this medication, and wanted to discuss the side effects further.  In this time.  Since  our last discussion she had restarted Evista, however has been off of this now for 2 weeks, and does not plan on restarting as we previously discussed.      PMH: reviewed in EPIC   Allergies/ADRs: reviewed in EPIC   Alcohol: None  Tobacco:   Social History     Tobacco Use   Smoking Status Former Smoker   Smokeless Tobacco Never Used   ----------------    Much or all of the text in this note was generated through the use of Dragon Dictate voice-to-text software. Errors in spelling or words which seem out of context are unintentional. Sound alike errors, in particular, may have escaped editing.    The patient declined an after visit summary    I spent 15 minutes with this patient today;   All changes were made via collaborative practice agreement with Aries Tovar MD. A copy of the visit note was provided to the patient's provider.     Yared Amador, PharmD, BCACP  Medication Management (MTM) Pharmacist  [facility]    Current Outpatient Medications   Medication Sig Dispense Refill     acetaminophen (TYLENOL) 500 MG tablet Take 500 mg by mouth 3 (three) times a day as needed.       calcium carbonate-vitamin D2 500 mg(1,250mg) -200 unit tablet Take 1 tablet by mouth 2 (two) times a day.       cholecalciferol, vitamin D3, 2,000 unit Tab Take 1 tablet by mouth daily.       melatonin 5 mg Tab tablet Take 5 mg by mouth at bedtime as needed.       metoprolol succinate (TOPROL-XL) 25 MG Take 1 tablet (25 mg total) by mouth daily. 90 tablet 8     VIT C/VIT E AC/LUT/COPPER/ZINC (PRESERVISION LUTEIN ORAL) Take 2 tablets by mouth daily.             Current Facility-Administered Medications   Medication Dose Route Frequency Provider Last Rate Last Dose     denosumab 60 mg (PROLIA 60 mg/ml)  60 mg Subcutaneous Q6 Months Yared Amador, PharmD

## 2021-05-31 NOTE — TELEPHONE ENCOUNTER
Called and set up appt for 8/19/19. However, pt wants a call to discuss side effects.  Also, wants to know if she should continue to take her Relaxofin (sp?) ?  Would like Tawnya or Dr Tovar to call her.

## 2021-05-31 NOTE — TELEPHONE ENCOUNTER
Could you please call and see if she would like to meet with me in person to discuss this further.  I am not able to call her today and I am not back in town until Monday.  I believe I could see her on Monday if she would like to come in and discuss further.

## 2021-05-31 NOTE — PROGRESS NOTES
[facility] Outpatient Consultation    Assessment / Impression:   1.  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  Amnestic MCI by history  3.  History of tachycardia, status post ablation therapy  4.  Degenerative arthritis  5.  History of low blood pressure  6.  Osteoporosis  7.  Macular degeneration    Plan:   1.  MRI brain  2.  Neuropsychometric testing  3.  Review screening blood test on return to clinic    A long conversation held with the patient and  Judson in attendance.  This patient with above-noted past medical history has noted a 1 year history of gradually advancing difficulties with episodic memory.  In addition some increased anxiety related to spatial navigation have been noted by the patient and spouse.  Both patient and spouse believe that functional capacity is well-maintained despite these challenges.  I explained to the patient the nature of the diagnostic evaluation and my preliminary concern that she may be manifesting an early clinically identifiable stage of an Alzheimer process.  I did briefly discuss treatment options as well.    Total time for evaluation one hour with the majority time spent counseling.  All questions answered.    Subjective:   HPI: Marley Andrade is a 79 y.o. female with above-noted diagnoses who presents for cognitive assessment.  The patient is accompanied by her  of 26 years, Judson, who largely corroborates the story provided by the patient.  And it notes that she began identifying difficulty with episodic memory approximately 1 year ago.  Gradually advancing over time she is noted increasing difficulty recalling elements of conversation, names of acquaintances, etc.  In addition she did relate at least one story where she might of had difficulty with problem-solving relating to using a microwave.  Judson is in agreement with this report but also notes that his wife has experienced increasing levels of anxiety associated with  navigating to a destination while driving an automobile.  She apparently has never become lost while driving.  Both the patient and  believe that these difficulties have not been so severe as to impair and's ability to meet her obligations within the home.  Judson notes no relinquishing of IADL activity responsibility or deterioration in the quality of work performed within the home.  The  notes no changes in his wife's personality or behavior.  He notes no significant changes in language function.    On review of system questions the patient denies persistent orthostasis, tremors, loss of consciousness, visual illusions or hallucinations, difficulty with sleep, double vision or difficulty reading lines of text (patient does have some difficulty with reading believe secondary to macular degeneration).  She denies neck stiffness, falls or focal weakness.  No behavior suggestive of REM behavior disorder noted and no history of sleep disordered breathing.  No focal weakness or clumsiness.  No difficulty with purposeful limb movements.      Past Medical History:   As above    Social History:   Born in [place], [place], the patient completed high school education as well as a bachelor science degree in education at the [employer].  She has been  to her current  for 26 years.  She does have 2 children who live in the [facility].  Both the patient has been occupied a [facility] where they have lived I believe for 25 years.    Past Psychiatric History:   The patient has a history of claustrophobia but no other history of mental health disorders    Family History:   Negative for dementia    Review of Systems:  Pertinent items are noted in HPI.    Objective:   The patient is a well-developed thin elderly female who appears in no acute physical distress    There were no vitals filed for this visit.  Physical Exam:    Exam reveals a bit of venous stasis dermatitis over the lower  extremities.  A few senile ecchymoses are also scattered over the upper extremities.  Skin turgor is adequate.  HEENT exam reveals head to be normocephalic with conjunctiva clear.  Oropharynx is clear.  Neck is supple.  Lungs are clear to auscultation with normal work of breathing.  Cardiac exam with a distant regular S1-S2.  Abdomen flat nontender.  Extremities without significant edema.  Peripheral joint changes consistent with degenerative joint disease noted.  No redness swelling or tenderness.    Neurological Exam:     Cranial nerve exam reveals visual fields to be full.  Forward conjugate gaze noted.  Pursuit and saccadic movements are within normal limits although I do question a square wave jerk or 2.  Volitional EOMs are completely intact saccadic amplitude and velocity intact both horizontally and vertically.  Face is symmetrical.  Hearing is intact.  Speech is well articulated and projects normally.  Tongue is midline with no atrophy or fasciculation.  Palatal lift is symmetrical.  Peripherally, I do note what appears to be intact strength and tone.  Reflexes are a bit brisk in the lower extremities but symmetrical.  Frontal release signs are absent.  Patient station is quickly established with normal base and erect posture.  Romberg is negative.  Gait demonstrates good symmetric stride with good ground clearance bilaterally.  Associated arm movements are equal and symmetrical.  Turns are steady with no focal weakness or ataxia.  No evidence of gait apraxia or freezing of gait.    Cognitive Evaluation:     The patient was neatly groomed casually dressed and on time for her appointment.  She was alert pleasant socially appropriate and engaged throughout the course of evaluation.  Speech was generally fluent with no evidence of word finding hesitations or paraphasias.  No evidence of motor speech problems noted.  Attentional function appeared to be good with no fluctuation in level of consciousness or emma  distractibility.  Some mild evidence of difficulty with short-term recall identified but this was not specifically tested.  Patient's insight appears to be at least partially preserved.  Reasoning appears to be reasonably well intact.  Praxis appears to be good.  Affect is pleasant slightly anxious and mood euthymic.  No evidence of abnormal thought content or form.  No evidence of significant anxiety although the patient does report significant claustrophobia and thus the rationale for head CT is structural imaging of choice.  No evidence of an unusual ideations or unusual    Medications:  Scheduled Meds:  Continuous Infusions:  PRN Meds:.    Con Rausch MD  8/2/2019

## 2021-05-31 NOTE — TELEPHONE ENCOUNTER
Pt's insurance from May is still current. Cov 100%. Called pt and left pt to make appt.    If pt calls [facility], pt can be scheduled a couple of days out.    I will monitor for appt and call in one week if appt not made.

## 2021-05-31 NOTE — TELEPHONE ENCOUNTER
Because we are not able to get Evenity at this time, we will proceed with Prolia.  Please call patient and help her schedule appointment for Prolia injection.  I am sorry for the delay.

## 2021-05-31 NOTE — TELEPHONE ENCOUNTER
RN triage   Patient states that she has been waiting since May this year to hear if her insurance will be covering Prolia injections. She still has not heard.  She has not been taking her Evista while she has been waiting.  She would like to know if she should resume taking the Evista and just forego the Prolia. She states that she has a refill available at her pharmacy.  Please advise thank you.  Halie Warner RN  [facility] Triage Nurse  3:58 PM  8/5/2019

## 2021-05-31 NOTE — PROGRESS NOTES
The patient was seen for a neuropsychological evaluation for the purposes of diagnostic clarification and treatment planning. 108 minutes of face-to-face testing were provided by this writer. An additional 65 minutes were spent scoring and compiling test results.The patient was cooperative with testing. No concerns were brought to my attention. Please see Dr. Colvin's report for a detailed description of the charges and interpretation and integration of the findings.

## 2021-05-31 NOTE — PROGRESS NOTES
NEUROPSYCHOLOGICAL CONSULTATION    NAME:  Marley Andrade  :  1940    WILLIS: 2019    REASON FOR REFERRAL:  Ms. Anrdade is a 79 y.o., right-handed,  female with degenerative arthritis, osteoporosis, macular degeneration, hearing loss, colitis, and history of tachycardia (s/p ablation therapy) and hypotension. Due to concerns about progressive memory loss over the past year, the patient was evaluated by Con Rausch MD, in the [facility] on 2019. Based on the patient's history and his assessment findings, Dr. Rausch suspected a possible underlying Alzheimer's disease resulting in mild cognitive impairment.  She was referred for this neuropsychological evaluation by Dr. Rausch to assist with differential diagnosis and care planning.     Verbal consent for neuropsychological testing was received following the provision of information about the nature and purpose of the evaluation, and the opportunity to ask questions. Verbal permission to route a copy of the final report to her primary care provider was also obtained.     HISTORY OF PRESENTING PROBLEM:  The following information was obtained via medical record review and by interview of the patient.    Ms. Andrade reported experiencing memory issues that began insidiously about 1-2 years ago and have progressively worsened over time (particularly over the past three months). Specifically, she stated that she relies heavily on her calendar in order to remember appointment details and other plans. If something is not on her calendar, she will completely forget about it. She did acknowledge that she keeps a busy social schedule (e.g., she plays cards with three different groups of people every week), which makes it more difficult for her to keep track of her plans. The patient also reported that she got lost for 45 minutes while driving home from a clinic after receiving injections in her knees. She had never been to that clinic  before and forgot that she had a GPS with her. She also reported that she has difficulty learning how to use the computer. In particular, she described difficulty problem solving when something happens out of the ordinary when she is trying to use certain computer programs. She also cited an instance last year in which she could not remember how to use her son's microwave after he demonstrated how to use it three times that same day. Word-finding difficulties were also endorsed, particularly for familiar people's names. Despite these issues, the patient denied having any difficulty remembering conversations, her bridge hands, or rules of card games. She also denied any issues with attention/concentration, processing speed, or other language skills. Both her  and her son have reportedly noticed these changes in her cognition as well.     Per medical record review, the patient expressed concerns about her memory to her PCP (Aries Tovar MD) on 6/4/2018. At that time, she had noticed memory issues for the past 9 months. A brief cognitive evaluation was administered (MMSE) on which she scored 29/30. Dr. Tovar ordered blood work, which was unremarkable, and discussed either a referral to see Dr. Rausch and/or to start a medication. The patient eventually decided to see Dr. Rausch. As noted above, their initial appointment was on 8/2/2019. Based on the patient's history and his assessment findings, Dr. Rausch suspected a possible underlying Alzheimer's disease resulting in mild cognitive impairment. She was referred for neuroimaging and this neuropsychological evaluation by Dr. Rausch to assist with differential diagnosis and care planning. Results of a head CT dated 8/2/2019 revealed mild generalized atrophy and mild small vessel ischemic changes, which were suspected to all be age-related.    With regard to the activities of daily living, Ms. Andrade reported that she is fully independent. She currently resides  "in a condominium with her . She continues to drive without any issues, although she is increasingly fearful of getting lost and making mistakes. She independently manages her medications. She also pays the bills and manages the finances without any difficulty. She does not cook as often these days due to \"bad wrists,\" but is very mindful of her diet. She denied any issues with performing household chores or errands.    MEDICAL HISTORY:  Ms. Andrade's medical history is significant for degenerative arthritis, osteoporosis, macular degeneration, hearing loss (corrected with bilateral hearing aids), colitis (diagnosed in 2013), and history of tachycardia (s/p ablation therapy about 10 years ago), hysterectomy (2008), and hypotension. The patient denied any history of significant head injuries, known seizures, stroke, heart attack, tremor, balance issues, falls, or hallucinations.    Past Surgical History:   Procedure Laterality Date     HYSTERECTOMY       OOPHORECTOMY       SD TOTAL ABDOM HYSTERECTOMY      Description: Hysterectomy;  Recorded: 10/29/2008;     Diagnostic studies:  Brain MRI dated 4/19/2016 revealed scattered foci of nonenhancing T2 and FLAIR hyperintensity within the cerebral white matter, including a dominant 10 mm focus along the anterior right corona radiata and several patchy subcortical foci involving the anterior inferior right temporal lobe. These findings were thought to be nonspecific and could represent sequela of previous microvascular ischemic, inflammatory, or demyelinating insults.     More recently, head CT revealed mild age-related volume loss and small vessel ischemic changes.     Current medications include (per medical record):   Current Outpatient Medications:      acetaminophen (TYLENOL) 500 MG tablet, Take 500 mg by mouth 3 (three) times a day as needed., Disp: , Rfl:      calcium carbonate-vitamin D2 500 mg(1,250mg) -200 unit tablet, Take 1 tablet by mouth 2 (two) times a " "day., Disp: , Rfl:      cholecalciferol, vitamin D3, 2,000 unit Tab, Take 1 tablet by mouth daily., Disp: , Rfl:      melatonin 5 mg Tab tablet, Take 5 mg by mouth at bedtime as needed., Disp: , Rfl:      metoprolol succinate (TOPROL-XL) 25 MG, Take 1 tablet (25 mg total) by mouth daily., Disp: 90 tablet, Rfl: 8     VIT C/VIT E AC/LUT/COPPER/ZINC (PRESERVISION LUTEIN ORAL), Take 2 tablets by mouth daily.   , Disp: , Rfl:     Current Facility-Administered Medications:      denosumab 60 mg (PROLIA 60 mg/ml), 60 mg, Subcutaneous, Q6 Months, Yared Amador, PharmD, 60 mg at 08/19/19 1001.    RELEVANT FAMILY MEDICAL HISTORY:   Family medical history is significant for a brother with Parkinson's disease, a brother with anxiety and alcoholism, and pernicious anemia in her grandmother. Other family neurologic or psychiatric histories were denied.     PSYCHIATRIC HISTORY:  With regard to her psychiatric history, Ms. Andrade endorsed a history of anxiety. She described that she has always been a \"Type A\" personality, always needing to feel overly prepared and have everything planned out. Currently, the patient described her mood as \"anxious about my disease\" (referring to her cognitive impairment). She noted that she cries about her fear of having dementia and is afraid of making mistakes in general, particularly becoming lost while driving. She added, \"I'm a 10 otherwise! I just need reassurance about my memory.\"  Her sleep is normal and largely undisturbed. She estimates that she is typically getting about 9 hours of sleep per night and reported that she feels adequately energized during the day. She occasionally takes a 20 minute nap after playing bridge. Symptoms of KEENA or REM sleep behavior disorder were denied.    With regard to substance abuse, Ms. Andrade reported no history of past drug or alcohol abuse or dependence.  At the present time, she reported that she typically consumes 1 glass of wine on occasion.  She " is a former smoker.  There is no significant history of drug use or chemical dependency treatment.    SOCIAL HISTORY:  Ms. Andrade was born and raised in [place], [place]. Developmental issues were denied. She graduated high school and earned a Bachelor's of Science degree from the [employer], where she majored in Education. She earned mostly above average grades, but had to work hard for these grades. Significant learning difficulties or developmental attention issues were denied. 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 She has been  for 26 years, and they have 2 children together. The patient and her  live together in their own condo in [place], [place]. They have discussed moving into an [facility] in the future.    SERVICES:   A clinical interview/neurobehavioral status examination was conducted with the patient and documented. I thoroughly reviewed the medical record, selected the neuropsychological test battery, provided supervision to the trained examiner/technician, interpreted/integrated patient data and test results, and engaged in clinical decision making, treatment planning, and report writing/preparation. I directly administered/scored 2+ of the neuropsychological tests. A trained examiner/technician administered and scored the remainder of the neuropsychological tests (2+ tests).  Please see below for a breakdown of time spent and the associated codes billed for these services.   Services   Time Spent  CPT Codes   Neurobehavioral Status Exam:  (e.g., face-to-face, interpretation, report)   91 minutes 1 x 96116  1 x 96121   Neuropsychological Evaluation Services:   (e.g., integration, interpretation, treatment planning, clinical decision making, feedback)   222 minutes   1 x 96132  3 x 96133   Neuropsychological  Testing by Psychologist:  (e.g., test administration, scoring, 2+ tests administered)   17 minutes   1 x 96136     Neuropsychological Testing by Trained Examiner/Technician:  (e.g., test administration, scoring, 2+ tests administered)   173 minutes   1 x 96138  5 x 96139     For diagnostic and coding purposes, Ms. Andrade has degenerative arthritis, osteoporosis, macular degeneration, hearing loss, colitis, and history of tachycardia (s/p ablation therapy) and hypotension. She was referred for an evaluation of mild neurocognitive disorder.     TESTS ADMINISTERED:   Wechsler Memory Scale-III Information/Orientation, Wechsler Adult Intelligence Scale-IV (select subtests), Wide Range Achievement Test-4 (select subtests), Wechsler Memory Test-IV (select subtests), [place] Verbal Learning Test-Short Form, Trailmaking Test, Controlled Oral Word Association Test and Category Fluency, [place] Naming Test-Short Form, Clock Drawing Test, Mallory Oliveira Executive Functioning Test (Color Word Interference subtest), [place] Card Sorting Test-1 deck, Generalized Anxiety Disorder-7 Assessment and Geriatric Depression Scale-Short Form.    BEHAVIORAL OBSERVATIONS:   Ms. Andrade arrived on time and unaccompanied to today's appointment. She was appropriately dressed and groomed. She appeared alert and engaged. Gait and other motor activity appeared normal. No vision or hearing difficulties were observed. Conversational speech was of normal rate, volume, and prosody. Occasional word-finding pauses were noted. Her thought process appeared linear and goal-directed. No hallucinations or delusions were apparent. Judgment and insight appeared intact. Her mood was anxious and her affect was appropriately reactive. Rapport was easily established and eye contact was appropriate.     During testing, she was alert throughout. She was pleasant and cooperative throughout the evaluation, yet noticeably anxious and self-critical about her  performances. She understood test instructions without difficulty. She was observed to be mildly perseverative during testing. No frontal signs were observed behaviorally. Ms. Andrade appeared adequately motivated and engaged easily during testing. Her performances were fully intact on embedded and/or stand-alone measures of objective effort. Therefore, the following results are considered a valid estimation of her current cognitive abilities.    OPTIMAL PREMORBID INTELLECT:  Optimal premorbid intellectual abilities were estimated as falling in the average range based on Ms. Andrade's educational and occupational histories and performance on tasks least likely to be affected by acquired brain dysfunction.    SUMMARY OF TEST RESULTS:  ATTENTION/WORKING MEMORY. Performance on a measure sensitive to sustained auditory-verbal attention and concentration (WAIS-IV Digit Span) was in the high average range, as she was able to recite up to 5 digits forward (average), up to 5 digits backward (high average), and up to 6 digits in sequence (high average). On a test of complex concentration that requires speeded numeric sequencing (Trails A), the patient performed in the high average range and without error. On a more difficult version of that task that requires speeded alpha-numeric sequencing/cognitive set-shifting (Trails B), performance was in the average range and with two errors.     PROCESSING SPEED. Speeded digit-symbol coding was in the superior range (WAIS-IV Coding). On the DKEFS Color-Word Interference Test, speeded color naming was high average, as was speeded word reading.     LANGUAGE PROCESSING. Language comprehension appeared intact. The WAIS-IV Verbal Comprehension Index was in the high average range (pro-rated VCI = 110), with average performance on a subtest of word knowledge (Vocabulary), and high average performance on a measure of verbal abstract reasoning (Similarities). The patient demonstrated  average performance on the [place] Naming Test, a test of visual confrontation naming and semantic retrieval. Phonemic fluency was in the {average range (COWAT), as was semantic fluency. Her writing sample was intact and there was no evidence of micrographia.     VISUOSPATIAL/CONSTRUCTIONAL SKILLS. The WAIS-IV Perceptual Reasoning Index was in the average range (pro-rated GONZALO = 109), with high average nonverbal abstract reasoning (Matrix Reasoning), and average visuoconstruction with three-dimensional blocks (Block Design). On a Clock Drawing Task, the patient was able to draw a large, well-formed [place] with equally spaced and correctly placed numbers. Her clock hands converged nicely in the center of the clock face and were well-differentiated by length.     LEARNING/MEMORY. The patient was adequately oriented to personal and current information. On the WMS-IV Logical Memory subtest, immediate memory for paragraph-length stories was low average, while delayed memory was mildly impaired with a 25% retention rate. She needed to be prompted to recall the second story. Delayed recognition of that same material was borderline impaired. On a 9-item verbal list-learning task (CVLT-II Short Form), the patient acquired up to 5 words (56%) of the word list by the 4th and final learning trial (raw scores over trials = 3, 4, 5, 4). Total learning acquisition was in the mildly impaired range. Following a distractor task, the patient recalled 3 items, which was in the borderline impaired range. She also made 2 intrusion errors on that task. After a 10-minute delay, the patient recalled 0 items, which is impaired. Her recall benefited only slightly further from semantic cues (2 items; 3 intrusion errors; mildly impaired range). Recognition discriminability was in the low average range, as she recognized 8/9 items (average) but also made 7 false-positive errors (borderline impaired).     On a visual memory measure (WMS-IV Visual  Reproduction), immediate recall of simple geometric figures was in the average range, while delayed recall of the figures was in the mildly impaired range (with a 5% retention rate). Delayed recognition of the figures was in the low average range (she correctly identified 2 of the 7 figures).     EXECUTIVE FUNCTIONS. Concept identification and the ability to generate alternative problem solving strategies was within normal limits for age on the [place] Card Sorting Test. She completed 1 out of 3 categories (low average) with a total of 34 errors, which is low average. Twenty of her errors were perseverative (low average) and there were 0 failures to maintain set. On a test of inhibition and cognitive flexibility, (DKEFS Color-Word Interference Inhibition trial), the patient performed in the severely impaired range for completion time and made 3 errors, which is in the average range. On a test of complex concentration that requires speeded numeric sequencing (Trails A), the patient performed in the high average range and without error. On a more difficult version of that task that requires speeded alpha-numeric sequencing/cognitive set-shifting (Trails B), performance was in the average range and with two errors (1 sequencing and 1 set-loss). Verbal and nonverbal abstract reasoning were both high average (WAIS-IV Similarities and Matrix Reasoning). Phonemic fluency was average (COWAT). Performance on the Clock Drawing Test was intact, as she was able to accurately represent analog time.     MOOD. On a self-report measure of depression (Geriatric Depression Scale - Short Form), the patient endorsed 1 item. This suggests depressive symptoms in the normal range. On the Generalized Anxiety Disorder-7, a self-report measure of anxiety, she obtained a score of 2,  placing her in the range of minimal anxiety.    SUMMARY OF FINDINGS:  Ms. Andrade is a 79 y.o., right-handed,  female with degenerative arthritis,  osteoporosis, macular degeneration, hearing loss, colitis, and history of tachycardia (s/p ablation therapy) and hypotension. Due to concerns about progressive memory loss over the past year, the patient was evaluated by Con Rausch MD, in the [facility] on 8/2/2019. Based on the patient's history and his assessment findings, Dr. Rausch suspected a possible underlying Alzheimer's disease resulting in mild cognitive impairment.  She was referred for this neuropsychological evaluation by Dr. Rausch to assist with differential diagnosis and care planning.     Optimal premorbid intellectual abilities are estimated as falling in the average range, and several of Ms. Andrade's performances are generally commensurate with that estimate. However, she exhibits largely mild impairment across all measures of memory. More specifically, her learning of new information (particularly for verbal material) is impaired, and she loses a significant amount of information over time (her retention rates are between 0-25% across all measures). Her recall is very minimally aided by recognition prompts/cues, suggesting an encoding deficit on memory testing. All other cognitive domains are fully intact, including attention/concentration, processing speed, visuospatial/constructional ability, language processing, and executive functions. Of interest, while both semantic and phonemic fluencies are intact, phonemic fluency is in the upper limit of the average range while semantic fluency is in the lower limit of the average range (a difference of one standard deviation). Emotionally, the patient denies any significant symptoms of depression or anxiety on self-report inventories.    Overall, these results are suggestive of mild dysfunction in the bilateral mesial temporal structures. Given the current profile, her history, and intact activities of daily living, the patient currently meets criteria for single-domain amnestic Mild  Cognitive Impairment (i.e., Mild Neurocognitive Disorder), most likely due to an underlying Alzheimer's disease process. There is no support for any other type of neurodegenerative dementia, no evidence of medication side effects, and no psychiatric symptoms that could be explanatory at this time.    DIAGNOSTIC IMPRESSIONS:  Mild Neurocognitive Disorder, possibly due to Alzheimer's Disease    RECOMMENDATIONS:  1) Ongoing neurologic care and monitoring is recommended. If not medically contraindicated, Ms. Andrade may benefit from a trial of a cognitive enhancing medication (e.g., donepezil).     2) It will be increasingly important for Ms. Andrade and her family/loved ones to have a strong support network in place. The Alzheimer s Association (http://www.alz.org or 1-605.490.6390) has information about local support groups as well as a breadth of informational materials.     3) If not already done, completion of paperwork for advance directives and assignment of healthcare and financial power of  should be considered at this time.    4) Additional oversight while performing complex tasks is recommended (e.g., medication and financial management) in order to ensure accuracy. Her current living situation remains appropriate at this time. From a cognitive perspective, I have no glaring concerns about her ability to safely drive in familiar areas; however, if she has to travel outside of her neighborhood/familiar area, she might benefit from a GPS or having someone accompany her.    5) Ms. Andrade is encouraged to remain physically, socially, and mentally active.     6) Neuropsychological follow-up is recommended in 12-18 months (or as clinically indicated) in order to monitor the patient's cognitive status and update recommendations. The current test data can be used as a baseline to which future comparisons can be made.      Ms. Andrade has requested to receive the results of this evaluation from her  referring provider at the time of an upcoming follow-up appointment; however, she was encouraged to schedule a formal feedback appointment with me after that appointment to discuss these results in further detail. Thank you for allowing me to participate in Ms. Andrade's care. Please contact me with any questions regarding the content of this report.        Gisselle Colvin PsyD, LP  Licensed Clinical Neuropsychologist  [facility]  Neuropsychology Section   Phone:  [phone]

## 2021-06-01 ENCOUNTER — RECORDS - HEALTHEAST (OUTPATIENT)
Dept: ADMINISTRATIVE | Facility: CLINIC | Age: 81
End: 2021-06-01

## 2021-06-01 VITALS — BODY MASS INDEX: 18.05 KG/M2 | WEIGHT: 103.5 LBS

## 2021-06-01 NOTE — TELEPHONE ENCOUNTER
"Week ago tripped and landed on right shoulder.  Went to urgent care, xrays were done and pain meds.    Still painful and still uncomfortable.    Lightheaded and dizzy.  Has heart issues.    Taking tylenol and ibuprofen, alternating.    Does not have to wear harness any more.  Pain has improved over the past week.    Lightheadedness is driving her crazy.  It is continuous.  Rest does not help it.    Able to walk.  Resting a lot.  Aware of the lightheadedness.  Sleeps well at night.  Bp readings have been normal, patient states.    She is a \"water drinker\", and has had 3 cups so far this morning.  Carries water every where she goes.  Is always thirsty.    No ear pressure.    Will try to increase her water intake and see if she feels better today.  IF not, she will call back for appointment with pcp.  IF worse then needs to be seen today.    \"foggy brain\".    [facility]  gets home shortly. ( I think she means that she is going to [facility] room). She likes how she got in fast last time and its near her home.    Romina Hung, RN, [facility] Nurse Triage/Med Refills RN                   "

## 2021-06-01 NOTE — TELEPHONE ENCOUNTER
Patient Returning Call  Reason for call:  Patient called back.  Information relayed to patient:  Informed of Dr Tovar's message.  Patient states understanding and has an appointment this Friday.  Patient has additional questions:  No  If YES, what are your questions/concerns:    Okay to leave a detailed message?: No call back needed

## 2021-06-01 NOTE — TELEPHONE ENCOUNTER
Please refer for inner ear exercises and vestibular rehab unless she has had that done already and it was ineffective

## 2021-06-02 VITALS — HEIGHT: 64 IN | BODY MASS INDEX: 17.8 KG/M2 | WEIGHT: 104.25 LBS

## 2021-06-02 VITALS — BODY MASS INDEX: 17.53 KG/M2 | WEIGHT: 102.7 LBS | HEIGHT: 64 IN

## 2021-06-02 NOTE — PROGRESS NOTES
Assessment / Impression     1.  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  Other medical problems as noted    Plan:   1.  Round out screening blood test  2.  Begin donepezil 5 mg p.o. daily at lunch per patient request  3.  Follow-up with OT evaluation in 1 month  4.  Discuss driving on return to clinic although some discussion was held today    Long conversation held with the patient and  Judson in attendance.  CT of the brain is nonspecific.  Screening blood test within normal limits although B12 and TSH are required.  Neuropsychometric testing reveals significant difficulties with new learning not aided by cues.  Constellation of the patient's age, history of present illness, and evaluation thus far is quite consistent with an Alzheimer process at an MCI level of impairment.  I spent 1 hour discussing with the patient her diagnosis and treatment options.  All questions answered.    Follow-up in 1 month    Total time for evaluation one hour with majority time spent counseling with respect to the above-noted matters.      Subjective:     HPI: Marley Andrade is a 79 y.o. female with above-noted diagnoses who is seen in follow-up.  Diagnostic evaluation as noted above.  No new complaints noted          Review of Systems:          As above        Objective:   There were no vitals filed for this visit.    No results found for this or any previous visit (from the past 24 hour(s)).    Physical Exam: Patient is neatly groomed casually dressed and seated for evaluation.  She is alert pleasant and directable at this time.  No changes noted physically cognitively or emotionally.

## 2021-06-02 NOTE — PROGRESS NOTES
[facility] Certification Request    October 11, 2019      Patient: Marley Andrade  MR Number: [id number]  YOB: 1940  Date of Visit: 10/11/2019      Dear Dr. rAies Tovar:    Thank you for this referral.   We are seeing Marley Andrade in Occupational Therapy for vertigo.    Medicare and/or Medicaid requires physician review and approval of the treatment plan. Please review the plan of care and verify that you agree with the therapy plan of care by co-signing this note.      Plan of Care  Authorization / Certification Start Date: 10/11/19  Authorization / Certification End Date: 01/09/20  Authorization / Certification Number of Visits: 12  Communication with: Referral Source  Patient Related Instruction: Nature of Condition;Treatment plan and rationale;Basis of treatment;Expected outcome  Times per Week: 1  Number of Weeks: 12  Number of Visits: 12  Neuromuscular Reeducation: vestibular  Canolith Repostioning:        Goals:  Patient will show improved balance for safer: for household ambulation;in 12 weeks  Patient will be able to walk: on uneven/inclined surfaces;without loss of balance;in 12 weeks  Patient able to perform bed mobility: without dizziness;in 12 weeks  Patient will turn head: without dizziness;for conversation;in 12 weeks        If you have any questions or concerns, please don't hesitate to call.    Sincerely,      Justa Szymanski, OT        Physician recommendation:                                ___ Follow therapist's recommendation                                                                                                    ___ Modify therapy                                                                                                      Physician Signature:_____________________                                                                                                                                         "Date:___________________________    *Physician co-signature indicates they certify the need for these services furnished within this plan and while under their care.      [facility]   Vestibular Initial Evaluation    Patient Name: Marley Andrade  Date of evaluation: 10/11/2019  Referral Diagnosis: vertigo  Referring provider: Aries Tovar MD  Visit Diagnosis:     ICD-10-CM    1. Benign paroxysmal positional vertigo of right ear H81.11    2. Unsteadiness on feet R26.81    3. Decreased activities of daily living (ADL) Z78.9        Assessment:      Patient has positive right Sinclair - Hallpike and was treated with right Epley maneuver today.    Goals:  Patient will show improved balance for safer: for household ambulation;in 12 weeks  Patient will be able to walk: on uneven/inclined surfaces;without loss of balance;in 12 weeks  Patient able to perform bed mobility: without dizziness;in 12 weeks  Patient will turn head: without dizziness;for conversation;in 12 weeks    Patient's expectations/goals are realistic.    Barriers to Learning or Achieving Goals:  No Barriers.       Plan / Patient Instructions:        Plan of Care:   Authorization / Certification Start Date: 10/11/19  Authorization / Certification End Date: 01/09/20  Authorization / Certification Number of Visits: 12  Communication with: Referral Source  Patient Related Instruction: Nature of Condition;Treatment plan and rationale;Basis of treatment;Expected outcome  Times per Week: 1  Number of Weeks: 12  Number of Visits: 12  Neuromuscular Reeducation: vestibular  Canolith Repostioning:           Subjective:         History of Present Illness:    Marley is a 79 y.o. female who presents to therapy today with complaints of dizziness, unsteadiness and \"head fog\". Patient had gradual onset of symptoms about 3 years ago and reports increased intensity since she fell in September 2019. Symptoms are getting worse. Patient denies ear pain. Patient denies " recent hearing changes. Functional limitations are described as occurring with balance, bending, bed mobility, head turns, looking up or down, stepping over curbs.    Pain Ratin         Objective:      Note: Items left blank indicates the item was not performed or not indicated at the time of the evaluation.    Patient Outcome Measures:   Dizziness Handicap Inventory  Score in %: 12       Vestibular Disorder Examination  1. Benign paroxysmal positional vertigo of right ear     2. Unsteadiness on feet     3. Decreased activities of daily living (ADL)         Precautions/Restrictions: None    Posture Observation: General standing posture is normal.    ROM:  Not Tested    Strength: Not Tested    Sensation: patient reports normal sensation    Functional Mobility: good      Modified CTSIB: NT  Normal Surface Eyes Open-  Normal Surface Eyes Closed-  Perturbed Surface Eyes Open-  Perturbed Surface Eyes Closed-    The remainder of the tests are performed in room light.    Oculomotor Assessment:   Spontaneous Nystagmus with fixation:   Spontaneous Nystagmus without fixation:  Gaze-evoked nystagmus:  Smooth pursuit:  Saccades:  VOR to slow movement:   VOR Head Thrust:  VOR Cancellation:  OPK's: NT  Static Visual Acuity:  Dynamic Visual Acuity:    Positional Tests:  Hallpike Right:  Abnormal - Nystagmus right torsional, up beating, 5 second latency and fatigues in 20 seconds  Hallpike Left:  patient has no nystagmus but vertigo with sitting up  Head Roll Right: NT  Head Roll Left:  NT    Plan for next visit: balance exercises    Treatment Today:  Patient treated with left Epley maneuver X1 and right Epley maneuver X2.  Patient education regarding BPPV and self management of symptoms.  TREATMENT MINUTES COMMENTS   Evaluation 15    Self-care/ Home management 10    Neuromuscular Re-education     Canalith repositioning procedure 15          Total 40    Blank areas are intentional and mean the treatment did not include these  items.     GOALS AND PLAN OF CARE WERE ESTABLISHED IN COOPERATION WITH THE PATIENT    OT Evaluation Code: (Please list factors)   Comorbidities:   Patient Active Problem List   Diagnosis     Diverticulosis     Gastritis     Cholelithiasis     Palpitations     Collagenous Colitis     Localized Primary Osteoarthritis Of The Carpometacarpal Joint     Age-related osteoporosis without current pathological fracture     Diarrhea     Hemorrhoids     Seborrheic Dermatitis     Eczema     Osteoarthritis     Anxiety     Ectopic atrial tachycardia (H)        Profile/History Review: Brief    Need for eval modification: No    # Treatment options: Limited    Clinical Decision Making:  Low      Occupational Profile/ Medical and Therapy History and Comorbidities Occupational Performance Clinical Decision Making   (Complexity)   brief history with review of medical/therapy records related to the presenting problem.  No comorbidities 1-3 Performance deficits that result in activity limitations and/or participation restrictions.    No Assessment Modification  Low complexity, which includes  problem-focused assessments, and consideration of a limited number of treatment options.      expanded review of medical/therapy records and additional review of physical, cognitive and psychosocial history.    May have comorbidities 3-5 Performance deficits that result in activity limitations and/or participation restrictions.    Minimal to moderate modification of assessment Moderate complexity, which includes analysis of data from detailed assessments, and consideration of several treatment options.         Review of medical/therapy records and extensive additional review of physical, cognitive and psychosocial history.  Comorbidities affect occupational performance 5 or more Performance deficits that result in activity limitations and/or participation restrictions.    Significant modification of assessment High complexity, analysis of   Occupational profile and data,  Comprehensive assessments, multiple treatment options.            Justa Szymanski  10/11/2019  11:40 AM

## 2021-06-02 NOTE — PROGRESS NOTES
Discharge Summary  Patient Name: Marley Andrade  Date: 1/11/2020  Referral Diagnosis: vertigo  Referring provider: Aries Tovar MD  Visit Diagnosis:   1. Benign paroxysmal positional vertigo of right ear     2. Unsteadiness on feet     3. Decreased activities of daily living (ADL)       Goal Status: goals met  Patient will show improved balance for safer: for household ambulation;in 12 weeks  Patient will be able to walk: on uneven/inclined surfaces;without loss of balance;in 12 weeks  Patient able to perform bed mobility: without dizziness;in 12 weeks  Patient will turn head: without dizziness;for conversation;in 12 weeks      Patient was seen for 2 visits between 10-11-19 and 10-31-19.    Therapy will be discontinued at this time.  The patient will need a new referral to resume.    Thank you for your referral.  Justa Szymanski  1/11/2020   8:26 AM    [facility] Daily Progress     Patient Name: Marley Andrade  Date: 10/31/2019  Visit #: 2  Referral Diagnosis: vertigo  Referring provider: Aries Tovar MD  Visit Diagnosis:     ICD-10-CM    1. Benign paroxysmal positional vertigo of right ear H81.11    2. Unsteadiness on feet R26.81    3. Decreased activities of daily living (ADL) Z78.9          Assessment:     Patient is appropriate to continue with skilled occupational therapy intervention, as indicated by initial plan of care. Patient reports resolution of vertigo for 2 days after last treatment and then she fell and has had mild dizziness since then.    Goal Status:  Patient will show improved balance for safer: for household ambulation;in 12 weeks  Patient will be able to walk: on uneven/inclined surfaces;without loss of balance;in 12 weeks  Patient able to perform bed mobility: without dizziness;in 12 weeks  Patient will turn head: without dizziness;for conversation;in 12 weeks      Plan / Patient Education:     Continue with initial plan of care. Progress with home program as  tolerated.    Subjective:     Pain Ratin    Subjective progress relative to last visit:  Intensity of dizziness is:  less  Frequency of dizziness is: less  Duration of dizziness is: less  Balance is:  better    Objective:     Positional Tests:  Hallpike Right:  Negative  Hallpike Left:  Negative  Head Roll Right: NT  Head Roll Left:  NT    Plan for next visit: progress HEP and check positional tests as needed    Treatment Today: Patient has negative Johana-Hallpike's today. Instructed on adaptation and substitution exercises today as patient reports dizziness and imbalance.  X1 viewing-10 seconds-instructed  Normal surface eyes closed-instructed  TREATMENT MINUTES COMMENTS   Evaluation     Self-care/ Home management     Neuromuscular Re-education 23    Canalith repositioning procedure 3          Total 26    Blank areas are intentional and mean the treatment did not include these items.          Justa Szymanski  10/31/2019  11:38 AM

## 2021-06-02 NOTE — TELEPHONE ENCOUNTER
Marley called stating that she would like to discontinue the Donepezil and possibly change to a different medication.  When she started taking it she had horrible nausea that would last for almost 6 hours and after that was completely wiped out from it.  She would like a call back to discuss alternatives.

## 2021-06-02 NOTE — TELEPHONE ENCOUNTER
10/14/2019 note:    I did contact the patient who appears not to be taking the Aricept as prescribed.  I reviewed with the patient my advice that she take the medicine with her lunch and at the lunch be sizable and contain some fat.  We did talk about utilizing scrambled eggs, eggs salad, 2% milk, etc. The patient will give me a call back in a week to let me know how she is progressing.

## 2021-06-04 VITALS
SYSTOLIC BLOOD PRESSURE: 102 MMHG | WEIGHT: 100 LBS | BODY MASS INDEX: 17.07 KG/M2 | HEIGHT: 64 IN | OXYGEN SATURATION: 98 % | DIASTOLIC BLOOD PRESSURE: 62 MMHG | HEART RATE: 74 BPM

## 2021-06-04 NOTE — PROGRESS NOTES
"Occupational Therapy Evaluation      Medicare Insurance  Subscriber ID: [id number]         Units: 1 OT evaluation  Total Minutes: 60 minutes    Medical Diagnosis: Alzheimer type  Treatment Diagnosis: Cognitive Impairment  Referring Practitioner: Con Rausch MD  Date of referral: 12/6/29  Start of care: 12/6/2019    Patient was referred by Dr. Rausch for an occupational therapy outpatient cognitive evaluation. The assessments used in this evaluation will help determine if cognitive impairment is present and if so, how functional daily activity may be affected.  Following the assessments, the occupational therapist may offer education and recommendations to assist caregivers in providing the optimal level of support for their loved one. Patient was seen on 12/6/2019 and was accompanied by her, who waited in the waiting area.  Patient appeared happy, cooperative and eager to participate.  Patient  ambulated independently without a device. Pt does note having \"foggy brain\", did do therpay for vestibular issues over last months.    When asked if they knew the purpose of this appointment, She replied with \"to check my thinking\".    Living Arrangement:  Lives with . Lives in a Condo. Per pt,  recently diaginosis w/ Parkinson's. Considering a new living place down the road- [facility].   Homemaking:  Joined effort, does have help x1 month for cleaning.   Financial Management: both,  does investing, moving to auto bill paying  Medications: self  Driving:  driving  ADL:  indep  Leisure: golf, swimming, walking, activity     OP Adult Fall Risk Assessment:  1. Have you fallen 2 or more times in the past year?   NO  2. Have you fallen and had an injury in the past year?    NO    Pain: 0/10, does have arthiristis pain in wrist/thumb area.     OBJECTIVE  Results of assessments are as follows:    CPT:   Sub-test Score   Med box 5/6   Shop 5/6   Wash 5/5   [place] 4/5   Phone 6/6       Average Score " 5.0/5.6       MOCA: 20/30  MOCA memory index score: 6/15    The above assessment scores indicate a mild level of impairment.      ASSESSMENT  Recommendations:  Cognitive functioning recommendations: 5.0-5.2: The assessment scores indicate Marley is able to live independently with weekly check-in supervision. she may have difficulty with complex information and problem solving such as with managing finances and medications. Supervision with managing finances and medications is recommended to ensure accuracy and safety. Marley may also have difficulty with driving due to complex problem solving and mental flexibility required for safe driving. A formal driving evaluation may be appropriate if she is interested in continuing driving. Familiar tasks are completed independently such as dressing, grooming and bathing. Learning new information may be difficult for Marley, be she is able to do so with repetition. Memory aids such as calendars, to-do lists, etc. are typically effective. The use of hazardous tools or activities should be monitored closely.    PLAN  Goal: Patient and/or family will verbalize understanding of recommendations regarding instrumental activities of daily living and activities of daily living.    Plan of Care: Communications with treatment Team and Patient/ Family instruction: Treatment plan/rationale     Thank you for this referral.  If I can be of further assistance, please do not hesitate to contact me.    Insurance: Medicare, [facility] #  [id number]   Provider # ;   Certification Dates: from 12/6/19 to 2/6/19     Physician Recommendation:  1. I certify the need for these services furnished within this plan and while under my care. I agree with the therapist's recommendation for plan of care.  2. If there is any recommendation for modification of therapy plan, please indicate below.    Rubens Trinh, OTRL/NOEMY 12/6/19

## 2021-06-04 NOTE — PROGRESS NOTES
Assessment / Impression   1.  Dementia the Alzheimer type at a mild cognitive level of impairment  2.  Recent diarrhea possibly in part secondary or at least exacerbated by cholinesterase inhibitor therapy  3.  Other medical problems as noted      Plan:   1.  Continue donepezil at 5 mg/day with consideration of upward titration in time if needed  2.  Wellness programming  3.  Consider cognitive OT testing in the future    A very long conversation held with the patient and  in attendance.  The patient did develop one day of diarrhea following a day of heavy vegetable intake.  This is since resolved.  Given some concern regarding the contribution of cholinesterase inhibitor may have played in the patient's GI symptoms I have advised that we remain at a 5 mg dose for the time being.  Certainly in time, depending on clinical circumstances, upward titration of cholinesterase inhibitor therapy can be considered.  At present I believe the patient should follow with her primary physician with referral as necessary.  All questions answered.    Total time for evaluation 45 minutes with majority time spent counseling.      Subjective:     HPI: Marley Andrade is a 79 y.o. female with above-noted diagnoses who returns for follow-up.  The patient did have one day where she experienced frequent diarrhea.  The patient was taking Aricept as prescribed and reported that she had followed a strict vegan diet on the day in question.  Please note that the patient had experienced diarrhea previously but was not taking the medicine compliantly.  Following a phone conversation on 10/14, the patient noted no difficulties with medicine until the above-noted episode.    Today the patient is feeling well.  She offers no new complaints.          Review of Systems:          As above        Objective:   There were no vitals filed for this visit.    No results found for this or any previous visit (from the past 24 hour(s)).    Physical  Exam: Neatly groomed casually dressed, the patient seated for evaluation.  She is alert pleasant socially appropriate and directable.  Short-term memory difficulties are clearly evident at this time.  Attentional function appears to be good.  Language function also intact.  Affect is pleasant and mood congruent.

## 2021-06-05 NOTE — TELEPHONE ENCOUNTER
Prior Authorization Request  Who s requesting:  Pharmacy  Pharmacy Name and Location: [facility] #[id number]  Medication Name: eszopiclone (LUNESTA) 2 MG Tab  Insurance Plan: More than one insurance listed under the Verify Rx Benefits tab.   Insurance Member ID Number:  More than one insurance listed under the Verify Rx Benefits tab.   CoverMyMeds Key: N/A  Informed patient that prior authorizations can take up to 10 business days for response:   NA  Okay to leave a detailed message: No

## 2021-06-05 NOTE — TELEPHONE ENCOUNTER
LMTCB.  Please relay below message, that [facility] has more than one insurance plan on file for pt and in order to process prior auth for eszopiclone (Lunesta), pt needs to call [facility] and update her insurance info.

## 2021-06-05 NOTE — TELEPHONE ENCOUNTER
Medication Request  Medication name: Lunesta  [facility]  Reason for request: Montrell from [facility] stated the patient requested this Rx to filled. Caller does not know why the patient wants the Rx. Caller stated the patient informed him she is going out of town.  When did you use medication last?:  Last filled a while ago per caller  Patient offered appointment:  N/A - pharmacy calling  Okay to leave a detailed message: no

## 2021-06-05 NOTE — TELEPHONE ENCOUNTER
Medication Question or Clarification  Who is calling: Patient  What medication are you calling about (include dose and sig)?:zaleplon 5 mg caps  Who prescribed the medication?:not current  What is your question/concern?: Zaleplon was  discontined due to not formulary last June. The Lunesta needs a PA. The pharmacist told patient the zaleplon is formulary now.   Can the patient use the zaleplon instead of the Lunesta?  Patient is anxious to hear back. Please advise  [facility]ed [facility]acy: [facility]  Okay to leave a detailed message?: Yes [phone]

## 2021-06-07 NOTE — TELEPHONE ENCOUNTER
Pt called in ask about the lab test.  Inform the Pt the result is sent by mail.  Pt verbalized understand. No other concern at this time.      Александр Peterson RN, [facility] Triage/Med Refill 4/9/2020 2:55 PM

## 2021-06-07 NOTE — TELEPHONE ENCOUNTER
Pt is calling in about excessive daytime fatigue. Pt reports this has been going on for quite a while, and she would like to find a cause of this. Pt denies any other symptoms, and reports she sleeps very well at night. Pt denies fever, cough, shortness of breath or any cold symptoms.   Pt has not traveled internationally, or to a high risk area, and has not been exposed to anyone known to have tested positive for the Coronavirus.   Home care measures discussed, and per protocol, pt should be seen in the clinic within 3 days. Pt would like a note sent to the clinic to see if she could just come in for some labs.   Please call Marley at [phone] and let her know if you would like her to just come in for labs, or  If she should schedule an office visit.     Provider please advise.     Tristen Goins RN [facility] Triage/Medication Refill    Reason for Disposition    Fatigue (i.e., tires easily, decreased energy) and persists > 1 week    Protocols used: WEAKNESS (GENERALIZED) AND FATIGUE-A-[place]

## 2021-06-07 NOTE — TELEPHONE ENCOUNTER
Central PA team  [phone]  Pool: HE PA MED ([id number])          PA has been initiated.       PA form completed and faxed insurance via Cover My Meds     Key:  [id number]      Medication:  eszopiclone 2mg    Insurance:  [employer]         Response will be received via fax and may take up to 5-10 business days depending on plan

## 2021-06-07 NOTE — TELEPHONE ENCOUNTER
----- Message from Aleja Woods sent at 3/26/2020 11:42 AM CDT -----  Caller: Patient    Primary cardiologist: Dr. Hdez    Detailed reason for call: Patient stated that she would like to discuss the continued use of her heart pill (wasn't specific which it was) and that she has been having extreme fatigue for 6-9 months as well. Please advise    Best phone number: [phone]  Best time to contact: today  Ok to leave a detailed message? yes  Device? No      Dr. Hdez, patient experiencing fatigue for last 6-9 months. She would like to figure out if it is being caused by her Toprol XL. Patient would like to trial off of the med to see if she has any improvement in her symptoms. Are you okay with this plan? -jason    ----- Message -----  From: Aman Hdez MD  Sent: 3/30/2020   9:24 AM CDT  To: Billie Connolly RN    Ok to stop metoprolol for now    Called patient and relayed response above. Patient will call with any other questions or concerns. -jason

## 2021-06-07 NOTE — TELEPHONE ENCOUNTER
"Prolia Injection Phone Screen      Screening questions have been asked 2-3 days prior to administration visit for Prolia. If any questions are answered with \"Yes,\" this phone encounter were will routed to ordering provider for further evaluation.     1.  When was the last injection?  8/19/19    2.  Has insurance for this injection been verified?  Yes    3.  Did you experience any new onset achiness or rashes that lasted for over a month with your previous Prolia injection?   No    4.  Do you have a fever over 101?F or a new deep cough that is unusual for you today? No    5.  Have you started any new medications in the last 6 months that you were told could affect your immune system? These may have been prescribed by oncologist, transplant, rheumatology, or dermatology.   No    6.  In the last 6 months have you have gastric bypass or parathyroid surgery?   No    7.  Do you plan dental work requiring drilling into the bone such as implants/extractions or oral surgery in the next 2-3 months?   No    8. Do you have new insurance since the last injection? No    Patient informed if symptoms discussed above present prior to their administration appointment, they are to notify clinic immediately.     Eileen Harkins              "

## 2021-06-07 NOTE — TELEPHONE ENCOUNTER
Spoke to Marley and relayed Dr. Tovar's message - she is agreeable to come in for her injection and I rescheduled her appointment for her original time on 4/3/20

## 2021-06-07 NOTE — TELEPHONE ENCOUNTER
Medication Request  Medication name: Zolpidem or temazepam  Requested [facility]: [facility]  Reason for request: The patient is requesting to get a prescription for a sleeping medication. The patient states she has used both of these medications in the past, unsure time frame, patient states 5 to 10 years ago. The patient states whatever Aries Tovar MD recommends for sleep.   When did you use medication last?: The patient states 5-10 years ago.   Patient offered appointment:  yes The patient will schedule if a visit is required and will be a telephone visit.   Okay to leave a detailed message: yes

## 2021-06-07 NOTE — TELEPHONE ENCOUNTER
Spoke with pt and relayed pcp message.  Pt would prefer to just do some lab work, states she doesn't think talking about her fatigue would do any good.  Lab appt scheduled for 4/3/20.

## 2021-06-07 NOTE — TELEPHONE ENCOUNTER
Prolia should not be delayed too long    We are creating a safe environment here for people to get their shots.  I recommend getting it done soon.  If she wishes to delay a few weeks that is okay

## 2021-06-07 NOTE — PROGRESS NOTES
ASSESSMENT:  1. Late onset Alzheimer's disease without behavioral disturbance (H)  Initiated by Dr. Con Rausch in October with some initial gastrointestinal side effects.  Some slight improvement.  Recommend trial of increased dose.  Consider conversion to Exelon or Razadyne  - donepeziL (ARICEPT) 10 MG tablet; Take 1 tablet (10 mg total) by mouth at bedtime.  Dispense: 30 tablet; Refill: 11    2. Age-related osteoporosis without current pathological fracture  Bone density up-to-date.  Successfully received Prolia injection    3. Primary insomnia  Worsened with increased anxiety.  Reviewed medication.    4. Fatigue, unspecified type  Off metoprolol.  Reviewed labs.  Normal.  Continue to monitor    Preventive Health Care: Up-to-date, although consider a Pneumovax    PLAN:  Patient Instructions   Increase donepezil to 10 mg daily    Medicine list clarified    Labs from earlier in April.    Successful received Prolia injection    Reviewed vitamins per presence of iron or magnesium    Contact us if side effects      No orders of the defined types were placed in this encounter.      Return in about 4 months (around 8/28/2020) for a phone/video follow up visit.    CHIEF COMPLAINT:  Chief Complaint   Patient presents with     Follow-up     meds       HISTORY OF PRESENT ILLNESS:  Marley is a 79 y.o. female calling in to the clinic today requesting a refill of donepezil.  She has been diagnosed with mild cognitive impairment.  She saw Dr. Con Rausch a few times, including on his last day.  She was started on donepezil 5 mg at lunch.  This initially caused nausea and diarrhea but when she changed it to after meals, symptoms improved.  She and her  think it has helped slightly    She complains of general fatigue by phone.  Labs were ordered earlier this month which were normal.  This was reviewed with her.  She does not wish any further evaluation    She has osteoporosis and receives Prolia.  She successfully received her  injection within the last week without difficulty    REVIEW OF SYSTEMS:   Occasional insomnia.  Increased anxiety.  All other systems are negative.    PFSH:  She is quarantining at home with her     TOBACCO USE:  Social History     Tobacco Use   Smoking Status Former Smoker   Smokeless Tobacco Never Used       VITALS:  Stated Blood Pressure    Stated Pulse    Stated Weight stable    PHYSICAL EXAM:  (observations via phone)  Reasonable memory.  Slightly anxious      ADDITIONAL HISTORY SUMMARIZED (2): Reviewed geriatric notes by Dr. Con Rausch with initiation of donepezil.   contributes to history  CARE EVERYWHERE/ EXTRA INFORMATION (1):   RADIOLOGY TESTS (1): Bone density showing osteoporosis  LABS (1): Ordered and reviewed from earlier this month and normal  CARDIOLOGY/MEDICINE TESTS (1):   INDEPENDENT REVIEW (2 each):     Total data points:  4    The visit lasted a total of 19 minutes   CA intake time  5 minutes    Telephone Consent was completed by  staff and confirmed by SA    I have reviewed and updated the patient's Past Medical History, Social History, Family History as appropriate.    MEDICATIONS: Reviewed and updated per CA and MD  Current Outpatient Medications   Medication Sig Dispense Refill     acetaminophen (TYLENOL) 500 MG tablet Take 500 mg by mouth 3 (three) times a day as needed.       calcium carbonate-vitamin D2 500 mg(1,250mg) -200 unit tablet Take 1 tablet by mouth 2 (two) times a day.       cholecalciferol, vitamin D3, 2,000 unit Tab Take 1 tablet by mouth daily.       donepezil (ARICEPT) 5 MG tablet MOCA ACL CPT on rtc 30 tablet 6     eszopiclone (LUNESTA) 2 MG Tab Take 1 tablet (2 mg total) by mouth at bedtime. Take immediately before bedtime 30 tablet 1     melatonin 5 mg Tab tablet Take 5 mg by mouth at bedtime as needed.       VIT C/VIT E AC/LUT/COPPER/ZINC (PRESERVISION LUTEIN ORAL) Take 2 tablets by mouth daily.             zaleplon (SONATA) 5 MG capsule Take 1  "capsule (5 mg total) by mouth at bedtime. 30 capsule 0     donepeziL (ARICEPT) 10 MG tablet Take 1 tablet (10 mg total) by mouth at bedtime. 30 tablet 11     Current Facility-Administered Medications   Medication Dose Route Frequency Provider Last Rate Last Dose     denosumab 60 mg (PROLIA 60 mg/ml)  60 mg Subcutaneous Once Aries Tovar MD               The patient has been notified of following:     \"This telephone visit will be conducted via a call between you and your physician/provider. We have found that certain health care needs can be provided without the need for a physical exam.  This service lets us provide the care you need with a short phone conversation.  If a prescription is necessary we can send it directly to your pharmacy.  If lab work is needed we can place an order for that and you can then stop by our lab to have the test done at a later time.    If during the course of the call the physician/provider feels a telephone visit is not appropriate, you will not be charged for this service.\"    "

## 2021-06-07 NOTE — PATIENT INSTRUCTIONS - HE
Increase donepezil to 10 mg daily    Medicine list clarified    Labs from earlier in April.    Successful received Prolia injection    Reviewed vitamins per presence of iron or magnesium    Contact us if side effects

## 2021-06-07 NOTE — TELEPHONE ENCOUNTER
Prior Authorization Request  Who s requesting:  Pharmacy  Pharmacy Name and Location: [facility] #[id number]  Medication Name: eszopiclone (LUNESTA) 2 MG Tab   Insurance Plan: [employer]   Insurance Member ID Number:  [id number]  CoverMyMeds Key: N/A  Informed patient that prior authorizations can take up to 10 business days for response:   NA  Okay to leave a detailed message: No

## 2021-06-08 NOTE — PROGRESS NOTES
Patient would like to be contacted via the following phone number [phone]    ASSESSMENT:  1. Diarrhea, unspecified type  Improved off donepezil and without budesonide.  Combination of simple medicine side effect and recurrence of microscopic colitis.    2. Late onset Alzheimer's disease without behavioral disturbance (H)  Discussed resumption of donepezil, continued adjustment of dose, or trial of alternative medication.  Trial of Razadyne.  Patch offered but deferred  - galantamine (RAZADYNE ER) 8 MG 24 hr capsule; Take 1 capsule (8 mg total) by mouth daily with breakfast.  Dispense: 30 capsule; Refill: 11    3. History of psoriasis  Reviewed previous side effects on budesonide 2015 which was not an allergic reaction, but perhaps a flare of psoriasis after she stopped    4. Fatigue, unspecified type  Severe and persistent.  Reviewed labs.  No improvement off donepezil.  Trial of low-dose Adderall  - dextroamphetamine-amphetamine (ADDERALL) 5 mg Tab tablet; Take 1 tablet by mouth daily.  Dispense: 10 tablet; Refill: 0    Preventive Health Care:      PLAN:  Patient Instructions   Stop donepezil completely    Reaction to budesonide 2015 reviewed- activation of psoriasis and unrelated squamous cell cancer.  Allergies updated    Begin raise at 9 8 mg each morning    Adderall 5 mg each morning-10-day trial for fatigue    Phone contact 2 weeks      No orders of the defined types were placed in this encounter.      No follow-ups on file.    CHIEF COMPLAINT:  Chief Complaint   Patient presents with     Follow-up       HISTORY OF PRESENT ILLNESS:  Marley is a 79 y.o. female calling in to the clinic today to review her diarrhea.  When we talked last on April 28, we increased donepezil from 5 to 7.5 mg daily.  She called back on May 8 with complaints of severe diarrhea which she reports began almost immediately.  She called back on May 22 reporting severe diarrhea.  At that time I advised a combination of donepezil side  effect and recurrence of microscopic colitis.  She was advised to stop the donepezil completely.  She then called back on May 27 reporting severe diarrhea for 1 month, and that she thought it was not related to the donepezil.  Chart was reviewed, diagnosis microscopic blood in 2015 was noted, and she was prescribed budesonide.    She now reports that the diarrhea improved after stopping, in the last week has been good.  She filled the budesonide but did not take it, as she believes she had an allergic reaction to it in 2015.  Chart review indicates that she took it successfully for several months, but then upon stopping, she developed a flare of dermatitis which was ultimately diagnosed as psoriasis.  In addition, she was also diagnosed with squamous cell cancer.  She underwent surgery for that, and light therapy for the psoriasis    She continues to complain of severe fatigue with a negative work-up.  This has not improved off donepezil for 2 weeks    REVIEW OF SYSTEMS:   Poor energy.  Good sleep.  Mild anxiety.  No palpitations.  All other systems are negative.    PFSH:  Lives at home with her .  Tries to stay active running and walking    TOBACCO USE:  Social History     Tobacco Use   Smoking Status Former Smoker   Smokeless Tobacco Never Used       VITALS:  Stated Blood Pressure    Stated Pulse    Stated Weight 105lb    PHYSICAL EXAM:  (observations via phone)  Nervous.  Distractible.  Tangential.  Slightly forgetful      ADDITIONAL HISTORY SUMMARIZED (2):  Judson Raygoza and with history.  Phone calls, emails, and interactions from 2015 regarding the budesonide reviewed  CARE EVERYWHERE/ EXTRA INFORMATION (1):   RADIOLOGY TESTS (1):   LABS (1): Reviewed labs for fatigue in March  CARDIOLOGY/MEDICINE TESTS (1):   INDEPENDENT REVIEW (2 each):     Total data points:  4    The visit lasted a total of 28 minutes, from 11:01 AM to 11:29 AM  CA intake time  5 minutes    Telephone Consent was completed by  " staff and confirmed by SA    I have reviewed and updated the patient's Past Medical History, Social History, Family History as appropriate.    MEDICATIONS: Reviewed and updated per CA and MD  Current Outpatient Medications   Medication Sig Dispense Refill     budesonide (ENTOCORT EC) 3 mg 24 hr capsule Take 1 capsule (3 mg total) by mouth 2 (two) times a day. 60 capsule 2     calcium carbonate-vitamin D2 500 mg(1,250mg) -200 unit tablet Take 1 tablet by mouth 2 (two) times a day.       cholecalciferol, vitamin D3, 2,000 unit Tab Take 1 tablet by mouth daily.       dextroamphetamine-amphetamine (ADDERALL) 5 mg Tab tablet Take 1 tablet by mouth daily. 10 tablet 0     eszopiclone (LUNESTA) 2 MG Tab Take 1 tablet (2 mg total) by mouth at bedtime. Take immediately before bedtime 30 tablet 1     galantamine (RAZADYNE ER) 8 MG 24 hr capsule Take 1 capsule (8 mg total) by mouth daily with breakfast. 30 capsule 11     melatonin 5 mg Tab tablet Take 5 mg by mouth at bedtime as needed.       VIT C/VIT E AC/LUT/COPPER/ZINC (PRESERVISION LUTEIN ORAL) Take 2 tablets by mouth daily.             zaleplon (SONATA) 5 MG capsule Take 1 capsule (5 mg total) by mouth at bedtime. 30 capsule 0     Current Facility-Administered Medications   Medication Dose Route Frequency Provider Last Rate Last Dose     denosumab 60 mg (PROLIA 60 mg/ml)  60 mg Subcutaneous Once Aries Tovar MD               The patient has been notified of following:     \"This telephone visit will be conducted via a call between you and your physician/provider. We have found that certain health care needs can be provided without the need for a physical exam.  This service lets us provide the care you need with a short phone conversation.  If a prescription is necessary we can send it directly to your pharmacy.  If lab work is needed we can place an order for that and you can then stop by our lab to have the test done at a later time.    If during the " "course of the call the physician/provider feels a telephone visit is not appropriate, you will not be charged for this service.\"    "

## 2021-06-08 NOTE — TELEPHONE ENCOUNTER
Who is calling:  Patient    Reason for Call:    Calling to update PCP with new start of med:donepezil (ARICEPT) 5 MG tablet     Patients states she can't go up to two daily  until her diarrhea has cleared, which is a side effect of this med.    She is not worse but want's symptoms better improved.    She want's to wean up to two daily therapy, once diarrhea has subsided.    Date of last appointment with primary care: 04/28/2020    Okay to leave a detailed message: Yes    No callback required if PCP agrees with the plan of care.  Will follow up as needed.

## 2021-06-08 NOTE — TELEPHONE ENCOUNTER
Also history of underlying collagenous colitis/microscopic colitis diagnosed by colonoscopy several years ago.  Possible that Aricept or other medication may have reactivated this    Recommend budesonide 6 mg daily.  Will probably need 6 mg daily for 1 month then 3 mg daily for 1 month.  Begin treatment and follow-up phone visit next week

## 2021-06-08 NOTE — TELEPHONE ENCOUNTER
Noted.  As we discussed, if she is unable to tolerate the higher dose, she will need to decrease back to 5 mg daily

## 2021-06-08 NOTE — TELEPHONE ENCOUNTER
Pt will start the prescription and start that and  will stop the Aricept   Pt will follow up with you Tuesday for a phone visit     Pt requesting a call back if pcp wants her to keep taking the Aricept If not no call needed

## 2021-06-08 NOTE — TELEPHONE ENCOUNTER
Try stopping donepezil completely for 2 weeks.    Call back with update on fatigue and diarrhea at that time

## 2021-06-08 NOTE — TELEPHONE ENCOUNTER
Medication Question or Clarification  Who is calling: Patient  What medication are you calling about (include dose and sig)?:   donepeziL (ARICEPT) 10 MG tablet  30 tablet  11  4/28/2020 4/28/2021     Sig - Route: Take 1 tablet (10 mg total) by mouth at bedtime. - Oral     Sent to pharmacy as: donepeziL 10 mg tablet (Aricept)     E-Prescribing Status: Receipt confirmed by pharmacy (4/28/2020 12:28 PM CDT)         Who prescribed the medication?: Aries Tovar MD    What is your question/concern?: The patient had attempted to move on to level 2 on this medication, however the diarrhea came on with vengence, in staying at the level 1 dose her diarrhea may still occur, however not until morning.  So at least she is getting foods absorbed.  She continues to feel tired and would really like to know if the  Dr would just let her stop taking this medication.  Please advise.  Requested [facility]acy: [id number]  Okay to leave a detailed message?: Yes

## 2021-06-08 NOTE — PATIENT INSTRUCTIONS - HE
Stop donepezil completely    Reaction to budesonide 2015 reviewed- activation of psoriasis and unrelated squamous cell cancer.  Allergies updated    Begin raise at 9 8 mg each morning    Adderall 5 mg each morning-10-day trial for fatigue    Phone contact 2 weeks

## 2021-06-08 NOTE — TELEPHONE ENCOUNTER
Medication Question or Clarification  Who is calling: patient  What medication are you calling about (include dose and sig)?:     donepeziL (ARICEPT) 10 MG tablet  30 tablet  11  4/28/2020 4/28/2021     Sig - Route: Take 1 tablet (10 mg total) by mouth at bedtime. - Oral       Who prescribed the medication?: Aries Tovar MD  What is your question/concern?: Patient reports trying taking this medication multiple different ways. Patient reports medication is causing explosive diarrhea. Please advise and call patient.  [facility] Driveray to leave a detailed message?: Yes

## 2021-06-08 NOTE — TELEPHONE ENCOUNTER
"RN Triage:    Has had diarrhea x about 1 month.  There has been some thought that it may be connected to aricept.  She has been off aricept now since 5/22/20 per Dr. Tovar, and diarrhea is getting worse.  She has 4-5 episodes of diarrhea/day; sometimes in the middle of the night, but mostly several hours after meals.  Diarrhea is watery with brownish \"chunks\".  No blood.  No associated pain.  No vomiting or nausea.  Has a good appetite.  Calling today because she is feeling more tired, lightheaded and shaky.      She states: \"I don't think this diarrhea is from the aricept.  It is a different kind of diarrhea.\"  \"This happened 4 years ago, and he gave me something that worked, but it gave me terrible eczema\".    Question:  What would Dr. Tovar suggest?  She is awaiting a callback today.    Emperatriz Arceo RN  [facility]      Reason for Disposition    MODERATE diarrhea (e.g., 4-6 times / day more than normal) and present > 48 hours (2 days)    MODERATE diarrhea (e.g., 4-6 times / day more than normal) and age > 70 years    Protocols used: DIARRHEA-A-OH      "

## 2021-06-09 ENCOUNTER — RECORDS - HEALTHEAST (OUTPATIENT)
Dept: ADMINISTRATIVE | Facility: CLINIC | Age: 81
End: 2021-06-09

## 2021-06-09 NOTE — TELEPHONE ENCOUNTER
Marley calls in to report that her Preoperative Exam notes are not correct. She is not having bilateral blepharoplasty but is having bilateral cataract surgery on July a4th. Dr. Ch's office needs a corrected Exam note with the correct procedure on the note faxed to [phone].    Forwarding to Dr. Samano's office who did the Preoperative exam.

## 2021-06-09 NOTE — TELEPHONE ENCOUNTER
See telephone encounter 7/7/20.  Addended note was done indicating cataract surgery. Given to PCP to sign addendum.  Will fax once signed.

## 2021-06-09 NOTE — TELEPHONE ENCOUNTER
New Appointment Needed  What is the reason for the visit:    Pre-Op Appt Request  When is the surgery? :  7/14 and 7/28   Where is the surgery?:   [facility]  Who is the surgeon? :  Dr isbell  What type of surgery is being done? Cataract surgery  Provider Preference: Any available  How soon do you need to be seen?: Week of July 6th   Waitlist offered?: No  Okay to leave a detailed message:  Yes

## 2021-06-09 NOTE — TELEPHONE ENCOUNTER
Who is calling:  [facility]  Reason for Call:  Pre-op needs to be addend, states that the patient is having blepharoplasty. However the patient is having cataract surgery. please update pre-op and fax over to the [facility]   Fax [phone]  Date of last appointment with primary care: 06/29/20  Okay to leave a detailed message: Yes

## 2021-06-09 NOTE — TELEPHONE ENCOUNTER
1400: followed up with Ernestina from [facility], she would check with JENNYFER Johnson if fax has been sent.    1458: Checked in with CIERA Marley who is still awaiting fax from clinic. I called and spoke with Ernestina again who assured me Dr. Tovar was aware of need.

## 2021-06-09 NOTE — PROGRESS NOTES
Preoperative Exam    Scheduled Procedure: blepharoplasty, bilateral  Surgery Date:  July 14, and 28, 2020  Surgery Location: [facility] [place]    Surgeon:  Dr. Ch    Assessment/Plan:     1. Preoperative examination  She is medically stable.  I find no contraindication to having the bilateral blepharoplasty.  I recommend proceeding as planned.  Her EKG is normal, with NSR  - Electrocardiogram Perform and Read     2. Blepharoptosis, acquired, bilateral  Causing visual symptoms.     3. Late onset Alzheimer's disease without behavioral disturbance   Mostly memory disorder.  Taking namenda without side effects, feels it is helping. She is fully oriented today.     Surgical Procedure Risk: Low (reported cardiac risk generally < 1%)  Have you had prior anesthesia?: Yes  Have you or any family members had a previous anesthesia reaction:  No  Do you or any family members have a history of a clotting or bleeding disorder?: No  Cardiac Risk Assessment: no increased risk for major cardiac complications    APPROVAL GIVEN to proceed with proposed procedure, without further diagnostic evaluation  Functional Status: Partially Dependent: mild dementia  Patient plans to recover at home with family.     Subjective:      Marley Andrade is a 79 y.o. female who presents for a preoperative consultation. She has consented to having bilateral blepharoplasty.  She has been having more problems with visual interference.  She otherwise has not been ill.  No unusual dyspnea, or cough, or fever.  Her memory disorder and mild dementia has not progressed.  Memory problems persist, she gets a great deal of help from her .      All other systems reviewed and are negative, other than those listed in the HPI.    Pertinent History  Do you have difficulty breathing or chest pain after walking up a flight of stairs: No  History of obstructive sleep apnea: No  Steroid use in the last 6 months: No  Frequent Aspirin/NSAID use: No  Prior Blood  Transfusion: No  Prior Blood Transfusion Reaction: No  If for some reason prior to, during or after the procedure, if it is medically indicated, would you be willing to have a blood transfusion?:  There is no transfusion refusal.    Current Outpatient Medications   Medication Sig Dispense Refill     calcium carbonate-vitamin D2 500 mg(1,250mg) -200 unit tablet Take 1 tablet by mouth 2 (two) times a day.       cholecalciferol, vitamin D3, 2,000 unit Tab Take 1 tablet by mouth daily.       eszopiclone (LUNESTA) 2 MG Tab Take 1 tablet (2 mg total) by mouth at bedtime. Take immediately before bedtime 30 tablet 1     melatonin 5 mg Tab tablet Take 5 mg by mouth at bedtime as needed.       memantine (NAMENDA) 5 MG tablet Take 1 tablet (5 mg total) by mouth 2 (two) times a day. 60 tablet 11     VIT C/VIT E AC/LUT/COPPER/ZINC (PRESERVISION LUTEIN ORAL) Take 2 tablets by mouth daily.             zaleplon (SONATA) 5 MG capsule Take 1 capsule (5 mg total) by mouth at bedtime. 30 capsule 0     Current Facility-Administered Medications   Medication Dose Route Frequency Provider Last Rate Last Dose     denosumab 60 mg (PROLIA 60 mg/ml)  60 mg Subcutaneous Once Aries Tovar MD          Allergies   Allergen Reactions     Budesonide      Activated Psoriasis     Hydrocodone-Acetaminophen Nausea And Vomiting     Morphine Nausea Only     Oxycodone Nausea And Vomiting     Topiramate      Patient Active Problem List   Diagnosis     Cholelithiasis     Palpitations     Age-related osteoporosis without current pathological fracture     Seborrheic Dermatitis     Anxiety     Ectopic atrial tachycardia (H)     Late onset Alzheimer's disease without behavioral disturbance (H)     History of psoriasis     Dissection, iliac artery (H)     Blepharoptosis, acquired, bilateral     Past Medical History:   Diagnosis Date     Atrial flutter (H)      Blepharoptosis, acquired, bilateral 6/29/2020     Dementia (H)      Hx antineoplastic  "chemotherapy      Osteoporosis      Past Surgical History:   Procedure Laterality Date     HYSTERECTOMY       OOPHORECTOMY       Social History     Socioeconomic History     Marital status:      Spouse name: Not on file     Number of children: Not on file     Years of education: Not on file     Highest education level: Not on file   Occupational History     Not on file   Social Needs     Financial resource strain: Not on file     Food insecurity     Worry: Not on file     Inability: Not on file     Transportation needs     Medical: Not on file     Non-medical: Not on file   Tobacco Use     Smoking status: Former Smoker     Smokeless tobacco: Never Used   Substance and Sexual Activity     Alcohol use: Not Currently     Drug use: Not on file     Sexual activity: Not on file   Lifestyle     Physical activity     Days per week: Not on file     Minutes per session: Not on file     Stress: Not on file   Relationships     Social connections     Talks on phone: Not on file     Gets together: Not on file     Attends Anabaptism service: Not on file     Active member of club or organization: Not on file     Attends meetings of clubs or organizations: Not on file     Relationship status: Not on file     Intimate partner violence     Fear of current or ex partner: Not on file     Emotionally abused: Not on file     Physically abused: Not on file     Forced sexual activity: Not on file   Other Topics Concern     Not on file   Social History Narrative     Not on file     Patient Care Team:  Aries Tovar MD as PCP - General  Yared Amador PharmD as Pharmacist (Pharmacist)    Objective:     Vitals:    06/29/20 0942   BP: 102/62   Pulse: 74   SpO2: 98%   Weight: 100 lb (45.4 kg)   Height: 5' 4\" (1.626 m)     PHYSICAL EXAM:  General Appearance: In no acute distress    /62 (Patient Site: Right Arm, Patient Position: Sitting, Cuff Size: Adult Regular)   Pulse 74   Ht 5' 4\" (1.626 m)   Wt 100 lb (45.4 kg)   " SpO2 98%   BMI 17.16 kg/m    EYES: bilateral blepharoptosis  NECK:  without cervical or axillary adenopathy  RESPIRATORY: Clear  CARDIOVASCULAR: S1, S2  ABDOMEN: soft, flat, and non-tender, without mass, rebound, or guarding  EXTREMITIES: No joint swelling, no ulcer or edema  NEUROLOGIC: No arm or leg  weakness, speech is clear, gait is normal  PSYCHIATRIC: Oriented X 3, without confusion, behavior and affect normal, thinking is clear    Labs:  Recent Results (from the past 48 hour(s))   Electrocardiogram Perform and Read    Collection Time: 06/29/20  2:06 PM   Result Value Ref Range                                                                             ISinus bradycardia First degree A-V block  Low voltage QRS  When compared with ECG of 4/24/2019  No significant change was found  Reconfirmed by PALLAVI MILLER, LES LOC: ([id number]) on 6/29/2020 3:51:00 PM        Immunization History   Administered Date(s) Administered     DT (pediatric) 10/11/2005     Influenza, inj, historic,unspecified 11/20/2007, 10/29/2008, 10/19/2009     Influenza, seasonal,quad inj 6-35 mos 01/16/2014     Influenza,seasonal, Inj IIV3 11/20/2007, 10/29/2008, 12/13/2010, 09/26/2011, 01/16/2014     Pneumo Conj 13-V (2010&after) 04/12/2017     Pneumo Polysac 23-V 10/11/2005     Td, Adult, Absorbed 10/11/2005     Td,adult,historic,unspecified 10/11/2005     Tdap 04/12/2017     ZOSTER, LIVE 09/04/2009     Electronically signed by Artur Samano MD 06/29/20 10:29 AM

## 2021-06-09 NOTE — TELEPHONE ENCOUNTER
RN Triage  Marley is having Bilateral cataract surgery tomorrow at [facility]. Pre-op note from 6/29/2020 with Dr. Samano indicates she is having bilateral blepharoplasty. This is incorrect. She is actually having bilateral cataract surgery tomorrow morning. CIERA Marley from [facility] needs an addendum to pre-op H/P faxed to her prior to proceeding with Marley's surgery. This is the 3rd call that has been made regarding this request.    URGENT review and advise Ayaka VANG  Telephone number [phone]  Fax Number [phone]    Reason for Disposition    Nursing judgment    Protocols used: INFORMATION ONLY CALL - NO TRIAGE-A-OH

## 2021-06-09 NOTE — PROGRESS NOTES
Patient would like to be contacted via the following phone number [phone]    ASSESSMENT:  1. Late onset Alzheimer's disease without behavioral disturbance (H)  Diarrhea and fatigue on Aricept and Razadyne.  Improved symptoms off both.  Trial of memantine  - memantine (NAMENDA) 5 MG tablet; Take 1 tablet (5 mg total) by mouth 2 (two) times a day.  Dispense: 60 tablet; Refill: 11    2. Anxiety  Stable    3.  Diarrhea.  With history of microscopic colitis.  Resolved off cholinesterase inhibitors and no budesonide    Preventive Health Care:      PLAN:  Patient Instructions   Discontinue Razadyne    Discontinue Adderall    Discontinue budesonide    Memantine 5 mg once daily for 2 weeks then 5 mg twice daily    Call if problems otherwise see for cataract preop      No orders of the defined types were placed in this encounter.      Return if symptoms worsen or fail to improve.    CHIEF COMPLAINT:  Chief Complaint   Patient presents with     Follow-up     2 wk - Advese reaction to Adderall       HISTORY OF PRESENT ILLNESS:  Marley is a 79 y.o. female calling in to the clinic today for follow up on medication changes.  At her last visit she was given Razadyne to replace Aricept.  This also caused some diarrhea and fatigue.  She then stopped Razadyne.  Diarrhea resolved.  Her complaints of chronic fatigue also resolved.  She has not noticed a significant lapse in memory.  She overall feels much better    She was diagnosed with memory impairment approximately 2 years ago    REVIEW OF SYSTEMS:   Improved fatigue.  Resolved diarrhea.  No chest pain or shortness of breath.  All other systems are negative.    PFSH:  Likes to exercise.  Lives with  Judson    TOBACCO USE:  Social History     Tobacco Use   Smoking Status Former Smoker   Smokeless Tobacco Never Used       VITALS:  Stated Blood Pressure     Stated Pulse     Stated Weight      PHYSICAL EXAM:  (observations via phone)  Nervous.  Distractible.      ADDITIONAL  "HISTORY SUMMARIZED (2): Reviewed neurobehavior visits last year  CARE EVERYWHERE/ EXTRA INFORMATION (1):   RADIOLOGY TESTS (1):   LABS (1): Up-to-date in March due to fatigue  CARDIOLOGY/MEDICINE TESTS (1):   INDEPENDENT REVIEW (2 each):     Total data points:  3    The visit lasted a total of 16 minutes, from 12:02 PM to 12:18 PM  CA intake time  3 minutes    Telephone Consent was completed by  staff and confirmed by MB    I have reviewed and updated the patient's Past Medical History, Social History, Family History as appropriate.    MEDICATIONS: Reviewed and updated per CA and MD  Current Outpatient Medications   Medication Sig Dispense Refill     calcium carbonate-vitamin D2 500 mg(1,250mg) -200 unit tablet Take 1 tablet by mouth 2 (two) times a day.       cholecalciferol, vitamin D3, 2,000 unit Tab Take 1 tablet by mouth daily.       eszopiclone (LUNESTA) 2 MG Tab Take 1 tablet (2 mg total) by mouth at bedtime. Take immediately before bedtime 30 tablet 1     melatonin 5 mg Tab tablet Take 5 mg by mouth at bedtime as needed.       VIT C/VIT E AC/LUT/COPPER/ZINC (PRESERVISION LUTEIN ORAL) Take 2 tablets by mouth daily.             zaleplon (SONATA) 5 MG capsule Take 1 capsule (5 mg total) by mouth at bedtime. 30 capsule 0     memantine (NAMENDA) 5 MG tablet Take 1 tablet (5 mg total) by mouth 2 (two) times a day. 60 tablet 11     Current Facility-Administered Medications   Medication Dose Route Frequency Provider Last Rate Last Dose     denosumab 60 mg (PROLIA 60 mg/ml)  60 mg Subcutaneous Once Aries Tovar MD               The patient has been notified of following:     \"This telephone visit will be conducted via a call between you and your physician/provider. We have found that certain health care needs can be provided without the need for a physical exam.  This service lets us provide the care you need with a short phone conversation.  If a prescription is necessary we can send it directly to " "your pharmacy.  If lab work is needed we can place an order for that and you can then stop by our lab to have the test done at a later time.    If during the course of the call the physician/provider feels a telephone visit is not appropriate, you will not be charged for this service.\"   "

## 2021-06-09 NOTE — TELEPHONE ENCOUNTER
FYI - Status Update  Who is Calling: Patient  Update: Patient so happy to report the last medications changes did the trick.  She is so much better.  She wants to send virtual hug to Dr Tovar and staff for hanging in with her through this.  LITZY  Okay to leave a detailed message?:  No return call needed

## 2021-06-09 NOTE — PATIENT INSTRUCTIONS - HE
Discontinue Razadyne    Discontinue Adderall    Discontinue budesonide    Memantine 5 mg once daily for 2 weeks then 5 mg twice daily    Call if problems otherwise see for cataract preop

## 2021-06-09 NOTE — TELEPHONE ENCOUNTER
Just wanting to follow up- has this been faxed yet? RN was eager to get fax so surgery can proceed.     Thanks!

## 2021-06-09 NOTE — PROGRESS NOTES
Previously tolerated Prolia, last injection on 2/2015. Last DXA 3/18/15, however there was a gap in treatment over the course of the last two years. Recommend restart Prolia and reassess DXA in one year. May consider consult with Dr. Saldana or Maryanne after follow up DXA. Recommend BMP and vitamin D levels as these have not been assessed since 2015.   
Right arm;

## 2021-06-10 NOTE — PROGRESS NOTES
Consultation - [facility]  Marley Andrade,  1940, MRN [id number]    PCP: Aries Tovar MD, [phone]    Assessment and Plan: History of atrial flutter with successful ablation.  Palpitations.  Recommendations: ECG is being obtained today.  Arrange for 24-hour Holter monitor and then determine evaluation by EP clinic.  I did feel one run of some irregularity during my assessment my impression was that it may have been a recurrent series of cycles of a premature atrial complex but it was happened so quick and adjusted became my exam and then did not recur despite prolonged observation.    Chief Complaint: Palpitations    HPI:  We have been requested by Dr. Charles to evaluate Marley Andrade for consultation who is a  76 y.o. year old female for palpitation.   Hx: 76-year-old woman new to me seen for palpitations.  Patient has had a past history of paroxysmal atrial flutter for which she underwent successful ablation with Dr. Altman.  She states since that time she has not had any recurrence of this arrhythmia or symptoms of palpitations.  Were she states that over the past year she has began to become aware of occasional episodes of irregular heartbeats.  It seems to become more more prominent over the past 2-3 months.  She cannot think of anything she does have provokes the symptom.  It is never a rapid tachycardia just feels like a pounding irregular beat.  It is generally brief but can last for longer periods of time she is somewhat vague as to this detail.  It has no associated symptoms.  She has not had dizziness or actual syncope.  No history of hypertension diabetes stroke or TIA.    Medical History  Active Ambulatory (Non-Hospital) Problems    Diagnosis     Palpitation     Calculus of gallbladder with acute on chronic cholecystitis without obstruction     Anxiety     Osteoarthritis     Diverticulosis     Atrial flutter     Gastritis     Cholelithiasis     Dizziness     Palpitations  "    Collagenous Colitis     Localized Primary Osteoarthritis Of The Carpometacarpal Joint     Osteoporosis     Diarrhea     Conjunctivitis     Hemorrhoids     Seborrheic Dermatitis     Eczema     Past Medical History:   Diagnosis Date     Hx antineoplastic chemotherapy        Surgical History  She  has a past surgical history that includes total abdom hysterectomy; Hysterectomy; and Oophorectomy.    Social History  Reviewed, and she  reports that she has quit smoking. She does not have any smokeless tobacco history on file.  Smoking status reviewed.  Social history othrwise not contributory to HPI.  Allergies  Allergies   Allergen Reactions     Morphine      Topiramate        Family History  Reviewed, and family history is not on file.  Extended Emergency Contact Information  Primary Emergency Contact: Judson Andrade   [facility]  Home Phone: [phone]  Relation: Spouse  Family history otherwise negative or not conributory to HPI.    Psychosocial Needs  Social History     Social History Narrative     Additional psychosocial needs reviewed per nursing assessment.    Prior to Admission Medications    (Not in a hospital admission)    Review of Systems:  A 12 point comprehensive review of systems was negative except as noted.  Review of systems is negative except for HPI  Physical Exam:  [unfilled]    /60 (Patient Site: Left Arm, Patient Position: Sitting, Cuff Size: Adult Regular)  Pulse 76  Resp 18  Ht 5' 3.5\" (1.613 m)  Wt 101 lb (45.8 kg)  BMI 17.61 kg/m2  No acute distress.  Head and neck normal.  External eye normal.  External ear normal.  JVD flat.  Carotid upstrokes normal.  No bruit.  No thyromegaly.  No LAD.  Chest clear.  Heart tones S1-S2 normal distinct.  On prolonged monitoring of pulse and auscultation over about a 45 minute interval the pulse was regular but I 1 brief moment appeared to have some irregularity to suggestion of this cystic sequence of premature atrial beats " without compensatory pausing.    Pertinent Labs  Lab Results: personally reviewed.   Lab Results   Component Value Date    CHOL 179 04/12/2017    TRIG 62 04/12/2017    HDL 66 04/12/2017    recent thyroid and lipid panels normal.    Pertinent Radiology  Radiology Results: See Report  EKG Results: personally reviewed.  and See Report

## 2021-06-10 NOTE — PROGRESS NOTES
ASSESSMENT:  1. Palpitations  Reviewed old history of atrial flutter and ablation in 2007.  Now new palpitations.  History suggests more routine PACs or PVCs however atrial fibrillation is not excluded.  Doubt malignant arrhythmia.  EKG today normal    Reassured.  Needs Holter monitor and rapid axis cardiology evaluation  - Ambulatory referral to [facility]  - Electrocardiogram Perform - Clinic    2. Healthcare maintenance  - Mammo Screening Bilateral; Future    3.  Anxiety.  Reviewed email and initiation of nortriptyline and lorazepam by email last week.  In this context, lorazepam can be continued but would stop as needed use of nortriptyline    4.  Cholelithiasis.  Reviewed surgical note.  HIDA scan later this week    PLAN:  Patient Instructions   Ekg today    [facility]apid [facility] cardiology clinic for these palpitations. They will begin proper testing and treatment.  Dr Altman did the ablation 10 years ago    Do not take the Nortriptyline night time med    Do take the Lorazepam as needed for any of these episodes, up to every 6 hours apart.    I see Dr Perez's note, and he ordered a HIDA scan to see how your gallbladder functions.  If the function is poor, or the test causes you symptoms of nausea or pain, then you should have your gallbladder removed.  If the gallbladder is fine, and you have no symptoms during the test, then you do not need gallbladder surgery      Total data points: 7. Total time:  38 minutes.     Orders Placed This Encounter   Procedures     Mammo Screening Bilateral     Standing Status:   Future     Standing Expiration Date:   8/15/2018     Order Specific Question:   Patient's previous breast density:     Answer:   Heterogeneously dense [3]     Order Specific Question:   Can the procedure be changed per Radiologist protocol?     Answer:   Yes     Ambulatory referral to [facility]     Referral Priority:   Routine     Referral Type:   Consultation     Referral Reason:   Evaluation  "and Treatment     Requested Specialty:   Cardiology     Number of Visits Requested:   1     Electrocardiogram Perform - Clinic     Medications Discontinued During This Encounter   Medication Reason     nortriptyline (PAMELOR) 10 MG capsule        No Follow-up on file.    CHIEF COMPLAINT:  Chief Complaint   Patient presents with     Palpitations       HISTORY OF PRESENT ILLNESS:  Marley is a 76 y.o. female presenting to the clinic today with complaints of heart palpitations. She reports that she has been waking up with a rapid heart beat, which scares her and keeps her up.  She had two of these episodes within the last two nights and intermittently for the past 3 weeks. She estimates that she wakes at 1 AM after going to bed at 10 with a heavy chest and some shortness of breath. Last night she took nortriptyline but it took her 3-4 hours to fall back to sleep and woke at 7, though felt very exhausted and tired. She has also noticed brief episodes of palpitations during the day but they are very transient. She recalls that she experienced similar palpitations in the past which were diagnosed as paroxysmal atrial flutter. She received an ablation for the flutter in 2007. Of note, about a year ago in April she presented to [facility] ER complaining of dizziness and then two days later for evaluation of left sided chest heaviness that was accompanied by heart palpitations while in [place]. However, her symptoms subsided after approximately twenty minutes. She feels very scared that she will have a stroke or a heart attack.     REVIEW OF SYSTEMS:   She denies any wheezing.   All other systems are negative.    PFSH:  She enjoys golfing.     TOBACCO USE:  History   Smoking Status     Former Smoker   Smokeless Tobacco     Not on file       VITALS:  Vitals:    05/15/17 1542   BP: 106/70   Pulse: 72   Resp: 14   Weight: 101 lb 4.8 oz (45.9 kg)   Height: 5' 3.5\" (1.613 m)     Wt Readings from Last 3 Encounters:   05/15/17 101 lb 4.8 oz " (45.9 kg)   04/12/17 101 lb 12.8 oz (46.2 kg)   03/18/15 102 lb 11.2 oz (46.6 kg)       PHYSICAL EXAM:  Constitutional:  Reveals an alert, pleasant, nervous, slightly hard of hearing female. Not obviously uncomfortable.  Vitals:  Per nursing notes.  Cardiac:  Regular rate and rhythm without murmurs, rubs, or gallops.  Legs without edema. Palpation of the radial pulse regular but with grouped periods of skipped, irregular beats.   Lungs: Clear.  Respiratory effort normal.  Neurologic:  Cranial nerves II-XII intact.     Psychiatric:  Mood appropriate, memory intact.   EKG:  Normal sinus rhythm, no ectopy.     ADDITIONAL HISTORY SUMMARIZED (2): Nurse triage notes reviewed from 5/15/2017 regarding heart palpitations. Emergency room notes from 4/21/2016 and 4/19/2016 reviewed regarding chest pain and dizziness.  Notes from Dr. Perez reviewed from 5/1/2017 regarding gallstones.   DECISION TO OBTAIN EXTRA INFORMATION (1): Care everywhere accessed.   RADIOLOGY TESTS (1): None.  LABS (1): Labs reviewed from 4/12/2017  MEDICINE TESTS (1): EKG ordered.  INDEPENDENT REVIEW (2 each): EKG personally interpreted.     The visit lasted a total of 38 minutes face to face with the patient. Over 50% of the time was spent counseling and educating the patient about heart palpations and atrial flutter.    ILuke, am scribing for and in the presence of, Dr. Tovar.    I, Dr. Tovar, personally performed the services described in this documentation, as scribed by Luke Vick in my presence, and it is both accurate and complete.    MEDICATIONS:  Current Outpatient Prescriptions   Medication Sig Dispense Refill     calcium carbonate-vitamin D2 500 mg(1,250mg) -200 unit tablet Take 1 tablet by mouth 2 (two) times a day.       LORazepam (ATIVAN) 0.5 MG tablet Take 1 tablet (0.5 mg total) by mouth every 8 (eight) hours as needed for anxiety. 20 tablet 0     raloxifene (EVISTA) 60 mg tablet TAKE 1 TABLET BY MOUTH DAILY 90  tablet 0     zolpidem (AMBIEN) 5 MG tablet TAKE 1 TABLET BY MOUTH EVERY NIGHT AT BEDTIME AS NEEDED. 30 tablet 0     Current Facility-Administered Medications   Medication Dose Route Frequency Provider Last Rate Last Dose     denosumab 60 mg (PROLIA 60 mg/ml)  60 mg Subcutaneous Q6 Months Aries Tovar MD   60 mg at 02/20/17 0958       Total data points:  7.   TIME IN:3:50 PM  TIME OUT: 4:20 PM  TIME IN: 4:36 PM  TIME OUT: 4:44 PM

## 2021-06-10 NOTE — TELEPHONE ENCOUNTER
Prior Authorization Request  Who s requesting:  Pharmacy  Ph[id number]  Medication Name: memantine (NAMENDA) 5 MG tablet   Insurance Plan: More than one insurance listed under the Verify Rx Benefits tab.   Insurance Member ID Number:  More than one insurance listed under the Verify Rx Benefits tab.   CoverMyMeds Key: N/A  Informed patient that prior authorizations can take up to 10 business days for response:   NA  Okay to leave a detailed message: No

## 2021-06-10 NOTE — PROGRESS NOTES
[facility] Certification Request    May 3, 2017      Patient: Marley Andrade  MR Number: [id number]  YOB: 1940  Date of Visit: 5/3/2017      Dear Dr. Aries Tovar:    Thank you for this referral.   We are seeing Marley Andrade in Physical Therapy for imbalance.    Medicare and/or Medicaid requires physician review and approval of the treatment plan. Please review the plan of care and verify that you agree with the therapy plan of care by co-signing this note.      Plan of Care  Authorization / Certification Start Date: 05/03/17  Authorization / Certification End Date: 08/01/17  Authorization / Certification Number of Visits: 12  Communication with: Referral Source  Patient Related Instruction: Nature of Condition;Treatment plan and rationale;Self Care instruction;Basis of treatment;Next steps;Expected outcome  Number of Visits: up to 12  Neuromuscular Reeducation: vestibular    Goals:  Pt. will be independent with home exercise program in : 4 weeks;Comment  Comment:: for decreased intensity and frequency of sensations of imbalance  No Data Recorded      If you have any questions or concerns, please don't hesitate to call.    Sincerely,      Koko Florence, PT        Physician recommendation:     ___ Follow therapist's recommendation        ___ Modify therapy      *Physician co-signature indicates they certify the need for these services furnished within this plan and while under their care.    [facility]   Vestibular Initial Evaluation    Patient Name: Marley Andrade  Date of evaluation: 5/3/2017  PRECAUTIONS: none  Referral Diagnosis: Imbalance (12 visits max)  Referring provider: Aries Tovar MD  Visit Diagnosis:     ICD-10-CM    1. Imbalance R26.89        Assessment:      Pt presents to PT with intermittent sensation of imbalance/awareness of her head/brain fuzzy-fogginess. Pt with some difficultly describing sx, beyond the fact that sx seem to be pretty  "consistently inconsistent. Positional testing for BPPV negative. Eye-head tracking testing negative for VOR malfunction, although due to decreased specificity as compared to use of specialized equipment, the patient may be experiencing signs of VOR-related dysfunction. Pt is agreeable to trial gaze stability/adaption-based treatment theory to recalibrate the vestibular system. If no further improvement within the next 2-3 weeks, encouraged  follow-up with ENT for further evaluation of sx.    Goals:  Pt. will be independent with home exercise program in : 4 weeks;Comment  Comment:: for decreased intensity and frequency of sensations of imbalance    Patient's expectations/goals are moderately realistic    Barriers to Learning or Achieving Goals:  No Barriers.       Plan / Patient Instructions:        Plan of Care:   Authorization / Certification Start Date: 05/03/17  Authorization / Certification End Date: 08/01/17  Authorization / Certification Number of Visits: 12  Communication with: Referral Source  Patient Related Instruction: Nature of Condition;Treatment plan and rationale;Self Care instruction;Basis of treatment;Next steps;Expected outcome  Number of Visits: up to 12  Neuromuscular Reeducation: vestibular    Plan for next visit: Plan to check back in 2-3 weeks regarding sx. Review gaze stabilization ex and progress as tolerated.     Subjective:         History of Present Illness:    Marley is a 76 y.o. female who presents to therapy today with complaints of an overall feeling of \"awareness of my head\" or in other words, \"a foggy brain.\"  Denies dizziness or HA.   Onset: Chronic  Duration: Intermittent  Worse with random  Better with random.  \"When it is there, I just feel different. Feels like a cloud around my head.\" Pt with some difficulty describing sx further, beyond the fact that they present consistently inconsistent. No known aggravating or alleviating factors. It just happens. \"It does not limit me. I " "still do everything that I need to do. But it is an annoyance.\"    Intensity of symptoms:  Currently: 0/10  At best: 0/10  At worst: Mild     Objective:      Note: Items left blank indicates the item was not performed or not indicated at the time of the evaluation.    Patient Outcome Measures :    Lower Extremity Functional Scale (_/80): 52   Scores range from 0-80, where a score of 80 represents maximum function. The minimal clinically important difference is a positive change of 9 points.  Other Test Score: DHI: 2%     Vestibular Disorder Examination  1. Imbalance       Precautions/Restrictions: None  Posture Observation:      General sitting posture is  normal.  General standing posture is normal.    Gait Observation: WNL without obvious deviations. No LOB and no AD used.    ROM:  WNL    Strength: Not Tested    Sensation: Normal    Test of Provocation: WNL, bilaterally.    Oculomotor Assessment:  Spontaneous Nystagmus: none  Gaze-holding Nystagmus: none  Smooth Pursuit: WNL  Saccadic Mvmts: WNL  VOR to Slow Head Mvmt: WNL  VOR Head Thrust: WNL  VOR Cancellation: WNL  Optokinetic Nystagmus: N/T  Static and Dynamic Visual Acuity: N/T    Positional Tests:  Hallpike Right:  Negative  Hallpike Left:  Negative    Balance Assessment:  Rhomberg - Eyes Open:  30 seconds  Rhomberg - Eyes Closed:  30 seconds  Rhomberg - Perturbed Surface with Eyes Open:  30 seconds  Rhomberg - Perturbed Surface with Eyes Closed:  30 seconds with mild>moderate sway    Treatment Today     TREATMENT MINUTES COMMENTS   Evaluation 25    Self-care/ Home management     Manual therapy     Neuromuscular Re-education 15 Discussed therapy diagnosis, prognosis, POC, relevant anatomy and basis for treatment.  -Reviewed and performed HEP with handout provided.   Therapeutic Activity     Therapeutic Exercises     Gait training     Modality__________________                Total 40    Blank areas are intentional and mean the treatment did not include these " items.              PT Evaluation Code: (Please list factors)  Patient History/Comorbidities: none  Examination: Vestibular  Clinical Presentation: Stable  Clinical Decision Making: Low    Patient History/  Comorbidities Examination  (body structures and functions, activity limitations, and/or participation restrictions) Clinical Presentation Clinical Decision Making (Complexity)   No documented Comorbidities or personal factors 1-2 Elements Stable and/or uncomplicated Low   1-2 documented comorbidities or personal factor 3 Elements Evolving clinical presentation with changing characteristics Moderate   3-4 documented comorbidities or personal factors 4 or more Unstable and unpredictable High           Koko Florence  5/3/2017  2:07 PM    [facility] Discharge Summary  Patient Name: Marley Andrade  Date: 6/5/2017  Referral Diagnosis: Imbalance (12 visits max)  Referring provider: Aries Tovar MD  Visit Diagnosis:   1. Imbalance         Goals:  Pt. will be independent with home exercise program in : 4 weeks;Comment  Comment:: for decreased intensity and frequency of sensations of imbalance  PROGRESSING    Patient was seen for initial evaluation and treatment on 05/03/17 only.  Please see above assessment and plan.  The patient discontinued therapy, did not return.    Therapy will be discontinued at this time.  The patient will need a new referral to resume.    Thank you for your referral.  Koko Florence  6/5/2017  5:02 PM

## 2021-06-10 NOTE — TELEPHONE ENCOUNTER
FYI - Status Update  Who is Calling: Patient  Update: The patient states she has increased the Namenda 1 extra tablet a day, depending on how the additional dose responds she will increase to 3 tablets per day but not for a month or less, and to let Aries Tovar MD know the eye surgery went well.  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 The patient states she will be seeing a local dermatologist, Dr. Alcantar for surgery.   Okay to leave a detailed message?:  No return call needed

## 2021-06-10 NOTE — PROGRESS NOTES
ASSESSMENT:  1. Calculus of gallbladder with acute on chronic cholecystitis without obstruction  Intermittent deficit episodes of brief right upper quadrant midepigastric pain which are not exertional suggest cholelithiasis.  Review of records indicates ultrasound confirmation of gallstone 2012 when she was admitted for rule out cardiac.  Check labs.  Check amylase and lipase.  Refer for surgical movable  - Ambulatory referral to General Surgery    2. Right upper quadrant abdominal pain  Rule out common duct stone  - Amylase  - Lipase    3. Fatigue, unspecified type  Describes as brain fog.  Full metabolic evaluation.  Confirm that she is taking Ambien only rarely    Occult cholecystitis in this very sensitive woman could explain symptoms  - Comprehensive Metabolic Panel  - Erythrocyte Sedimentation Rate  - HM2(CBC w/o Differential)  - Lipid Cascade  - Thyroid Stimulating Hormone (TSH)  - Urinalysis-UC if Indicated  - Vitamin B12    4. Vitamin D deficiency  Recheck and monitor  - Vitamin D, Total (25-Hydroxy)    5. Healthcare maintenance  Update shots  - Pneumococcal conjugate vaccine 13-valent 6wks-17yrs; >50yrs  - Tdap vaccine greater than or equal to 8yo IM    6. Osteoporosis  Update bone density on Prolia and Evista.  May need to discontinue Evista  - DXA Bone Density Scan; Future        PLAN:  Patient Instructions   For the brain fog, my first suspicion is the Ambien    The most common reason for abdominal pain would be gallstones or ulcer.  Attacks are most commonly gallstones, which occur minutes to hours after eating, and go away.  They are not exercise related    Blood tests to look at liver and pancreas tests    You had gallstones back in 2012, and now you have symptoms            Orders Placed This Encounter   Procedures     DXA Bone Density Scan     Standing Status:   Future     Standing Expiration Date:   4/12/2018     Order Specific Question:   Can the procedure be changed per Radiologist protocol?      Answer:   Yes     Pneumococcal conjugate vaccine 13-valent 6wks-17yrs; >50yrs     Tdap vaccine greater than or equal to 6yo IM     Amylase     Lipase     Ambulatory referral to General Surgery     Referral Priority:   Routine     Referral Type:   Surgical     Referral Reason:   Evaluation and Treatment     Referred to Provider:   Artur Perez MD     Requested Specialty:   General Surgery     Number of Visits Requested:   1     There are no discontinued medications.    No Follow-up on file.    CHIEF COMPLAINT:  Chief Complaint   Patient presents with     Abdominal Pain     Foggy Brain       HISTORY OF PRESENT ILLNESS:  Marley is a 76 y.o. female presenting to the clinic today complaining of abdominal pain.  She reports that when last seen 2 years ago she had collagenous colitis.  After finishing Entocort, she developed psoriasis.  This was probably triggered by the steroids.  By email we did this again one year later.  She has consulted with dermatology and mail and her .  With diet adjustment and homeopathic remedies her psoriasis is largely cleared    Unfortunately last week she began to develop episodes of mid abdominal pain.  This occurred in the midmorning and slightly after she had eaten something for breakfast.  Pain was sharp severe stabbing midepigastric and radiating slightly into her chest.  It lasted 5-6 minutes then resolved.  With this she felt palpitations and a surgeon of her blood pressure.  She also felt nauseated but did not vomit    She was admitted to the hospital in 2012 for chest pain which was deemed to be noncardiac.  GI evaluation at that time showed gastritis and one solitary gallstone    REVIEW OF SYSTEMS:   Psoriasis is mostly resolved.  Skin cancer was removed from the leg.  Weight has been slightly decreased.  She suffers from intermittent anxiety.  All other systems are negative.    PFSH:  She does not smoke.  She is .  She golfs and plays bridge    TOBACCO  "USE:  History   Smoking Status     Former Smoker   Smokeless Tobacco     Not on file       VITALS:  Vitals:    04/12/17 1447   BP: 90/52   Pulse: 75   Resp: (!) 96   SpO2: (!) 16%   Weight: 101 lb 12.8 oz (46.2 kg)   Height: 5' 3.5\" (1.613 m)     Wt Readings from Last 3 Encounters:   04/12/17 101 lb 12.8 oz (46.2 kg)   03/18/15 102 lb 11.2 oz (46.6 kg)     Body mass index is 17.75 kg/(m^2).    PHYSICAL EXAM:  Constitutional:  Reveals thin proper white woman.  Nervous.  Vitals:  Per nursing notes.  Ears:  Clear.  Oropharynx:  Without posterior nasal drainage or thrush.  No jaundice  Neck:  Supple, thyroid not palpable.  Cardiac:  Regular rate and rhythm without murmurs, rubs, or gallops. Carotids without bruits. Legs without edema. Palpation of the radial pulse regular.  Lungs: Clear.  Respiratory effort normal.  Abdomen:   Bowel sounds positive, nontender, nondistended.  Neither liver nor spleen palpable.  No scar.  No hepatomegaly  Skin:   Without rash, bruise, or palpable lesions.  No obvious psoriasis  Rheumatologic: Normal joints and nails of the hands.  Neurologic:  Cranial nerves II-XII intact.     Psychiatric:  Mood appropriate, memory intact.     QUALITY MEASURES:  The following low BMI interventions were performed this visit: prescribed diet education and nutrition/feeding management    ADDITIONAL HISTORY SUMMARIZED (2): Reviewed discharge summary and cardiology consult 2015  DECISION TO OBTAIN EXTRA INFORMATION (1): None.   RADIOLOGY TESTS (1): Gallbladder ultrasound 2015 showing one stone  LABS (1): Ordered   MEDICINE TESTS (1): None.  INDEPENDENT REVIEW (2 each): None.     The visit lasted a total of 38 minutes face to face with the patient. Over 50% of the time was spent counseling and educating the patient about laparoscopic cholecystectomy and nature of coronary occlusion.    MEDICATIONS:  Current Outpatient Prescriptions   Medication Sig Dispense Refill     calcium carbonate-vitamin D2 500 " mg(1,250mg) -200 unit tablet Take 1 tablet by mouth 2 (two) times a day.       raloxifene (EVISTA) 60 mg tablet TAKE 1 TABLET BY MOUTH DAILY 90 tablet 0     zolpidem (AMBIEN) 5 MG tablet TAKE 1 TABLET BY MOUTH EVERY NIGHT AT BEDTIME AS NEEDED. 30 tablet 0     Current Facility-Administered Medications   Medication Dose Route Frequency Provider Last Rate Last Dose     denosumab 60 mg (PROLIA 60 mg/ml)  60 mg Subcutaneous Q6 Months Aries Tovar MD   60 mg at 02/20/17 0958       Total data points:4

## 2021-06-10 NOTE — TELEPHONE ENCOUNTER
Central PA team  [phone]  Pool: HE PA MED ([id number])          PA has been initiated.       PA form completed and faxed insurance via Cover My Meds     Key:  [id number]     Medication:  MEMANTINE    Insurance:  [employer]         Response will be received via fax and may take up to 5-10 business days depending on plan

## 2021-06-11 NOTE — TELEPHONE ENCOUNTER
Controlled Substance Refill Request  Medication Name:   Requested Prescriptions     Pending Prescriptions Disp Refills     eszopiclone (LUNESTA) 2 MG Tab [Pharmacy Med Name: ESZOPICLONE 2MG TABLETS] 30 tablet 0     Sig: TAKE ONE TABLET BY MOUTH AT BEDTIME. TAKE IMMEDIATELY BEFORE BEDTIME     Date Last Fill: 1/8/20  [facility]ed [facility]: [facility]  Submit electronically to pharmacy  Controlled Substance Agreement on file:   Encounter-Level CSA Scan Date:    There are no encounter-level csa scan date.        Last office visit:  6/29/20  Zully Melara RN, MA  [facility]    Triage Nurse Advisor

## 2021-06-11 NOTE — TELEPHONE ENCOUNTER
Medication: Lunesta  Last Date Filled 4/23/20   pulled: YES    Only PCP Prescribing? : YES  Taken as prescribed from physician notes? YES    CSA in last year: NO  Random Utox in last year: NO  (if any of the above answer NO - schedule with PCP)     Opioids + benzodiazepines? NO  (if the above answer YES - schedule with PCP every 6 months)     Is patient on the Executive Review Team? no      All responses suggest: Refilling prescription

## 2021-06-11 NOTE — PROGRESS NOTES
Patient would like to be contacted via the following phone number [phone]     ASSESSMENT:  1. Diarrhea, unspecified type  Strong tendency to collagenous colitis.  Tolerable but present diarrhea on memantine.  Due to marginal and only long-term benefits, recommend 1 to 2-week trial off memantine to determine culprit    2. Late onset Alzheimer's disease without behavioral disturbance (H)  Intolerant to stronger meds.  Question benefit of memantine.  Question trial of thyroid.  Intolerant to trial of stimulants    3. Primary insomnia  Clarify she only takes these rarely and as needed  - zaleplon (SONATA) 5 MG capsule; Take 1 capsule (5 mg total) by mouth at bedtime as needed.  Dispense: 30 capsule; Refill: 0  - eszopiclone (LUNESTA) 2 MG Tab; Take 1 tablet (2 mg total) by mouth at bedtime as needed. Take immediately before bedtime  Dispense: 30 tablet; Refill: 0    4. Chronic rhinitis  Drainage could be acid reflux or sinus.  Recommend gentle attempts at each  - fluticasone propionate (FLONASE) 50 mcg/actuation nasal spray; 1 puff each nostril at bed  Dispense: 16 g; Refill: 12    5. Fatigue, unspecified type  Question side effect of memantine.  Trial of low-dose thyroid  - levothyroxine (SYNTHROID, LEVOTHROID) 25 MCG tablet; Take 1 tablet (25 mcg total) by mouth daily.  Dispense: 30 tablet; Refill: 11    6. Gastroesophageal reflux disease with esophagitis  Trial of nonabsorbable sucralfate  - sucralfate (CARAFATE) 1 gram tablet; Take 1 tablet (1 g total) by mouth 2 (two) times a day.  Dispense: 60 tablet; Refill: 11    Preventive Health Care: Recommend flu shot    PLAN:  Patient Instructions   Discontinue memantine    Levothyroxine 25 mcg each morning    Fluticasone nasal spray each night    Sucralfate 1 g twice daily    Clarify that sleeping medicines are only as needed and rarely at that      Return if symptoms worsen or fail to improve, for a phone/video follow up visit.      CHIEF COMPLAINT:  Chief Complaint    Patient presents with     Diarrhea     Flu Symptoms     headache and nausea - chills, fatigue     Skin Cancer     Basal cell 9/21 - Moh's check by ears - Squamous Cell on leg tx TBD       HISTORY OF PRESENT ILLNESS:  Marley is a 80 y.o. female contacting the clinic today via phone for review of medication.  She was given memantine to try.  Daytime dose made her too sleepy.  She is now taking 10 mg at bedtime.  She thinks it is causing diarrhea but it is tolerable.  Neither she nor her  are certain there is any true memory improvement    She reports feeling cold all the time.  She otherwise has energy but does complain of frequent fatigue    She likes to have sleeping pills available but takes them very rarely and her  confirms this    She has recently complained of posterior nasal drainage.  She feels phlegm in the back of her throat.  She denies nasal purulence or asymmetry.  She does complain of acid reflux.  She has no previous history of seasonal allergies    She has been diagnosed with skin cancer on a variety of sites and is undergoing Mohs procedure    REVIEW OF SYSTEMS:   1 to 2 days of nausea.  Worsening fatigue this morning.  Intermittent diarrhea.  All other systems are negative.    PFSH:  Social History     Social History Narrative     to  Judson        Enjoys tennis and golf        3 boys     Continues to exercise daily    TOBACCO USE:  Social History     Tobacco Use   Smoking Status Former Smoker   Smokeless Tobacco Never Used       VITALS:  There were no vitals filed for this visit.  Wt Readings from Last 3 Encounters:   06/29/20 100 lb (45.4 kg)   04/30/19 102 lb 11.2 oz (46.6 kg)   04/24/19 104 lb 4 oz (47.3 kg)       PHYSICAL EXAM:  (observations via Phone)  Anxious, pushed speech, complaints of fatigue.  Still in bed      ADDITIONAL HISTORY SUMMARIZED (2): Reviewed previous phone notes regarding skin cancer and preop  CARE EVERYWHERE/ EXTRA INFORMATION (1):   No orders  "of the defined types were placed in this encounter.    RADIOLOGY TESTS (1):   LABS (1): Up-to-date  CARDIOLOGY/MEDICINE TESTS (1):   INDEPENDENT REVIEW (2 each):     Total data points: 3    Phone Start time: 10:58 AM  Phone End time: 11:38 AM    The visit lasted a total of 40 minutes     CA Intake Time: 3 min    I have reviewed and updated the patient's Past Medical History, Social History, Family History as appropriate.    MEDICATIONS: Reviewed and updated per CA and MD  Current Outpatient Medications   Medication Sig Dispense Refill     calcium carbonate-vitamin D2 500 mg(1,250mg) -200 unit tablet Take 1 tablet by mouth 2 (two) times a day.       cholecalciferol, vitamin D3, 2,000 unit Tab Take 1 tablet by mouth daily.       melatonin 5 mg Tab tablet Take 5 mg by mouth at bedtime as needed.       VIT C/VIT E AC/LUT/COPPER/ZINC (PRESERVISION LUTEIN ORAL) Take 2 tablets by mouth daily.             eszopiclone (LUNESTA) 2 MG Tab Take 1 tablet (2 mg total) by mouth at bedtime as needed. Take immediately before bedtime 30 tablet 0     fluticasone propionate (FLONASE) 50 mcg/actuation nasal spray 1 puff each nostril at bed 16 g 12     levothyroxine (SYNTHROID, LEVOTHROID) 25 MCG tablet Take 1 tablet (25 mcg total) by mouth daily. 30 tablet 11     sucralfate (CARAFATE) 1 gram tablet Take 1 tablet (1 g total) by mouth 2 (two) times a day. 60 tablet 11     zaleplon (SONATA) 5 MG capsule Take 1 capsule (5 mg total) by mouth at bedtime as needed. 30 capsule 0     Current Facility-Administered Medications   Medication Dose Route Frequency Provider Last Rate Last Dose     denosumab 60 mg (PROLIA 60 mg/ml)  60 mg Subcutaneous Once Aries Tovar MD           The patient has been notified of following:     \"This telephone visit will be conducted via a call between you and your physician/provider. We have found that certain health care needs can be provided without the need for a physical exam.  This service lets us provide " "the care you need with a short phone conversation.  If a prescription is necessary we can send it directly to your pharmacy.  If lab work is needed we can place an order for that and you can then stop by our lab to have the test done at a later time.    Telephone visits are billed at different rates depending on your insurance coverage. During this emergency period, for some insurers they may be billed the same as an in-person visit.  Please reach out to your insurance provider with any questions.    If during the course of the call the physician/provider feels a telephone visit is not appropriate, you will not be charged for this service.\"    Patient has given verbal consent to a Telephone visit? Yes    Patient would like to receive their AVS by AVS Preference: Mail a copy.    Aries Tovar MD     "

## 2021-06-11 NOTE — PATIENT INSTRUCTIONS - HE
Discontinue memantine    Levothyroxine 25 mcg each morning    Fluticasone nasal spray each night    Sucralfate 1 g twice daily    Clarify that sleeping medicines are only as needed and rarely at that

## 2021-06-12 NOTE — TELEPHONE ENCOUNTER
"Medication Request  Medication name:    Disp Refills Start End    eszopiclone (LUNESTA) 2 MG Tab 30 tablet 0 9/10/2020     Sig - Route: Take 1 tablet (2 mg total) by mouth at bedtime as needed. Take immediately before bedtime - Oral    Class: No Print      Requested Pharmacy: [id number]  Reason for request: Refill  When did you use medication last?:  Last Filled: 8/31/2020  Patient offered appointment:  N/A - electronic request  Okay to leave a detailed message: no    FYI: This medication is listed as \"No Print\" on patient's current medication list.  "

## 2021-06-12 NOTE — TELEPHONE ENCOUNTER
FYI - Status Update  Who is Calling: Patient  Update: Patient stated that she is only using eszopiclone (LUNESTA) 2 MG Tab as needed. Patient stated that she will keep this medication in hand incase she can't sleep at night and will only use it when needed. Patient stated she doesn't want Aries Tovar MD to think she is abusing this medication.  Okay to leave a detailed message?:  Yes

## 2021-06-13 NOTE — TELEPHONE ENCOUNTER
Medication Request  Medication name:    Disp Refills Start End    fluticasone propionate (FLONASE) 50 mcg/actuation nasal spray 48 g 3 10/6/2020     Sig: SPRAY ONCE IN EACH NOSTRIL AT BEDTIME    Sent to pharmacy as: fluticasone propionate 50 mcg/actuation nasal spray,suspension (FLONASE)    Notes to Pharmacy: **Patient requests 90 days supply**    E-Prescribing Status: Receipt confirmed by pharmacy (10/6/2020  4:57 PM CDT)      Requested Pharmacy: [id number]  Reason for request: New refills   When did you use medication last?:  today  Patient offered appointment:  n/a  Okay to leave a detailed message: yes  [phone]    FYI: Patient stated she would like an updated direction prescription to use 2 sprays each nostrils. Patient stated 2 sprays is very effective and one spray each nostrils is not as effective. Patient stated other medications prescribed were not effective at all.

## 2021-06-13 NOTE — PROGRESS NOTES
[facility]   Outpatient Follow-up evaluation.      Current Outpatient Prescriptions:      calcium carbonate-vitamin D2 500 mg(1,250mg) -200 unit tablet, Take 1 tablet by mouth 2 (two) times a day., Disp: , Rfl:      metoprolol succinate (TOPROL-XL) 25 MG, Take 1 tablet (25 mg total) by mouth daily., Disp: 30 tablet, Rfl: 11     nortriptyline (PAMELOR) 10 MG capsule, 10 mg., Disp: , Rfl:      OMEGA-3/DHA/EPA/FISH OIL (FISH OIL-OMEGA-3 FATTY ACIDS) 300-1,000 mg capsule, Take 2 g by mouth daily., Disp: , Rfl:      VIT C/VIT E AC/LUT/COPPER/ZINC (PRESERVISION LUTEIN ORAL), Take by mouth., Disp: , Rfl:      zolpidem (AMBIEN) 5 MG tablet, TAKE 1 TABLET BY MOUTH EVERY NIGHT AT BEDTIME AS NEEDED., Disp: 30 tablet, Rfl: 0     LORazepam (ATIVAN) 0.5 MG tablet, Take 1 tablet (0.5 mg total) by mouth every 8 (eight) hours as needed for anxiety., Disp: 20 tablet, Rfl: 0     raloxifene (EVISTA) 60 mg tablet, TAKE 1 TABLET BY MOUTH DAILY, Disp: 90 tablet, Rfl: 0        Marley Andrade is a 77 y.o. Female    Chief Complaint   Patient presents with     Palpitations       Diagnoses:  See Problem list     Recommendations:    For now she would like to cut her metoprolol dosing to once every other day and see how she does.  I am okay with that change update us if she has any recurring symptoms.  No specific follow-up made at this time call again if needed      Subjective:   Patient states she is feeling remarkably better she has minimal palpitations.  No chest pain or breathing abnormalities.  She had one episode at sound like a typical acute anxiety event.  She felt a sense of unease anxiety her heart racing shortness of breath with some tingling in her fingertips and toes.  Some and gave her bag that she breathe into and the symptom resolved.  She did also go to an ER for evaluation and no specific abnormalities were found.  Occasionally feels some lightheadedness only when standing never when seated or resting.  She  is aware of risk of her palpitations every once a while but markedly improved from before.  Her Holter monitor showed essentially ectopic atrial rhythm and frequent PACs as much as 3%.  No other complex arrhythmias seen.                    Past Medical History:   Diagnosis Date     Atrial flutter      Hx antineoplastic chemotherapy      Past Surgical History:   Procedure Laterality Date     HYSTERECTOMY       OOPHORECTOMY       MD TOTAL ABDOM HYSTERECTOMY      Description: Hysterectomy;  Recorded: 10/29/2008;     Allergies   Allergen Reactions     Hydrocodone-Acetaminophen Nausea And Vomiting     Morphine      Oxycodone Nausea And Vomiting     Topiramate      No family history on file.   Social History     Social History     Marital status:      Spouse name: N/A     Number of children: N/A     Years of education: N/A     Occupational History     Not on file.     Social History Main Topics     Smoking status: Former Smoker     Smokeless tobacco: Not on file     Alcohol use Not on file     Drug use: Not on file     Sexual activity: Not on file     Other Topics Concern     Not on file     Social History Narrative     Family history not pertinent to chief complaint or presenting problem    Current Outpatient Prescriptions:      calcium carbonate-vitamin D2 500 mg(1,250mg) -200 unit tablet, Take 1 tablet by mouth 2 (two) times a day., Disp: , Rfl:      metoprolol succinate (TOPROL-XL) 25 MG, Take 1 tablet (25 mg total) by mouth daily., Disp: 30 tablet, Rfl: 11     nortriptyline (PAMELOR) 10 MG capsule, 10 mg., Disp: , Rfl:      OMEGA-3/DHA/EPA/FISH OIL (FISH OIL-OMEGA-3 FATTY ACIDS) 300-1,000 mg capsule, Take 2 g by mouth daily., Disp: , Rfl:      VIT C/VIT E AC/LUT/COPPER/ZINC (PRESERVISION LUTEIN ORAL), Take by mouth., Disp: , Rfl:      zolpidem (AMBIEN) 5 MG tablet, TAKE 1 TABLET BY MOUTH EVERY NIGHT AT BEDTIME AS NEEDED., Disp: 30 tablet, Rfl: 0     LORazepam (ATIVAN) 0.5 MG tablet, Take 1 tablet (0.5 mg  "total) by mouth every 8 (eight) hours as needed for anxiety., Disp: 20 tablet, Rfl: 0     raloxifene (EVISTA) 60 mg tablet, TAKE 1 TABLET BY MOUTH DAILY, Disp: 90 tablet, Rfl: 0      Objective:   /64 (Patient Site: Left Arm, Patient Position: Sitting, Cuff Size: Adult Regular)  Pulse 65  Ht 5' 3.5\" (1.613 m)  Wt 103 lb 6.4 oz (46.9 kg)  BMI 18.03 kg/m2  103 lb 6.4 oz (46.9 kg)   Wt Readings from Last 3 Encounters:   09/29/17 103 lb 6.4 oz (46.9 kg)   06/09/17 100 lb (45.4 kg)   05/18/17 101 lb (45.8 kg)     BP Readings from Last 3 Encounters:   09/29/17 102/64   06/09/17 116/59   05/18/17 100/60     Pulse Readings from Last 3 Encounters:   09/29/17 65   06/09/17 (!) 58   05/18/17 76     General appearance: alert, appears stated age and cooperative  Head: Normocephalic, without obvious abnormality, atraumatic  Eyes: Normal external exam without jaundice.  Ears: Normal external auricular exam.  Nose: Normal external exam.  Lungs: clear to auscultation bilaterally  Chest wall: no tenderness  Heart: regular rate and rhythm, S1, S2 normal, no murmur, click, rub or gallop prolonged auscultation and palpation found only irregular rhythm no changes in rhythm were noted  Pulses: 2+ and symmetric  Skin: Skin color, texture, turgor normal.   Neurologic: Grossly normal, no focal neurologic findings.    Review of Systems:   General: WNL  Cardiographics: Reviewed in clinic.    Lab Results:  Lab Results: Personally reviewed  No visits with results within 2 Month(s) from this visit.  Latest known visit with results is:    Admission on 06/09/2017, Discharged on 06/09/2017   Component Date Value     VENTRICULAR RATE 06/09/2017 66      ATRIAL RATE 06/09/2017 66      P-R INTERVAL 06/09/2017 166      QRS DURATION 06/09/2017 94      Q-T INTERVAL 06/09/2017 408      QTC CALCULATION (BEZET) 06/09/2017 427      P Axis 06/09/2017 65      R AXIS 06/09/2017 33      T AXIS 06/09/2017 62      MUSE DIAGNOSIS 06/09/2017                   "    Value:Normal sinus rhythm  Low voltage QRS  Abnormal ECG  When compared with ECG of 18-MAY-2017 14:02,  No significant change was found  Confirmed by KARLA JAMIL MD LOC: ([id number]) on 6/10/2017 3:46:59 PM       Sodium 06/09/2017 138      Potassium 06/09/2017 3.5      Chloride 06/09/2017 103      CO2 06/09/2017 18*     Anion Gap, Calculation 06/09/2017 17      Glucose 06/09/2017 98      Calcium 06/09/2017 9.6      BUN 06/09/2017 18      Creatinine 06/09/2017 0.83      GFR MDRD Af Amer 06/09/2017 >60      GFR MDRD Non Af Amer 06/09/2017 >60      Magnesium 06/09/2017 2.1      WBC 06/09/2017 8.3      RBC 06/09/2017 4.58      Hemoglobin 06/09/2017 14.0      Hematocrit 06/09/2017 41.1      MCV 06/09/2017 90      MCH 06/09/2017 30.6      MCHC 06/09/2017 34.1      RDW 06/09/2017 12.8      Platelets 06/09/2017 263      MPV 06/09/2017 11.7        Clinical evaluation time today including exam 25 minutes.  At least 50% of clinic evaluation time involved in assessment and patient counseling.  Part of this chart was created using a dictation software.  Typographic errors, word substitutions, and grammatical errors may unintentionally occur.    Aman Hdez M.D.  [facility]

## 2021-06-14 NOTE — TELEPHONE ENCOUNTER
Last Appt: 9/10/20  Next Appt:  no future appointments scheduled  90 day supply pended    Requested Prescriptions     Pending Prescriptions Disp Refills     eszopiclone (LUNESTA) 2 MG Tab 30 tablet 0     Sig: Take 1 tablet (2 mg total) by mouth at bedtime as needed. Take immediately before bedtime     sucralfate (CARAFATE) 1 gram tablet 180 tablet 3     Sig: TAKE 1 TABLET(1 GRAM) BY MOUTH TWICE DAILY

## 2021-06-15 PROBLEM — F41.9 ANXIETY: Status: ACTIVE | Noted: 2017-05-12

## 2021-06-16 PROBLEM — K21.00 GASTROESOPHAGEAL REFLUX DISEASE WITH ESOPHAGITIS: Status: ACTIVE | Noted: 2020-09-10

## 2021-06-16 PROBLEM — Z87.2 HISTORY OF PSORIASIS: Status: ACTIVE | Noted: 2020-06-02

## 2021-06-16 PROBLEM — I47.19 ECTOPIC ATRIAL TACHYCARDIA (H): Status: ACTIVE | Noted: 2017-09-29

## 2021-06-16 PROBLEM — G30.1 LATE ONSET ALZHEIMER'S DISEASE WITHOUT BEHAVIORAL DISTURBANCE (H): Status: ACTIVE | Noted: 2020-04-28

## 2021-06-16 PROBLEM — F02.80 LATE ONSET ALZHEIMER'S DISEASE WITHOUT BEHAVIORAL DISTURBANCE (H): Status: ACTIVE | Noted: 2020-04-28

## 2021-06-16 PROBLEM — H02.403: Status: ACTIVE | Noted: 2020-06-29

## 2021-06-16 NOTE — PATIENT INSTRUCTIONS - HE
Medicine list clarified    Florinef 0.1 mg daily-10 tablet trial    Update labs    Referral to vestibular rehab for repeat trial    Patch monitor

## 2021-06-16 NOTE — PROGRESS NOTES
Marley is a 80 y.o. female contacting the clinic today via video, who will use the platform: [employer] for the visit.  Cell # for Philip [phone]     ASSESSMENT:  1. Vertigo  Components of both hypoperfusion and inner ear vertigo.  Discussed treatable versus nontreatable causes.  Update blood test.  Repeat cardiac monitor.  Repeat referral back to therapy to check and repeat exercises.  Trial of low-dose fludrocortisone  - HM2(CBC w/o Differential); Future  - Vitamin B12; Future  - Thyroid Stimulating Hormone (TSH); Future  - REILLY Monitor Hook-Up; Future  - Ambulatory referral to PT/OT  - fludrocortisone (FLORINEF) 0.1 mg tablet; Take 1 tablet (0.1 mg total) by mouth daily.  Dispense: 10 tablet; Refill: 0    2. Late onset Alzheimer's disease without behavioral disturbance (H)  Continues to slowly worsen.  Unable to tolerate medication  - Comprehensive Metabolic Panel; Future  - Thyroid Stimulating Hormone (TSH); Future    3. Dissection, iliac artery (H)  5 years ago documented by CT scan.  No evidence of recurrence and no persisting pain    4. Ectopic atrial tachycardia (H)  I previous cardiac evaluation.  This increases likelihood of atrial arrhythmia  - REILLY Monitor Hook-Up; Future    5. Gastroesophageal reflux disease with esophagitis without hemorrhage  Stable on sucralfate.  CT scan 2016 suggested gastritis    Preventive Health Care:      PLAN:  Patient Instructions   Medicine list clarified    Florinef 0.1 mg daily-10 tablet trial    Update labs    Referral to vestibular rehab for repeat trial    Patch monitor    Return in about 4 months (around 7/29/2021) for a phone/video follow up visit.      CHIEF COMPLAINT:  Chief Complaint   Patient presents with     Follow-up       HISTORY OF PRESENT ILLNESS:  Marley is a 80 y.o. female contacting the clinic today via video for complaints of vertigo.  She describes components of both balance, motion, and hypoperfusion.  She feels sometimes like she might faint.  There are  "times she feels like she is walking on the deck of a ship.  She underwent vestibular rehab 2 to 3 years ago and practices those exercises frequently.  She has not checked heart rate and blood pressure with these episodes but  reports that most readings have been \"excellent\"    She has slowly progressing memory.  Trial of Aricept and Namenda have caused unacceptable side effects    Diarrhea has remained stable off acid suppressing medication.  Sucralfate has helped nicely.  CT scan of the abdomen 2016 showed some findings of gastritis.  She also had an iliac artery dissection    REVIEW OF SYSTEMS:   Chronic constipation    PFSH:  Social History     Social History Narrative     to  Judson        Enjoys tennis and golf        3 boys     Social Determinants:  Exercises and swims    TOBACCO USE:  Social History     Tobacco Use   Smoking Status Former Smoker   Smokeless Tobacco Never Used       VITALS:  There were no vitals filed for this visit.  Wt Readings from Last 3 Encounters:   06/29/20 100 lb (45.4 kg)   04/30/19 102 lb 11.2 oz (46.6 kg)   04/24/19 104 lb 4 oz (47.3 kg)       PHYSICAL EXAM:  (observations via Video)  Defers to her .  Slightly anxious    MEDICATIONS:   Current Outpatient Medications   Medication Sig Dispense Refill     calcium carbonate-vitamin D2 500 mg(1,250mg) -200 unit tablet Take 1 tablet by mouth 2 (two) times a day.       cholecalciferol, vitamin D3, 2,000 unit Tab Take 1 tablet by mouth daily.       eszopiclone (LUNESTA) 2 MG Tab Take 1 tablet (2 mg total) by mouth at bedtime as needed. Take immediately before bedtime 90 tablet 0     fluticasone propionate (FLONASE) 50 mcg/actuation nasal spray 2 sprays into each nostril daily. 48 g 3     melatonin 5 mg Tab tablet Take 5 mg by mouth at bedtime as needed.       sucralfate (CARAFATE) 1 gram tablet TAKE 1 TABLET(1 GRAM) BY MOUTH TWICE DAILY 180 tablet 3     zaleplon (SONATA) 5 MG capsule Take 1 capsule (5 mg total) " by mouth at bedtime as needed. 30 capsule 0     fludrocortisone (FLORINEF) 0.1 mg tablet Take 1 tablet (0.1 mg total) by mouth daily. 10 tablet 0     No current facility-administered medications for this visit.          Notes summarized:   Labs, x-rays, cardiology, GI tests reviewed: CT scan 2016  New orders:   Orders Placed This Encounter   Procedures     Comprehensive Metabolic Panel     Standing Status:   Future     Standing Expiration Date:   3/29/2022     HM2(CBC w/o Differential)     Standing Status:   Future     Standing Expiration Date:   3/29/2022     Vitamin B12     Standing Status:   Future     Standing Expiration Date:   3/29/2022     Thyroid Stimulating Hormone (TSH)     Standing Status:   Future     Standing Expiration Date:   3/29/2022     Ambulatory referral to PT/OT     Referral Priority:   Routine     Referral Type:   Physical Therapy or Occupational Therapy     Referral Reason:   Evaluation and Treatment     Requested Specialty:   Physical Therapy     Number of Visits Requested:   1       Independent review of:    Supplemental history by:     Video Start Time: 10:05 AM  Video End time:  10:37 AM  Face to face plus orders: 32 minutes  Documentation time: 5 minutes    The visit lasted a total of 37 minutes       Patient has given verbal consent to a Video visit?  Yes  How would you like to obtain your AVS?  MyChart  Will anyone else be joining your video visit?  - Judson       Video-Visit Details    Type of service:  Video Visit  Originating Location (pt. Location): Home  Distant Location (provider location):   [facility]       Aries Tovar MD

## 2021-06-16 NOTE — TELEPHONE ENCOUNTER
RN cannot approve Refill Request    RN can NOT refill this medication med is not covered by policy/route to provider. Last office visit: 6/4/2018 Aries Tovar MD Last Physical: 4/24/2019 Last MTM visit: Visit date not found Last visit same specialty: 6/4/2018 Aries Tovar MD.  Next visit within 3 mo: Visit date not found  Next physical within 3 mo: Visit date not found      Victoria Hong, [facility] Triage/Med Refill 4/13/2021    Requested Prescriptions   Pending Prescriptions Disp Refills     midodrine (PROAMATINE) 2.5 MG tablet [Pharmacy Med Name: MIDODRINE 2.5MG TABLETS] 180 tablet 0     Sig: TAKE 1 TABLET(2.5 MG) BY MOUTH TWICE DAILY WITH MEALS       There is no refill protocol information for this order

## 2021-06-16 NOTE — PATIENT INSTRUCTIONS - HE
Finish midodrine tomorrow for 10-day course    If dizziness recurs after stopping, refill midodrine    Discontinue Florinef-done    Teaspoonfuls as you    Cardiac monitor    Continue sucralfate once or twice daily

## 2021-06-16 NOTE — PROGRESS NOTES
"Marley is a 80 y.o. female contacting the clinic today via video, who will use the platform: [employer] for the visit.  Phone # for [employer], or if Amwell drops:   Telephone Information:   Mobile [phone]   Mobile [phone]        ASSESSMENT:  1. Orthostatic hypotension  Reviewed my chart messages.  No improvement on Florinef.  10-day course of midodrine completed tomorrow.  Symptoms completely resolved however she is certain that this medication is now doing.  Strongly suspicious that it did.  Will allow her to stop.  Resume within a few days if symptoms recur as predicted.  Await cardiac monitor  - midodrine (PROAMATINE) 2.5 MG tablet; Take 1 tablet (2.5 mg total) by mouth 2 (two) times a day with meals.  Dispense: 60 tablet; Refill: 11    2. Vertigo  Improved on midodrine argues against middle ear    3. Late onset Alzheimer's disease without behavioral disturbance (H)  Chaotic history makes diagnosis and treatment both difficult    4. Gastroesophageal reflux disease with esophagitis without hemorrhage  Sucralfate markedly improved \"phlegm\".  Suggests acid reflux    5. Primary insomnia  Takes sleeping pills sparingly    Preventive Health Care:      PLAN:  Patient Instructions   Finish midodrine tomorrow for 10-day course    If dizziness recurs after stopping, refill midodrine    Discontinue Florinef-done    Teaspoonfuls as you    Cardiac monitor    Continue sucralfate once or twice daily    Return in about 2 months (around 6/13/2021) for a phone/video follow up visit.      CHIEF COMPLAINT:  Chief Complaint   Patient presents with     Medication Question Or Clarification     pt has been taking the lunesta, she states that she will take this when she wakes up in the middle of the night and can't fall back asleep       HISTORY OF PRESENT ILLNESS:  Marley is a 80 y.o. female contacting the clinic today via video for review of medication.  When we talked last month she was complaining of dizziness and we discussed " cardiac hypotension.  Event monitor has now been placed.  She was given Florinef for hypotension.  A 10-day course did nothing.  Blood pressure oftentimes is below 100 but does not directly correlate with symptoms.  She was provided midodrine 2.5 mg twice daily on April 5.  She has taken this the last 9 days, reports virtually complete resolution of the vertigo and dizziness, but does not think the medication is helping.  She should stop it tomorrow and see how she does    Initiation of sucralfate in September markedly improved her symptoms of phlegm and posterior nasal drainage.  She confuses her pills and makes history difficult    She takes sleeping pills rarely    REVIEW OF SYSTEMS:   No sinus drainage blowing out the nose    PFSH:  Social History     Social History Narrative     to  Judson        Enjoys tennis and golf        3 boys     Social Determinants:      TOBACCO USE:  Social History     Tobacco Use   Smoking Status Former Smoker   Smokeless Tobacco Never Used       VITALS:  There were no vitals filed for this visit.  Wt Readings from Last 3 Encounters:   06/29/20 100 lb (45.4 kg)   04/30/19 102 lb 11.2 oz (46.6 kg)   04/24/19 104 lb 4 oz (47.3 kg)       PHYSICAL EXAM:  (observations via Video)  Frequently confused but alert and pleasant    MEDICATIONS:   Current Outpatient Medications   Medication Sig Dispense Refill     eszopiclone (LUNESTA) 2 MG Tab Take 1 tablet (2 mg total) by mouth at bedtime as needed. Take immediately before bedtime 90 tablet 0     fluticasone propionate (FLONASE) 50 mcg/actuation nasal spray 2 sprays into each nostril daily. 48 g 3     sucralfate (CARAFATE) 1 gram tablet TAKE 1 TABLET(1 GRAM) BY MOUTH TWICE DAILY 180 tablet 3     calcium carbonate-vitamin D2 500 mg(1,250mg) -200 unit tablet Take 1 tablet by mouth 2 (two) times a day.       cholecalciferol, vitamin D3, 2,000 unit Tab Take 1 tablet by mouth daily.       melatonin 5 mg Tab tablet Take 5 mg by mouth at  bedtime as needed.       midodrine (PROAMATINE) 2.5 MG tablet Take 1 tablet (2.5 mg total) by mouth 2 (two) times a day with meals. 60 tablet 11     zaleplon (SONATA) 5 MG capsule Take 1 capsule (5 mg total) by mouth at bedtime as needed. 30 capsule 0     No current facility-administered medications for this visit.          Notes summarized: Reviewed emails with blood pressure chart  Labs, x-rays, cardiology, GI tests reviewed:   New orders: No orders of the defined types were placed in this encounter.      Independent review of:    Supplemental history by: Pam Roper    Video Start Time: 407 PM  Video End time:  454 PM  Face to face plus orders: 47 minutes  Documentation time: 6 minutes    The visit lasted a total of 53 minutes       Patient has given verbal consent to a Video visit?  Yes  How would you like to obtain your AVS?  MyChart  Will anyone else be joining your video visit?   Judson     Video-Visit Details    Type of service:  Video Visit  Originating Location (pt. Location): Home  Distant Location (provider location):   [facility]       Aries Tovar MD

## 2021-06-17 NOTE — PROGRESS NOTES
Marley is a 80 y.o. female contacting the clinic today via video, who will use the platform: [employer] for the visit.  Phone # for [employer], or if Amwell drops:   Telephone Information:   Mobile [phone]   Mobile [phone]        ASSESSMENT:  1. Chronic rhinitis  Symptoms of phlegm production could be sinus or reflux equivalent with improvement on sucralfate.  With coexisting vertigo, ask ENT to help sort out  - Ambulatory referral to ENT    2. Gastroesophageal reflux disease with esophagitis without hemorrhage  Although no definitive history of esophagitis, CT scan a few years ago did show gastritis.  Continue sucralfate and add famotidine  - famotidine (PEPCID) 20 MG tablet; Take 1 tablet (20 mg total) by mouth at bedtime.  Dispense: 30 tablet; Refill: 11    3. Vertigo  Coexisting with orthostatic type symptoms.  Has already undergone vestibular rehab.  ENT referral  - Ambulatory referral to ENT    4. Orthostatic hypotension  Blood pressures reviewed from 4 weeks ago.  Suspect still orthostatic component.  Continue midodrine and adjust to systolic blood pressure greater than 120    5. Late onset Alzheimer's disease without behavioral disturbance (H)  Complicates diagnosis with erratic history    6.  Poor hearing.  Optimize hearing.  Referral back to audiology    Preventive Health Care:      PLAN:  Patient Instructions   Continue midodrine 2.5 mg twice daily    Add famotidine 20 mg at bed    Continue sucralfate    ENT referral for phlegm and vertigo    Holter monitor reviewed    Adjust midodrine to blood pressure 120-140 systolic    Return in about 3 weeks (around 5/17/2021) for a phone/video follow up visit.      CHIEF COMPLAINT:  Chief Complaint   Patient presents with     Follow-up     Test results      Dizziness     Nasal Congestion       HISTORY OF PRESENT ILLNESS:  Marley is a 80 y.o. female contacting the clinic today via video for review of medication.  She continues to complain of exuberant phlegm which  has been helped by sucralfate.  This suggests a diagnosis of reflux and globus.  Although she reports no history of gastritis, a CT scan from [facility] 4 years ago shows gastritis of the stomach.  She has never had endoscopy.  She does complain of occasional acid reflux    She has coexisting vertigo with some dullness of movement.    She also complains of lightheadedness.  Some of these have been correlated with blood pressures below 100.  Florinef was ineffective.  Midodrine seems to have helped slightly but she has not monitored her blood pressure.  Cardiac monitor showed some runs of ectopy and SVT not associated with symptoms.  No atrial fibrillation    REVIEW OF SYSTEMS:   No peripheral edema.  No diarrhea    PFSH:  Social History     Social History Narrative     to  Jusdon        Enjoys tennis and golf        3 boys       TOBACCO USE:  Social History     Tobacco Use   Smoking Status Former Smoker   Smokeless Tobacco Never Used       VITALS:  There were no vitals filed for this visit.  Wt Readings from Last 3 Encounters:   06/29/20 100 lb (45.4 kg)   04/30/19 102 lb 11.2 oz (46.6 kg)   04/24/19 104 lb 4 oz (47.3 kg)       PHYSICAL EXAM:  (observations via Video)  Hard of hearing.   assisting.  History sometimes tangential    MEDICATIONS:   Current Outpatient Medications   Medication Sig Dispense Refill     calcium carbonate-vitamin D2 500 mg(1,250mg) -200 unit tablet Take 1 tablet by mouth 2 (two) times a day.       cholecalciferol, vitamin D3, 2,000 unit Tab Take 1 tablet by mouth daily.       eszopiclone (LUNESTA) 2 MG Tab Take 1 tablet (2 mg total) by mouth at bedtime as needed. Take immediately before bedtime 90 tablet 0     fluticasone propionate (FLONASE) 50 mcg/actuation nasal spray 2 sprays into each nostril daily. 48 g 3     melatonin 5 mg Tab tablet Take 5 mg by mouth at bedtime as needed.       midodrine (PROAMATINE) 2.5 MG tablet TAKE 1 TABLET(2.5 MG) BY MOUTH TWICE DAILY  WITH MEALS 180 tablet 0     sucralfate (CARAFATE) 1 gram tablet TAKE 1 TABLET(1 GRAM) BY MOUTH TWICE DAILY 180 tablet 3     zaleplon (SONATA) 5 MG capsule Take 1 capsule (5 mg total) by mouth at bedtime as needed. 30 capsule 0     famotidine (PEPCID) 20 MG tablet Take 1 tablet (20 mg total) by mouth at bedtime. 30 tablet 11     No current facility-administered medications for this visit.          Notes summarized:   Labs, x-rays, cardiology, GI tests reviewed: Event monitor last week.  New orders:   Orders Placed This Encounter   Procedures     Ambulatory referral to ENT     Referral Priority:   Routine     Referral Type:   Consultation     Referral Reason:   Evaluation and Treatment     Requested Specialty:   Otolaryngology     Number of Visits Requested:   1       Independent review of:    Supplemental history by: Pam Roper    Video Start Time: 9:45 AM  Video End time:  10:27 AM  Face to face plus orders: 42 minutes  Documentation time: 3 minutes    The visit lasted a total of 45 minutes     Patient has given verbal consent to a Video visit?  Yes  How would you like to obtain your AVS?  MyChart  Will anyone else be joining your video visit? No       Video-Visit Details  Type of service:  Video Visit  Originating Location (pt. Location): Home  Distant Location (provider location):   [facility]       Aries Tovar MD

## 2021-06-17 NOTE — PATIENT INSTRUCTIONS - HE
Trial of astelin nasal spray  Stop fluticasone  Start protonix as directed      Lifestyle changes:    Avoid eating 2-3 hours before bedtime.   You may find it helpful to elevate the head of your bed.     Avoid following foods that are likely to trigger acid reflux:    Coffee or tea (try LOW ACID coffee or herbal tea)  Anything that s fizzy or has caffeine in it  Alcohol   Citrus fruits, such as oranges and ede  Tomato based foods (salsa, pizza, lasanga)  Chocolate   Mint or peppermint  Fatty foods (ice cream)  Spicy foods  Onions and garlic

## 2021-06-17 NOTE — TELEPHONE ENCOUNTER
Please have Dr. Tovar place order/referral to Audiology. Pt has Medicare and is required. Thank you

## 2021-06-17 NOTE — PATIENT INSTRUCTIONS - HE
Continue midodrine 2.5 mg twice daily    Add famotidine 20 mg at bed    Continue sucralfate    ENT referral for phlegm and vertigo    Holter monitor reviewed    Adjust midodrine to blood pressure 120-140 systolic   Diabetes

## 2021-06-17 NOTE — PROGRESS NOTES
"CHIEF COMPLAINT:   Nose Concern        HISTORY OF PRESENT ILLNESS    Marley Andrade   was seen at the behest of Bromandie, Aries ELAM MD  for Chronic rhinitis  She complains of \"phegmn in the throat for the past several months.  She has recently started on pepcid two times a day but it hasn't helped much.   She feels like \"she has a cold all the time\".   She is using a nasal spray which takes care of the phlegmn at least temporarily.  No vertigo but some lightheadness/off balance in the morning.  The fluticasone seems to \"help get the mucus down\".   \"it is my best friend\".        ASSESSMENT:  1. Chronic rhinitis  Symptoms of phlegm production could be sinus or reflux equivalent with improvement on sucralfate.    With coexisting vertigo, ask ENT to help sort out  - Ambulatory referral to ENT     2. Gastroesophageal reflux disease with esophagitis without hemorrhage  Although no definitive history of esophagitis, CT scan a few years ago did show gastritis.    Continue sucralfate and add famotidine- famotidine (PEPCID) 20 MG tablet; Take 1 tablet (20 mg total) by mouth at bedtime.    Dispense: 30 tablet; Refill: 11     3. Vertigo  Coexisting with orthostatic type symptoms.  Has already undergone vestibular rehab.  ENT referral  - Ambulatory referral to ENT       REVIEW OF SYSTEMS    Review of Systems: a 10-system review is reviewed at this encounter.  See scanned document.         PHYSICAL EXAM:        HEAD: Normal appearance and symmetry:  No cutaneous lesions.      EARS:    Normal TM's bilaterally. Normal auditory canals and external ears. Non-tender.         NOSE:    Dorsum:   straight  Septum: normal  Mucosa:  moist  Inferior turbinates:  normal       ORAL CAVITY/OROPHARYNX:    Lips:  Normal.  Tongue: normal, midline  Mucosa:   no lesions  Tonsils:  1+      NECK:  Trachea:  midline.   Thyroid:  normal   Adenopathy:  none       NEURO:   Alert and Oriented       RESPIRATORY:   Symmetry and Respiratory effort    PSYCH: "   normal mood and affect    SKIN:  warm and dry     Audiogram:  downsloping SNHL with preserved WR.     IMPRESSION:    Encounter Diagnoses   Name Primary?     Chronic rhinitis Yes     Dizziness      LPRD (laryngopharyngeal reflux disease)        RECOMMENDATIONS:    Trial of astelin nasal spray  Stop fluticasone  Start protonix as directed

## 2021-06-17 NOTE — TELEPHONE ENCOUNTER
Telephone Encounter by Chen Frank at 4/23/2020  2:51 PM     Author: Chen Frank Service: -- Author Type: --    Filed: 4/23/2020  2:51 PM Encounter Date: 4/23/2020 Status: Signed    : Chen Frank APPROVED:    Approval start date: 3/24/2020  Approval end date:  4/23/2021    Pharmacy has been notified of approval and will contact patient when medication is ready for pickup.

## 2021-06-17 NOTE — TELEPHONE ENCOUNTER
Telephone Encounter by Chen Frank at 8/3/2020  3:40 PM     Author: Chen Frank Service: -- Author Type: --    Filed: 8/3/2020  3:40 PM Encounter Date: 7/31/2020 Status: Signed    : Chen Frank APPROVED:    Approval start date: 7/4/2020  Approval end date:  8/3/2021    Pharmacy has been notified of approval and will contact patient when medication is ready for pickup.

## 2021-06-17 NOTE — TELEPHONE ENCOUNTER
Telephone Encounter by Elisha Amador CMA at 8/31/2020 11:48 AM     Author: Elisha Amador CMA Service: -- Author Type: Certified Medical Assistant    Filed: 8/31/2020 11:50 AM Encounter Date: 8/26/2020 Status: Signed    : Elisha Amador CMA (Certified Medical Assistant)

## 2021-06-17 NOTE — TELEPHONE ENCOUNTER
Telephone Encounter by Elizabeth Smith LPN at 10/22/2020 11:37 AM     Author: Elizabeth Smith LPN Service: -- Author Type: Licensed Nurse    Filed: 10/22/2020 11:39 AM Encounter Date: 10/22/2020 Status: Signed    : Elizabeth Smith LPN (Licensed Nurse)       Medication: Lunesta  Last Date Filled 8/31/20   pulled: YES         Only PCP Prescribing? : YES  Taken as prescribed from physician notes? YES    CSA in last year: NO  Random Utox in last year: NO  (if any of the above answer NO - schedule with PCP)     Opioids + benzodiazepines? NO  (if the above answer YES - schedule with PCP every 6 months)     Is patient on the Executive Review Team? no      All responses suggest: Refilling prescription

## 2021-06-18 NOTE — PROGRESS NOTES
ASSESSMENT:  1. Memory changes  Consider typical Alzheimer's dementia.  Consider metabolic derangement.  No evidence of stroke.  Timeframe fits with initiation of metoprolol 1 year ago.  She is very sensitive to medication and could be having side effect or hypoperfusion  - Comprehensive Metabolic Panel  - HM2(CBC w/o Differential)  - Vitamin B12  - Thyroid Stimulating Hormone (TSH)  - Erythrocyte Sedimentation Rate    2. Palpitation  Reviewed cardiology note, Holter monitor, and initiation of metoprolol.  Trial of low-dose diltiazem  - diltiazem (CARDIZEM CD) 120 MG 24 hr capsule; Take 1 capsule (120 mg total) by mouth daily.  Dispense: 30 capsule; Refill: 11    3. Age-related osteoporosis without current pathological fracture  Optimize vitamin D  - Vitamin D, Total (25-Hydroxy)      PLAN:  Patient Instructions   First step is basic blood work for memory    Second is to eliminate any medication that can worsen memory, and to eliminate alcohol    No sleeping pills of any kind.  No soma, no ambien    No benadryl, no diphenhydramine, no PM meds (which contain benadryl)    Stop Nortriptyline    Stop Soma    Use Ambien as you were, going to [place] and for a few nights, rarely    The Metoprolol was added one year ago, and your memory issues began one year ago    Stop Metoprolol completely, as it might be interfering with your circulation    Replace Metoprolol with Diltiazem 120 mgs once a day, take at bedtime          Orders Placed This Encounter   Procedures     Comprehensive Metabolic Panel     HM2(CBC w/o Differential)     Vitamin D, Total (25-Hydroxy)     Vitamin B12     Thyroid Stimulating Hormone (TSH)     Erythrocyte Sedimentation Rate     Medications Discontinued During This Encounter   Medication Reason     metoprolol succinate (TOPROL-XL) 25 MG Duplicate order     nortriptyline (PAMELOR) 10 MG capsule      zaleplon (SONATA) 5 MG capsule      metoprolol succinate (TOPROL-XL) 25 MG Side effects      zolpidem (AMBIEN) 5 MG tablet Reorder       No Follow-up on file.    CHIEF COMPLAINT:  Chief Complaint   Patient presents with     Memory Loss     pt states having some memory issues the past 9 months      Dizziness     complains of dizziness        HISTORY OF PRESENT ILLNESS:  Marley is a 77 y.o. female presenting to the clinic today for memory concerns. She says she has noticed memory issues for the past 9 months, which is frightening to her. She asked her  prior to the visit, how long he felt this was an issue and he said 6 months. No one else has mentioned it to her. She says she is aware that she is hidng the problem due to embarrassment.. She is noticing that she cannot remember directions and gets lost easily. She says she has forgotten names for a long time, but she is also forgetting dates and occasionally cannot remember what month she is in. She plays bridge and says her game is not affected. MMSE today is 29/30.     Sleep Issues: She is not currently taking any prescription medications for sleep. She has taken Ambien 2.5mg for sleep in the past. She has taken Advil PM on occasion. She reports that she has difficulty with the first night of sleep during travel to [place].     Heart Palpitations: A 24-hour Holter monitor of 18 was abnormal by virtue of increased atrial ectopy. She was felt to be at increased risk of developing sustained atrial dysrhythmia. She was prescribed metoprolol 25mg daily. She reports that since starting the medication, she has not had any irregular heartbeat episodes.     REVIEW OF SYSTEMS:   She continues on Evista, which she takes for bone density.   All other systems are negative.    PFSH:   with Parkinson's. Grandmother  of pernicious anemia.     TOBACCO USE:  History   Smoking Status     Former Smoker   Smokeless Tobacco     Never Used       VITALS:  Vitals:    18 1428   BP: 118/68   Patient Site: Left Arm   Patient Position: Sitting   Cuff Size:  Adult Regular   Pulse: (!) 58   Weight: 103 lb 8 oz (46.9 kg)     Wt Readings from Last 3 Encounters:   06/04/18 103 lb 8 oz (46.9 kg)   09/29/17 103 lb 6.4 oz (46.9 kg)   06/09/17 100 lb (45.4 kg)     Body mass index is 18.05 kg/(m^2).    MEDICATIONS:  Current Outpatient Prescriptions   Medication Sig Dispense Refill     calcium carbonate-vitamin D2 500 mg(1,250mg) -200 unit tablet Take 1 tablet by mouth 2 (two) times a day.       OMEGA-3/DHA/EPA/FISH OIL (FISH OIL-OMEGA-3 FATTY ACIDS) 300-1,000 mg capsule Take 2 g by mouth daily.       raloxifene (EVISTA) 60 mg tablet Take 1 tablet (60 mg total) by mouth daily. 90 tablet 1     VIT C/VIT E AC/LUT/COPPER/ZINC (PRESERVISION LUTEIN ORAL) Take by mouth.       diltiazem (CARDIZEM CD) 120 MG 24 hr capsule Take 1 capsule (120 mg total) by mouth daily. 30 capsule 11     zolpidem (AMBIEN) 5 MG tablet TAKE 1 TABLET BY MOUTH EVERY NIGHT AT BEDTIME AS NEEDED. 10 tablet 0     No current facility-administered medications for this visit.        PHYSICAL EXAM:  Constitutional:  Reveals an alert, pleasant, talkative elderly woman. Not in any distress.  Vitals:  Per nursing notes.  Neurologic:  Cranial nerves II-XII intact.     Psychiatric:  Mood appropriate, memory intact.   MMSE: 29    ADDITIONAL HISTORY SUMMARIZED (2): Reviewed note of Dr. Hdez, 9/29/17 regarding heart palpitations.  DECISION TO OBTAIN EXTRA INFORMATION (1): None.   RADIOLOGY TESTS (1): None.  LABS (1): None.  MEDICINE TESTS (1): Reviewed Holter monitor results of 5/22/17.  INDEPENDENT REVIEW (2 each): None.     The visit lasted a total of 28 minutes face to face with the patient. Over 50% of the time was spent counseling and educating the patient about memory concerns.    Yared RICCI, am scribing for and in the presence of, Dr. Tovar.    IDr Tovar, personally performed the services described in this documentation, as scribed by Yared Kasper in my presence, and it is both accurate and  complete.    Total data points: 3

## 2021-06-19 NOTE — LETTER
Letter by Aries Tovar MD at      Author: Aries Tovar MD Service: -- Author Type: --    Filed:  Encounter Date: 4/30/2019 Status: (Other)         Marley Andrade  [address]  [address]  [place] [address]             April 30, 2019         Dear Ms. Andrade,    Below are the results from your recent visit:    Resulted Orders   Basic Metabolic Panel   Result Value Ref Range    Sodium 137 136 - 145 mmol/L    Potassium 3.9 3.5 - 5.0 mmol/L    Chloride 102 98 - 107 mmol/L    CO2 24 22 - 31 mmol/L    Anion Gap, Calculation 11 5 - 18 mmol/L    Glucose 86 70 - 125 mg/dL    Calcium 9.7 8.5 - 10.5 mg/dL    BUN 17 8 - 28 mg/dL    Creatinine 0.66 0.60 - 1.10 mg/dL    GFR MDRD Af Amer >60 >60 mL/min/1.73m2    GFR MDRD Non Af Amer >60 >60 mL/min/1.73m2    Narrative    Fasting Glucose reference range is 70-99 mg/dL per  American Diabetes Association (ADA) guidelines.   HM2(CBC w/o Differential)   Result Value Ref Range    WBC 5.6 4.0 - 11.0 thou/uL    RBC 4.38 3.80 - 5.40 mill/uL    Hemoglobin 13.5 12.0 - 16.0 g/dL    Hematocrit 40.0 35.0 - 47.0 %    MCV 91 80 - 100 fL    MCH 30.8 27.0 - 34.0 pg    MCHC 33.7 32.0 - 36.0 g/dL    RDW 11.6 11.0 - 14.5 %    Platelets 199 140 - 440 thou/uL    MPV 7.6 7.0 - 10.0 fL       Your blood tests all look perfect    Please call with questions or contact us using [employer].    Sincerely,        Electronically signed by Aries Tovar MD

## 2021-06-19 NOTE — LETTER
Letter by Aries Tovar MD at      Author: Aries Tovar MD Service: -- Author Type: --    Filed:  Encounter Date: 5/16/2019 Status: (Other)         Marley Andrade  [address]  [address]  [place] [address]             May 16, 2019         Dear Ms. Andrade,    Below are the results from your recent visit:    Resulted Orders   DXA Bone Density Scan    Narrative    5/6/2019      RE: Marley Andrade  YOB: 1940        Dear Aries Tovar,    Patient Profile:  78 y.o. female, postmenopausal, is here for the follow up bone density   test.   History of fractures - None. Family history of osteoporosis - None.    Family history of hip fracture: None. Smoking history - No. Osteoporosis   treatment past -  Yes;  HRT, Evista and Prolia. Osteoporosis treatment   current - No.  Chronic medical problems - Celiac sprue (wheat   intolerance), Height loss, Hysterectomy and Oophorectomy. High risk   medications -  None.    Assessment:    1. The spine bone density is best assessed using L1-L3 with T-score of   -2.8.  2. Femoral bone densities show osteoporosis with a left femoral neck T-   score of -3.0, and right total hip T-score of -3.2.  3.  Since the scan in 2017, bone density is decreased a significant 6.8%   in the left total hip and 13.5% on the right.  The declines in the hip are   greater than what would be expected with age related change alone.    78 y.o. female with OSTEOPOROSIS and HIGH predicted fracture.        Recommendations:  Further evaluation and therapeutic intervention is advised with a recheck   in 1 to 2 years.      Bone densitometry was performed on your patient using our [employer]ani   densitometer. The results are summarized and a copy of the actual scans   are included for your review. In conformity with the International Society   of Clinical Densitometry's most recent position statement for DXA   interpretation (2015), the diagnosis will be made on the lowest  measured   T-score of the lumbar spine, femoral neck, total proximal femur or 33%   radius. Note the change in terminology for diagnostic classification from   OSTEOPENIA to LOW BONE MASS. All trending for sequential exams will be   done using multiple vertebrae or the total proximal femur. Fracture risk   is based on the WHO Fracture Risk Assessment Tool (FRAX). If additional   information is needed or if you would like to discuss the results, please   do not hesitate to call me.       Thank you for referring this patient to [facility].   We are happy to be of service in support of you and your practice. If you   have any questions or suggestions to improve our service, please call me   at [phone].     Sincerely,     Steve Saldana M.D. C.C.D.  Osteoporosis Services, [facility]         Your bone density shows that your bones are weakening despite the Prolia injections.  I recommend that you meet with our pharmacist, Tawnya Amador, to discuss other options or whether to continue the Prolia    Please call with questions or contact us using [employer].    Sincerely,        Electronically signed by Aries Tovar MD

## 2021-06-20 NOTE — LETTER
Letter by Aries Tovar MD at      Author: Aries Tovar MD Service: -- Author Type: --    Filed:  Encounter Date: 4/3/2020 Status: (Other)         Marley Andrade  [address]  [address]  [place] [address]             April 3, 2020         Dear Ms. Andrade,    Below are the results from your recent visit:    Resulted Orders   Thyroid Stimulating Hormone (TSH)   Result Value Ref Range    TSH 2.24 0.30 - 5.00 uIU/mL   Vitamin B12   Result Value Ref Range    Vitamin B-12 907 (H) 213 - 816 pg/mL   Vitamin D, Total (25-Hydroxy)   Result Value Ref Range    Vitamin D, Total (25-Hydroxy) 78.8 30.0 - 80.0 ng/mL    Narrative    Deficiency <10.0 ng/mL  Insufficiency 10.0-29.9 ng/mL  Sufficiency 30.0-80.0 ng/mL  Toxicity (possible) >100.0 ng/mL   HM2(CBC w/o Differential)   Result Value Ref Range    WBC 4.9 4.0 - 11.0 thou/uL    RBC 4.54 3.80 - 5.40 mill/uL    Hemoglobin 13.8 12.0 - 16.0 g/dL    Hematocrit 41.5 35.0 - 47.0 %    MCV 91 80 - 100 fL    MCH 30.4 27.0 - 34.0 pg    MCHC 33.2 32.0 - 36.0 g/dL    RDW 12.4 11.0 - 14.5 %    Platelets 221 140 - 440 thou/uL    MPV 7.8 7.0 - 10.0 fL   Comprehensive Metabolic Panel   Result Value Ref Range    Sodium 141 136 - 145 mmol/L    Potassium 4.4 3.5 - 5.0 mmol/L    Chloride 103 98 - 107 mmol/L    CO2 30 22 - 31 mmol/L    Anion Gap, Calculation 8 5 - 18 mmol/L    Glucose 111 70 - 125 mg/dL    BUN 15 8 - 28 mg/dL    Creatinine 0.74 0.60 - 1.10 mg/dL    GFR MDRD Af Amer >60 >60 mL/min/1.73m2    GFR MDRD Non Af Amer >60 >60 mL/min/1.73m2    Bilirubin, Total 0.5 0.0 - 1.0 mg/dL    Calcium 9.6 8.5 - 10.5 mg/dL    Protein, Total 6.5 6.0 - 8.0 g/dL    Albumin 3.8 3.5 - 5.0 g/dL    Alkaline Phosphatase 63 45 - 120 U/L    AST 20 0 - 40 U/L    ALT 14 0 - 45 U/L    Narrative    Fasting Glucose reference range is 70-99 mg/dL per  American Diabetes Association (ADA) guidelines.       Blood tests all look normal.  There is no reason here to explain fatigue    Please call  with questions or contact us using [employer].    Sincerely,        Electronically signed by Aries Tovar MD

## 2021-06-23 NOTE — TELEPHONE ENCOUNTER
RN cannot approve Refill Request    RN can NOT refill this medication med is not covered by policy/route to provider.    Maykel Ornelas, [facility] Triage/Med Refill 2/7/2019    Requested Prescriptions   Pending Prescriptions Disp Refills     raloxifene (EVISTA) 60 mg tablet [Pharmacy Med Name: RALOXIFENE 60MG TABLETS] 90 tablet 0     Sig: TAKE 1 TABLET(60 MG) BY MOUTH DAILY    There is no refill protocol information for this order

## 2021-06-30 NOTE — PROGRESS NOTES
"Progress Notes by Ernestina Hairston AuD at 5/20/2021  1:20 PM     Author: Ernestina Hairston AuD Service: -- Author Type: Audiologist    Filed: 5/20/2021  4:27 PM Encounter Date: 5/20/2021 Status: Signed    : Ernestina Hairston AuD (Audiologist)       Hearing evaluation in conjunction with ENT exam (Dr. Zaldivar)    Summary:  Audiology visit completed. Please see audiogram below or under \"media\" tab for history and results.    Transducer:  Both insert phones and circumaural headphones were used.    Reliability:    Good    Recommendations:  Follow-up with ENT; retest hearing annually (to monitor) or per medical management/patient concern.  Wear hearing protection consistently in noise to preserve residual hearing sensitivity.     Pilar Gabriel, [facility]  [place] Licensed Audiologist [id number]           "

## 2021-07-03 NOTE — ADDENDUM NOTE
Addendum Note by Domi Amador CMA at 8/2/2019 12:12 PM     Author: Domi Amador CMA Service: -- Author Type: Certified Medical Assistant    Filed: 8/6/2019  9:58 AM Date of Service: 8/2/2019 12:12 PM Status: Signed    : Domi Amador CMA (Certified Medical Assistant)    Encounter addended by: Domi Amador CMA on: 8/6/2019  9:58 AM      Actions taken: Charge Capture section accepted

## 2021-07-03 NOTE — ADDENDUM NOTE
Addendum Note by Bushra Tovar MD at 9/23/2019  1:26 PM     Author: Bushra Tovar MD Service: -- Author Type: Physician    Filed: 9/23/2019  1:26 PM Encounter Date: 9/20/2019 Status: Signed    : Bushra Tovar MD (Physician)    Addended by: BUSHRA TOVAR on: 9/23/2019 01:26 PM        Modules accepted: Orders

## 2021-07-05 ENCOUNTER — COMMUNICATION - HEALTHEAST (OUTPATIENT)
Dept: INTERNAL MEDICINE | Facility: CLINIC | Age: 81
End: 2021-07-05

## 2021-07-05 DIAGNOSIS — I95.1 ORTHOSTATIC HYPOTENSION: ICD-10-CM

## 2021-07-05 RX ORDER — MIDODRINE HYDROCHLORIDE 2.5 MG/1
TABLET ORAL
Qty: 180 TABLET | Refills: 0 | Status: SHIPPED | OUTPATIENT
Start: 2021-07-05 | End: 2021-11-09

## 2021-07-05 NOTE — TELEPHONE ENCOUNTER
Telephone Encounter by Victoria Hong RN at 7/5/2021 10:49 AM     Author: Victoria Hong RN Service: -- Author Type: Registered Nurse    Filed: 7/5/2021 10:49 AM Encounter Date: 7/5/2021 Status: Signed    : Victoria Hong RN (Registered Nurse)       RN cannot approve Refill Request    RN can NOT refill this medication med is not covered by policy/route to provider. Last office visit: 6/4/2018 Aries Tovar MD Last Physical: 4/24/2019 Last MTM visit: Visit date not found Last visit same specialty: 6/4/2018 Aries Tovar MD.  Next visit within 3 mo: Visit date not found  Next physical within 3 mo: Visit date not found      Zaki Chowdhury Connection Triage/Med Refill 7/5/2021    Requested Prescriptions   Pending Prescriptions Disp Refills   ? midodrine (PROAMATINE) 2.5 MG tablet [Pharmacy Med Name: MIDODRINE 2.5MG TABLETS] 180 tablet 0     Sig: TAKE 1 TABLET(2.5 MG) BY MOUTH TWICE DAILY WITH MEALS       There is no refill protocol information for this order

## 2021-07-09 ENCOUNTER — HOSPITAL ENCOUNTER (OUTPATIENT)
Dept: CT IMAGING | Facility: CLINIC | Age: 81
Discharge: HOME | End: 2021-07-09
Attending: OTOLARYNGOLOGY
Payer: MEDICARE

## 2021-07-09 ENCOUNTER — COMMUNICATION - HEALTHEAST (OUTPATIENT)
Dept: OTOLARYNGOLOGY | Facility: CLINIC | Age: 81
End: 2021-07-09

## 2021-07-09 ENCOUNTER — OFFICE VISIT - HEALTHEAST (OUTPATIENT)
Dept: OTOLARYNGOLOGY | Facility: CLINIC | Age: 81
End: 2021-07-09

## 2021-07-09 DIAGNOSIS — K21.9 LPRD (LARYNGOPHARYNGEAL REFLUX DISEASE): ICD-10-CM

## 2021-07-09 DIAGNOSIS — R09.82 PND (POST-NASAL DRIP): ICD-10-CM

## 2021-07-09 NOTE — TELEPHONE ENCOUNTER
Telephone Encounter by Ana Rivera at 7/9/2021  3:10 PM     Author: Ana Rivera Service: -- Author Type: --    Filed: 7/9/2021  3:12 PM Encounter Date: 7/9/2021 Status: Signed    : Ana Rivera       Patient called stating she needs to see Dr Zaldivar for a f/u visit after her tests are done. She needs to be seen the week of 07/12th. Ok to LM. Can someone assist?

## 2021-07-11 ENCOUNTER — HEALTH MAINTENANCE LETTER (OUTPATIENT)
Age: 81
End: 2021-07-11

## 2021-07-26 ENCOUNTER — TRANSFERRED RECORDS (OUTPATIENT)
Dept: HEALTH INFORMATION MANAGEMENT | Facility: CLINIC | Age: 81
End: 2021-07-26

## 2021-07-29 ENCOUNTER — OFFICE VISIT (OUTPATIENT)
Dept: OTOLARYNGOLOGY | Facility: CLINIC | Age: 81
End: 2021-07-29
Payer: MEDICARE

## 2021-07-29 DIAGNOSIS — J31.0 CHRONIC RHINITIS: ICD-10-CM

## 2021-07-29 DIAGNOSIS — K21.9 LPRD (LARYNGOPHARYNGEAL REFLUX DISEASE): ICD-10-CM

## 2021-07-29 DIAGNOSIS — R09.82 PND (POST-NASAL DRIP): Primary | ICD-10-CM

## 2021-07-29 DIAGNOSIS — J30.0 CHRONIC VASOMOTOR RHINITIS: ICD-10-CM

## 2021-07-29 DIAGNOSIS — R49.0 HOARSENESS: ICD-10-CM

## 2021-07-29 PROCEDURE — 99213 OFFICE O/P EST LOW 20 MIN: CPT | Mod: 25 | Performed by: OTOLARYNGOLOGY

## 2021-07-29 PROCEDURE — 31575 DIAGNOSTIC LARYNGOSCOPY: CPT | Performed by: OTOLARYNGOLOGY

## 2021-07-29 RX ORDER — IPRATROPIUM BROMIDE 21 UG/1
SPRAY, METERED NASAL
Qty: 30 ML | Refills: 11 | Status: SHIPPED | OUTPATIENT
Start: 2021-07-29 | End: 2022-08-17

## 2021-07-29 NOTE — LETTER
7/29/2021         RE: Marley Andrade  [address]  [address]  [place] [address]        Dear Colleague,    Thank you for referring your patient, Marley Andrade, to the [facility]. Please see a copy of my visit note below.    CHIEF COMPLAINT:  Recheck      HISTORY OF PRESENT ILLNESS    Marley was seen in follow up after trial of astelin nasal spray.     IMPRESSION:          Encounter Diagnoses   Name Primary?     Chronic rhinitis Yes     Dizziness       LPRD (laryngopharyngeal reflux disease)           RECOMMENDATIONS:     Trial of astelin nasal spray  Stop fluticasone  Start protonix as directed         REVIEW OF SYSTEMS    Review of Systems: a 10-system review is reviewed at this encounter.  See scanned document.     Budesonide, Hydrocodone-acetaminophen, Morphine, Oxycodone, and Topiramate     PHYSICAL EXAM:        HEAD: Normal appearance and symmetry:  No cutaneous lesions.      EARS:   Auricles normal         NOSE:    Dorsum:   straight       ORAL CAVITY/OROPHARYNX:    Lips:  Normal.     NECK:  Trachea:  midline       NEURO:   Alert and Oriented    GAIT AND STATION:  normal     RESPIRATORY:   Symmetry and Respiratory effort    PSYCH:   normal mood and affect    SKIN:  warm and dry         IMPRESSION:   Encounter Diagnoses   Name Primary?     PND (post-nasal drip) Yes     Chronic rhinitis               RECOMMENDATIONS:      Recommend cryoablation of posterior nasal nerve  XR esophagram  Continue PPI medication  Start atrovent nasal spray  Follow up 1-2 weeks with Douglast            Again, thank you for allowing me to participate in the care of your patient.        Sincerely,        Ton Zaldivar MD

## 2021-07-29 NOTE — PROGRESS NOTES
CHIEF COMPLAINT:  Recheck      HISTORY OF PRESENT ILLNESS    Marley was seen in follow up after trial of astelin nasal spray.     IMPRESSION:          Encounter Diagnoses   Name Primary?     Chronic rhinitis Yes     Dizziness       LPRD (laryngopharyngeal reflux disease)           RECOMMENDATIONS:     Trial of astelin nasal spray  Stop fluticasone  Start protonix as directed         REVIEW OF SYSTEMS    Review of Systems: a 10-system review is reviewed at this encounter.  See scanned document.     Budesonide, Hydrocodone-acetaminophen, Morphine, Oxycodone, and Topiramate     PHYSICAL EXAM:        HEAD: Normal appearance and symmetry:  No cutaneous lesions.      EARS:   Auricles normal         NOSE:    Dorsum:   straight       ORAL CAVITY/OROPHARYNX:    Lips:  Normal.     NECK:  Trachea:  midline       NEURO:   Alert and Oriented    GAIT AND STATION:  normal     RESPIRATORY:   Symmetry and Respiratory effort    PSYCH:   normal mood and affect    SKIN:  warm and dry         IMPRESSION:   Encounter Diagnoses   Name Primary?     PND (post-nasal drip) Yes     Chronic rhinitis               RECOMMENDATIONS:      Recommend cryoablation of posterior nasal nerve  XR esophagram  Continue PPI medication  Start atrovent nasal spray  Follow up 1-2 weeks with Zhao

## 2021-08-04 ENCOUNTER — HOSPITAL ENCOUNTER (OUTPATIENT)
Dept: RADIOLOGY | Facility: CLINIC | Age: 81
Discharge: HOME | End: 2021-08-04
Attending: OTOLARYNGOLOGY | Admitting: OTOLARYNGOLOGY
Payer: MEDICARE

## 2021-08-04 DIAGNOSIS — K21.9 LPRD (LARYNGOPHARYNGEAL REFLUX DISEASE): ICD-10-CM

## 2021-08-04 PROCEDURE — 74220 X-RAY XM ESOPHAGUS 1CNTRST: CPT

## 2021-08-04 PROCEDURE — 255N000001 HC RX 255: Performed by: OTOLARYNGOLOGY

## 2021-08-04 RX ADMIN — ANTACID/ANTIFLATULENT 4 G: 380; 550; 10; 10 GRANULE, EFFERVESCENT ORAL at 15:41

## 2021-08-10 ENCOUNTER — TELEPHONE (OUTPATIENT)
Dept: INTERNAL MEDICINE | Facility: CLINIC | Age: 81
End: 2021-08-10

## 2021-08-10 NOTE — TELEPHONE ENCOUNTER
Reason for Call:  Other call back    Detailed comments: pt calling with some result for Dr Ton Zaldivar (ENT)    Reflux symptom test -  27    Phone Number Patient can be reached at: Cell number on file:    Telephone Information:   Mobile [phone]       Best Time: anytime    Can we leave a detailed message on this number? YES    Call taken on 8/10/2021 at 1:47 PM by Nehal Borja

## 2021-08-11 NOTE — TELEPHONE ENCOUNTER
Left voicemail that she should take her Pantoprazole for 6 weeks and then see him in the clinic. Phone number left if she has additional questions.    Argenis Ontiveros RN  [facility]  [phone]

## 2021-08-19 ENCOUNTER — OFFICE VISIT (OUTPATIENT)
Dept: OTOLARYNGOLOGY | Facility: CLINIC | Age: 81
End: 2021-08-19
Payer: MEDICARE

## 2021-08-19 DIAGNOSIS — K22.5 ZENKER DIVERTICULUM: Primary | ICD-10-CM

## 2021-08-19 PROCEDURE — 99214 OFFICE O/P EST MOD 30 MIN: CPT | Performed by: OTOLARYNGOLOGY

## 2021-08-19 NOTE — PROGRESS NOTES
CHIEF COMPLAINT:  Recheck      HISTORY OF PRESENT ILLNESS    Marley was seen in follow up for CT sinus review.  Says excessive phlegmn is 40% improved with atrovent nasal spray, although it is still a problem for her.  She is using the spray as many as 5-6x a day.  Swallow study demostrates a moderate left-sided posterior esophageal diverticulum.  She is not choking or regurgitating food. No odynophagia or dysphagia.   Diagnoses       Codes Comments    Zenker diverticulum    -  Primary K22.5                  REVIEW OF SYSTEMS    Review of Systems: a 10-system review is reviewed at this encounter.  See scanned document.     Budesonide, Hydrocodone-acetaminophen, Morphine, Oxycodone, and Topiramate     PHYSICAL EXAM:        HEAD: Normal appearance and symmetry:  No cutaneous lesions.      EARS:   Auricles normal         NOSE:    Dorsum:   straight       ORAL CAVITY/OROPHARYNX:    Lips:  Normal.     NECK:  Trachea:  midline       NEURO:   Alert and Oriented    GAIT AND STATION:  normal     RESPIRATORY:   Symmetry and Respiratory effort    PSYCH:   normal mood and affect    SKIN:  warm and dry         IMPRESSION:   Encounter Diagnosis   Name Primary?     Zenker diverticulum Yes              RECOMMENDATIONS:    Referral to General Surgery for workup of Zenker's  Return ivist 4 weeks  Ton Med rinses 2x daily   Continue atrovent spray

## 2021-08-19 NOTE — LETTER
8/19/2021         RE: Marley Andrade  [address]  [address]  [place] [address]        Dear Colleague,    Thank you for referring your patient, Marley Andrade, to the [facility]. Please see a copy of my visit note below.    CHIEF COMPLAINT:  Recheck      HISTORY OF PRESENT ILLNESS    Marley was seen in follow up for CT sinus review.  Says excessive phlegmn is 40% improved with atrovent nasal spray, although it is still a problem for her.  She is using the spray as many as 5-6x a day.  Swallow study demostrates a moderate left-sided posterior esophageal diverticulum.  She is not choking or regurgitating food. No odynophagia or dysphagia.   Diagnoses       Codes Comments    Zenker diverticulum    -  Primary K22.5                  REVIEW OF SYSTEMS    Review of Systems: a 10-system review is reviewed at this encounter.  See scanned document.     Budesonide, Hydrocodone-acetaminophen, Morphine, Oxycodone, and Topiramate     PHYSICAL EXAM:        HEAD: Normal appearance and symmetry:  No cutaneous lesions.      EARS:   Auricles normal         NOSE:    Dorsum:   straight       ORAL CAVITY/OROPHARYNX:    Lips:  Normal.     NECK:  Trachea:  midline       NEURO:   Alert and Oriented    GAIT AND STATION:  normal     RESPIRATORY:   Symmetry and Respiratory effort    PSYCH:   normal mood and affect    SKIN:  warm and dry         IMPRESSION:   Encounter Diagnosis   Name Primary?     Zenker diverticulum Yes              RECOMMENDATIONS:    Referral to General Surgery for workup of Zenker's  Return ivist 4 weeks  Ton Med rinses 2x daily   Continue atrovent spray        Again, thank you for allowing me to participate in the care of your patient.        Sincerely,        Ton Zaldivar MD

## 2021-09-01 DIAGNOSIS — F51.01 PRIMARY INSOMNIA: ICD-10-CM

## 2021-09-01 RX ORDER — ESZOPICLONE 2 MG/1
2 TABLET, FILM COATED ORAL
Qty: 90 TABLET | Refills: 0 | Status: SHIPPED | OUTPATIENT
Start: 2021-09-01 | End: 2021-12-27

## 2021-09-01 NOTE — TELEPHONE ENCOUNTER
Pending Prescriptions:                       Disp   Refills    eszopiclone (LUNESTA) 2 MG tablet         90 tab*0            Sig: Take 1 tablet (2 mg) by mouth nightly as needed    Medication:  Eszopiclone  Last Date Filled 1/5/21   pulled: YES        Only PCP Prescribing? : YES  Taken as prescribed from physician notes? YES    CSA in last year: NO  Random Utox in last year: NO  (if any of the above answer NO - schedule with PCP)     Opioids + benzodiazepines? NO  (if the above answer YES - schedule with PCP every 6 months)     Is patient on the Executive Review Team? No      All responses suggest: PCP to review and advise

## 2021-09-05 ENCOUNTER — HEALTH MAINTENANCE LETTER (OUTPATIENT)
Age: 81
End: 2021-09-05

## 2021-09-23 ENCOUNTER — OFFICE VISIT (OUTPATIENT)
Dept: OTOLARYNGOLOGY | Facility: CLINIC | Age: 81
End: 2021-09-23
Payer: MEDICARE

## 2021-09-23 DIAGNOSIS — Q39.6 ESOPHAGEAL DIVERTICULUM: Primary | ICD-10-CM

## 2021-09-23 DIAGNOSIS — T17.310D: ICD-10-CM

## 2021-09-23 PROCEDURE — 99214 OFFICE O/P EST MOD 30 MIN: CPT | Performed by: OTOLARYNGOLOGY

## 2021-09-23 NOTE — LETTER
9/23/2021         RE: Marley Andrade  [address]  [address]  [place] [address]        Dear Colleague,    Thank you for referring your patient, Marley Andrade, to the [facility]. Please see a copy of my visit note below.    CHIEF COMPLAINT:  Recheck      HISTORY OF PRESENT ILLNESS    Marley was seen in follow up for excessive phlegmn.  She eats 3 meals a day with snack in between meals.  She will experience excessive phlegmn soon after the meal.  Mostly ends up swallowing the mucus.  No GI upset.  Not much improvemen with astelin nasal spray. The phlegmn can get to the point that she will gag on it.  No bitter taste.  Symptoms are worsening over the past 6 months.     Recent esophagram:    IMPRESSION:  1.  Moderate esophageal diverticulum in the cervical esophagus at approximately the C6 level. This is not a classic Zenker's as it is not in the midline but is left of midline along the posterior margin of the esophagus.  2.  Esophagram is otherwise normal.     REVIEW OF SYSTEMS    Review of Systems: a 10-system review is reviewed at this encounter.  See scanned document.     Budesonide, Hydrocodone-acetaminophen, Morphine, Oxycodone, and Topiramate     PHYSICAL EXAM:        HEAD: Normal appearance and symmetry:  No cutaneous lesions.      EARS:   Auricles normal        NOSE:    Dorsum:   straight       ORAL CAVITY/OROPHARYNX:    Lips:  Normal.     NECK:  Trachea:  midline       NEURO:   Alert and Oriented    GAIT AND STATION:  normal     RESPIRATORY:   Symmetry and Respiratory effort    PSYCH:   normal mood and affect    SKIN:  warm and dry         IMPRESSION:   Encounter Diagnosis   Name Primary?     Esophageal diverticulum Yes              RECOMMENDATIONS:    Referral to [facility] Otol[facility]ology for esophageal diverticulum discovered on recent esophagram.          Again, thank you for allowing me to participate in the care of your patient.        Sincerely,        Ton  MD Zen

## 2021-09-23 NOTE — PROGRESS NOTES
CHIEF COMPLAINT:  Recheck      HISTORY OF PRESENT ILLNESS    Marley was seen in follow up for excessive phlegmn.  She eats 3 meals a day with snack in between meals.  She will experience excessive phlegmn soon after the meal.  Mostly ends up swallowing the mucus.  No GI upset.  Not much improvemen with astelin nasal spray. The phlegmn can get to the point that she will gag on it.  No bitter taste.  Symptoms are worsening over the past 6 months.     Recent esophagram:    IMPRESSION:  1.  Moderate esophageal diverticulum in the cervical esophagus at approximately the C6 level. This is not a classic Zenker's as it is not in the midline but is left of midline along the posterior margin of the esophagus.  2.  Esophagram is otherwise normal.     REVIEW OF SYSTEMS    Review of Systems: a 10-system review is reviewed at this encounter.  See scanned document.     Budesonide, Hydrocodone-acetaminophen, Morphine, Oxycodone, and Topiramate     PHYSICAL EXAM:        HEAD: Normal appearance and symmetry:  No cutaneous lesions.      EARS:   Auricles normal        NOSE:    Dorsum:   straight       ORAL CAVITY/OROPHARYNX:    Lips:  Normal.     NECK:  Trachea:  midline       NEURO:   Alert and Oriented    GAIT AND STATION:  normal     RESPIRATORY:   Symmetry and Respiratory effort    PSYCH:   normal mood and affect    SKIN:  warm and dry         IMPRESSION:   Encounter Diagnosis   Name Primary?     Esophageal diverticulum Yes              RECOMMENDATIONS:    Referral to [facility]ology for esophageal diverticulum discovered on recent esophagram.

## 2021-09-24 ENCOUNTER — TELEPHONE (OUTPATIENT)
Dept: OTOLARYNGOLOGY | Facility: CLINIC | Age: 81
End: 2021-09-24
Payer: MEDICARE

## 2021-09-24 NOTE — TELEPHONE ENCOUNTER
[facility] Call Center    Phone Message    May a detailed message be left on voicemail: no     Reason for Call: Appointment Intake    Referring Provider Name: Ton Zaldivar MD in [facility]  Diagnosis and/or Symptoms: Esophageal diverticulum [Q39.6]    Action Taken: Message routed to:  [facility] [[id number]] - unable to locate dx in protocols    Travel Screening: Not Applicable

## 2021-09-28 ENCOUNTER — TELEPHONE (OUTPATIENT)
Dept: OTOLARYNGOLOGY | Facility: CLINIC | Age: 81
End: 2021-09-28

## 2021-09-28 NOTE — TELEPHONE ENCOUNTER
FUTURE VISIT INFORMATION      FUTURE VISIT INFORMATION:    Date: 1/4/2022    Time: 4 PM    [place] [facility]  REFERRAL INFORMATION:    Referring provider: Dr. Ton Zaldivar    Referring providers clinic: [facility]    Reason for visit/diagnosis: Esophageal Diverticulum    RECORDS REQUESTED FROM:       Clinic name Comments Records Status Imaging Status   [facility] 9/23/21, 8/19/21 - ENT OV with Dr. Zen Roberts    [facility] 4/26/21 - PCC OV with Dr. Guy Roberts    [facility] - Imaging 8/4/21 - XR Esophagram  7/12/21 - CT Sinus [facility] PACs   [facility] - U[facility]ency [facility] 11/18/18 - ED OV with Dr. Douglas  8/6/17 - ED OV with Dr. Pozo [facility]

## 2021-09-29 ENCOUNTER — TELEPHONE (OUTPATIENT)
Dept: OTOLARYNGOLOGY | Facility: CLINIC | Age: 81
End: 2021-09-29

## 2021-09-29 NOTE — TELEPHONE ENCOUNTER
Patient called stating she was referred to the [facility] ENT and is not able to get an appointment until January. She feels that she can not wait that long. She complains of constant mucous that comes up her throat in her mouth. Will check with Dr. Zaldivar and see if there is somewhere else she can be seen or what she can do.    Argenis Ontiveros RN  [facility]NT  [phone]

## 2021-10-05 NOTE — TELEPHONE ENCOUNTER
Left message for patient to call back.    Argenis Ontiveros RN  [facility]  [phone]

## 2021-10-11 DIAGNOSIS — I95.1 ORTHOSTATIC HYPOTENSION: ICD-10-CM

## 2021-10-12 RX ORDER — MIDODRINE HYDROCHLORIDE 2.5 MG/1
TABLET ORAL
Qty: 180 TABLET | Refills: 0 | Status: SHIPPED | OUTPATIENT
Start: 2021-10-12 | End: 2021-11-09

## 2021-10-12 NOTE — TELEPHONE ENCOUNTER
Routing refill request to provider for review/approval because:  Drug not on the [facility] refill protocol     Last Written Prescription Date:  4/13/21  Last Fill Quantity: 180,  # refills: 0   Last office visit provider:  4/26/21     Requested Prescriptions   Pending Prescriptions Disp Refills     midodrine (PROAMATINE) 2.5 MG tablet 180 tablet 0       There is no refill protocol information for this order          Karo Hines RN 10/12/21 10:37 AM

## 2021-11-05 ENCOUNTER — MYC MEDICAL ADVICE (OUTPATIENT)
Dept: INTERNAL MEDICINE | Facility: CLINIC | Age: 81
End: 2021-11-05
Payer: MEDICARE

## 2021-11-08 NOTE — TELEPHONE ENCOUNTER
Contacted patient by telephone and scheduled a visit with Dr. Tovar on 11/9/2021 to discuss MyChart message re: anxiety and itchy skin concerns. No further concerns or questions at this time.

## 2021-11-09 ENCOUNTER — VIRTUAL VISIT (OUTPATIENT)
Dept: INTERNAL MEDICINE | Facility: CLINIC | Age: 81
End: 2021-11-09
Payer: MEDICARE

## 2021-11-09 DIAGNOSIS — F41.1 GAD (GENERALIZED ANXIETY DISORDER): Primary | ICD-10-CM

## 2021-11-09 DIAGNOSIS — F02.80 LATE ONSET ALZHEIMER'S DISEASE WITHOUT BEHAVIORAL DISTURBANCE (H): ICD-10-CM

## 2021-11-09 DIAGNOSIS — G30.1 LATE ONSET ALZHEIMER'S DISEASE WITHOUT BEHAVIORAL DISTURBANCE (H): ICD-10-CM

## 2021-11-09 DIAGNOSIS — I95.1 ORTHOSTATIC HYPOTENSION: ICD-10-CM

## 2021-11-09 DIAGNOSIS — L28.0 LICHENOID DERMATITIS: ICD-10-CM

## 2021-11-09 DIAGNOSIS — K22.5 ZENKER'S DIVERTICULUM: ICD-10-CM

## 2021-11-09 PROCEDURE — 99443 PR PHYSICIAN TELEPHONE EVALUATION 21-30 MIN: CPT | Mod: 95 | Performed by: INTERNAL MEDICINE

## 2021-11-09 RX ORDER — LORAZEPAM 0.5 MG/1
0.5 TABLET ORAL EVERY 6 HOURS PRN
Qty: 10 TABLET | Refills: 1 | Status: SHIPPED | OUTPATIENT
Start: 2021-11-09 | End: 2021-11-22

## 2021-11-09 RX ORDER — MIDODRINE HYDROCHLORIDE 2.5 MG/1
TABLET ORAL
Qty: 180 TABLET | Refills: 3 | Status: SHIPPED | OUTPATIENT
Start: 2021-11-09 | End: 2022-09-26

## 2021-11-09 RX ORDER — ESCITALOPRAM OXALATE 5 MG/1
5 TABLET ORAL DAILY
Qty: 30 TABLET | Refills: 11 | Status: SHIPPED | OUTPATIENT
Start: 2021-11-09 | End: 2021-11-30

## 2021-11-09 ASSESSMENT — ANXIETY QUESTIONNAIRES
7. FEELING AFRAID AS IF SOMETHING AWFUL MIGHT HAPPEN: SEVERAL DAYS
3. WORRYING TOO MUCH ABOUT DIFFERENT THINGS: NOT AT ALL
IF YOU CHECKED OFF ANY PROBLEMS ON THIS QUESTIONNAIRE, HOW DIFFICULT HAVE THESE PROBLEMS MADE IT FOR YOU TO DO YOUR WORK, TAKE CARE OF THINGS AT HOME, OR GET ALONG WITH OTHER PEOPLE: NOT DIFFICULT AT ALL
2. NOT BEING ABLE TO STOP OR CONTROL WORRYING: NOT AT ALL
5. BEING SO RESTLESS THAT IT IS HARD TO SIT STILL: NOT AT ALL
1. FEELING NERVOUS, ANXIOUS, OR ON EDGE: SEVERAL DAYS
GAD7 TOTAL SCORE: 2
6. BECOMING EASILY ANNOYED OR IRRITABLE: NOT AT ALL

## 2021-11-09 ASSESSMENT — PATIENT HEALTH QUESTIONNAIRE - PHQ9: 5. POOR APPETITE OR OVEREATING: NOT AT ALL

## 2021-11-09 NOTE — PATIENT INSTRUCTIONS
Lorazepam 0.5 mg every 8 hours as needed    Lexapro 5 mg daily    Refill midodrine    Zenker's diverticulum discussed and would encourage surgery    Email follow-up 1 week for dose adjustment

## 2021-11-09 NOTE — PROGRESS NOTES
Marley is a 81 year old female being evaluated via a billable phone visit, and would like to be contacted via the following  Cell number on file:    Telephone Information:   Mobile [phone]        ASSESSMENT:  DX: 1. CATHY (generalized anxiety disorder)  Although could be episode of arrhythmia or hypoglycemia, will treat for generalized anxiety with both serotonin medication and as needed  - escitalopram (LEXAPRO) 5 MG tablet; Take 1 tablet (5 mg) by mouth daily  Dispense: 30 tablet; Refill: 11  - LORazepam (ATIVAN) 0.5 MG tablet; Take 1 tablet (0.5 mg) by mouth every 6 hours as needed for anxiety  Dispense: 10 tablet; Refill: 1    2. Zenker's diverticulum  Reviewed films.  May explain mucus production.  Recommend surgical fixation.  [facility] visit next week    3. Late onset Alzheimer's disease without behavioral disturbance (H)  Makes all problems more complicated    4. Lichenoid dermatitis  Dermatology note reviewed.  Decreasing anxiety may decrease dermatitis.  Otherwise continue cortisone creams    5. Orthostatic hypotension  Good response.  Refill  - midodrine (PROAMATINE) 2.5 MG tablet; [MIDODRINE (PROAMATINE) 2.5 MG TABLET] TAKE 1 TABLET(2.5 MG) BY MOUTH TWICE DAILY WITH MEALS  Dispense: 180 tablet; Refill: 3       PLAN:  Patient Instructions   Lorazepam 0.5 mg every 8 hours as needed    Lexapro 5 mg daily    Refill midodrine    Zenker's diverticulum discussed and would encourage surgery    Email follow-up 1 week for dose adjustment  Send me an update via [employer] in a few days     Return in about 3 months (around 2/9/2022) for using a video visit.    CHIEF COMPLAINT:  Chief Complaint   Patient presents with     Anxiety     anxiety panic attacks & possibly medications       HISTORY OF PRESENT ILLNESS:  Marley is a 81 year old female contacting the clinic today via phone for anxiety.  They have been introduced to a family member that they did not know existed and while the relationship has been cordial, and has  "felt much more anxious.  She has had a near panic attack on multiple occasions.  She is sleeping well.  She hates the feeling of increased anxiety and heart palpitations and wishes medication.  One of her friends takes Lexapro with good result    She has a history of collagenous colitis.  This caused a psoriasis-like picture.  Current dermatology notes describe lichenoid dermatitis.  She takes cortisone creams and finds that this has worsened markedly over the last few months.  She thinks it is worse by anxiety    We have exhaustively attempted to evaluate increased mucus production with sinus and esophagus treatment.  ENT discovered Zenker's diverticulum and [facility]  I advised surgical repair    REVIEW OF SYSTEMS:  Improved balance with midodrine    PFSH:  Social History     Social History Narrative     to  Judson    Enjoys tennis and golf    3 boys     New family member previously unknown through [employer]    TOBACCO USE:  History   Smoking Status     Former Smoker   Smokeless Tobacco     Never Used       VITALS:  There were no vitals filed for this visit.  There were no vitals taken for this visit. Estimated body mass index is 17.16 kg/m  as calculated from the following:    Height as of 6/29/20: 1.626 m (5' 4\").    Weight as of 6/29/20: 45.4 kg (100 lb).    PHYSICAL EXAM:  (observations via Phone)  Repeats herself.  Nervous but lucid.  Some word finding difficulties    MEDICATIONS  Current Outpatient Medications   Medication Sig Dispense Refill     azelastine (ASTELIN) 137 mcg (0.1 %) nasal spray [AZELASTINE (ASTELIN) 137 MCG (0.1 %) NASAL SPRAY] Use in each nostril as directed 30 mL 12     calcium carbonate-vitamin D2 500 mg(1,250mg) -200 unit tablet [CALCIUM CARBONATE-VITAMIN D2 500 MG(1,250MG) -200 UNIT TABLET] Take 1 tablet by mouth 2 (two) times a day.       cholecalciferol, vitamin D3, 2,000 unit Tab [CHOLECALCIFEROL, VITAMIN D3, 2,000 UNIT TAB] " Take 1 tablet by mouth daily.       escitalopram (LEXAPRO) 5 MG tablet Take 1 tablet (5 mg) by mouth daily 30 tablet 11     eszopiclone (LUNESTA) 2 MG tablet Take 1 tablet (2 mg) by mouth nightly as needed for sleep 90 tablet 0     famotidine (PEPCID) 20 MG tablet [FAMOTIDINE (PEPCID) 20 MG TABLET] TAKE 1 TABLET(20 MG) BY MOUTH AT BEDTIME 90 tablet 3     fluticasone propionate (FLONASE) 50 mcg/actuation nasal spray [FLUTICASONE PROPIONATE (FLONASE) 50 MCG/ACTUATION NASAL SPRAY] 2 sprays into each nostril daily. 48 g 3     ipratropium (ATROVENT) 0.03 % nasal spray 2 sprays each nostril 1-3x daily needed for PND 30 mL 11     LORazepam (ATIVAN) 0.5 MG tablet Take 1 tablet (0.5 mg) by mouth every 6 hours as needed for anxiety 10 tablet 1     melatonin 5 mg Tab tablet [MELATONIN 5 MG TAB TABLET] Take 5 mg by mouth at bedtime as needed.       midodrine (PROAMATINE) 2.5 MG tablet [MIDODRINE (PROAMATINE) 2.5 MG TABLET] TAKE 1 TABLET(2.5 MG) BY MOUTH TWICE DAILY WITH MEALS 180 tablet 3     sucralfate (CARAFATE) 1 gram tablet [SUCRALFATE (CARAFATE) 1 GRAM TABLET] TAKE 1 TABLET(1 GRAM) BY MOUTH TWICE DAILY 180 tablet 3     zaleplon (SONATA) 5 MG capsule [ZALEPLON (SONATA) 5 MG CAPSULE] Take 1 capsule (5 mg total) by mouth at bedtime as needed. 30 capsule 0       Notes summarized: ENT  Labs, x-rays, cardiology, GI tests reviewed: Esophagram personally reviewed  Recent Labs   Lab Test 04/03/20  1000   HGB 13.8      POTASSIUM 4.4   CR 0.74   B12 907*   TSH 2.24     No components found for: VITD  No results found for: VITDT  Lab Results   Component Value Date    CHOL 179 04/12/2017     New orders: No orders of the defined types were placed in this encounter.      Independent review of:  Supplemental history by:  Judson    Patient would like to receive their AVS by [facility]  [facility]    Phone Start Time: 4:21 PM  Phone End time:  5:08 PM  Conversation plus  orders: 47 minutes  Dictation time:  3 minutes    The visit lasted a total of 48 minutes

## 2021-11-10 ASSESSMENT — ANXIETY QUESTIONNAIRES: GAD7 TOTAL SCORE: 2

## 2021-11-22 DIAGNOSIS — F41.1 GAD (GENERALIZED ANXIETY DISORDER): ICD-10-CM

## 2021-11-22 RX ORDER — LORAZEPAM 0.5 MG/1
0.5 TABLET ORAL EVERY 6 HOURS PRN
Qty: 20 TABLET | Refills: 1 | Status: SHIPPED | OUTPATIENT
Start: 2021-11-22 | End: 2021-12-13

## 2021-11-30 DIAGNOSIS — F41.1 GAD (GENERALIZED ANXIETY DISORDER): ICD-10-CM

## 2021-11-30 RX ORDER — ESCITALOPRAM OXALATE 10 MG/1
10 TABLET ORAL DAILY
Qty: 30 TABLET | Refills: 11 | Status: SHIPPED | OUTPATIENT
Start: 2021-11-30 | End: 2022-08-17

## 2021-12-02 DIAGNOSIS — F41.1 GAD (GENERALIZED ANXIETY DISORDER): ICD-10-CM

## 2021-12-02 NOTE — TELEPHONE ENCOUNTER
Refill Request  Medication name: Pending Prescriptions:                       Disp   Refills    LORazepam (ATIVAN) 0.5 MG tablet          20 tab*1            Sig: Take 1 tablet (0.5 mg) by mouth every 6 hours as           needed for anxiety    Who prescribed the medication: PCP  Last refill on medication: 11/22/21  Requested Pharmacy: [facility] completed unknown   checked Unknown  Last appointment with PCP: 11/9/21  Next appointment: Not due

## 2021-12-05 ENCOUNTER — MYC MEDICAL ADVICE (OUTPATIENT)
Dept: INTERNAL MEDICINE | Facility: CLINIC | Age: 81
End: 2021-12-05
Payer: MEDICARE

## 2021-12-05 DIAGNOSIS — F51.01 PRIMARY INSOMNIA: ICD-10-CM

## 2021-12-05 NOTE — TELEPHONE ENCOUNTER
Routing refill request to provider for review/approval because:  Controlled substance request    Last Written Prescription Date:  11/2/21  Last Fill Quantity: 20,  # refills: 1   Last office visit provider:  11/9/21     Requested Prescriptions   Pending Prescriptions Disp Refills     LORazepam (ATIVAN) 0.5 MG tablet 20 tablet 1     Sig: Take 1 tablet (0.5 mg) by mouth every 6 hours as needed for anxiety       There is no refill protocol information for this order          Ev Dee RN 12/05/21 11:29 AM

## 2021-12-06 NOTE — TELEPHONE ENCOUNTER
Medication: Lorazepam  Last Date Filled 11/22/21   pulled: PROVIDER TO PULL FROM Epic.     Only PCP Prescribing? PROVIDER TO PULL  FROM Epic.  Taken as prescribed from physician notes? YES    CSA in last year: NO  Random Utox in last year: NO  (if any of the above answer NO - schedule with PCP)     Opioids + benzodiazepines? YES  (if the above answer YES - schedule with PCP every 6 months)     Is patient on the Executive Review Team? no      All responses suggest: Refilling prescription

## 2021-12-13 RX ORDER — LORAZEPAM 0.5 MG/1
0.5 TABLET ORAL EVERY 6 HOURS PRN
Qty: 20 TABLET | Refills: 1 | Status: SHIPPED | OUTPATIENT
Start: 2021-12-13 | End: 2022-06-28

## 2021-12-23 DIAGNOSIS — F51.01 PRIMARY INSOMNIA: ICD-10-CM

## 2021-12-23 RX ORDER — ESZOPICLONE 2 MG/1
2 TABLET, FILM COATED ORAL
Qty: 90 TABLET | Refills: 0 | Status: CANCELLED | OUTPATIENT
Start: 2021-12-23

## 2021-12-23 NOTE — TELEPHONE ENCOUNTER
Refill Request  Medication name: Pending Prescriptions:                       Disp   Refills    eszopiclone (LUNESTA) 2 MG tablet         90 tab*0            Sig: Take 1 tablet (2 mg) by mouth nightly as needed           for sleep    Who prescribed the medication: PCP  Last refill on medication: 9/1/21  Requested Pharmacy: [facility] completed unknown   checked Unknown  Last appointment with PCP: 11/23/21  Next appointment: Not due

## 2021-12-27 RX ORDER — ESZOPICLONE 2 MG/1
2 TABLET, FILM COATED ORAL
Qty: 90 TABLET | Refills: 1 | Status: SHIPPED | OUTPATIENT
Start: 2021-12-27 | End: 2022-06-13

## 2021-12-27 NOTE — TELEPHONE ENCOUNTER
Medication: Eszopiclone 2 mg  Last Date Filled 9/1/21   pulled: PROVIDER TO PULL FROM Epic.     Only PCP Prescribing? PROVIDER TO PULL  FROM Epic.  Taken as prescribed from physician notes? YES    CSA in last year: NO  Random Utox in last year: NO  (if any of the above answer NO - schedule with PCP)     Opioids + benzodiazepines? NO  (if the above answer YES - schedule with PCP every 6 months)     Is patient on the Executive Review Team? no      All responses suggest: Refilling prescription

## 2022-01-04 ENCOUNTER — PRE VISIT (OUTPATIENT)
Dept: OTOLARYNGOLOGY | Facility: CLINIC | Age: 82
End: 2022-01-04

## 2022-01-05 ENCOUNTER — TRANSFERRED RECORDS (OUTPATIENT)
Dept: HEALTH INFORMATION MANAGEMENT | Facility: CLINIC | Age: 82
End: 2022-01-05
Payer: MEDICARE

## 2022-02-16 ENCOUNTER — TRANSFERRED RECORDS (OUTPATIENT)
Dept: HEALTH INFORMATION MANAGEMENT | Facility: CLINIC | Age: 82
End: 2022-02-16
Payer: MEDICARE

## 2022-03-08 ENCOUNTER — TRANSFERRED RECORDS (OUTPATIENT)
Dept: HEALTH INFORMATION MANAGEMENT | Facility: CLINIC | Age: 82
End: 2022-03-08
Payer: MEDICARE

## 2022-05-16 DIAGNOSIS — F41.1 GAD (GENERALIZED ANXIETY DISORDER): Primary | ICD-10-CM

## 2022-05-18 ENCOUNTER — TELEPHONE (OUTPATIENT)
Dept: BEHAVIORAL HEALTH | Facility: CLINIC | Age: 82
End: 2022-05-18
Payer: MEDICARE

## 2022-05-18 NOTE — TELEPHONE ENCOUNTER
Writer called pt, pt scheduled for TC on 4/23 at 4pm with Zayra. Documentation and appt info sent via [employer]. Referral notified, added in tracker.    Alyce Posey  Care Coordinator  5.18  ----- Message from Skyler Malone sent at 5/18/2022  4:49 PM CDT -----  Regarding: Transition Clinic Referral  Transition Clinic Referral   [place] Only   Limited [place] Availability    Type of Referral:      ____X_Therapy  _____Therapy & Medication (Psychiatry next level of care appointment needs to be scheduled)  _____Medication Only (Psychiatry next level of care appointment needs to be scheduled)  _____Diagnostic Assessment Only      Referring Provider Name: Skyler Malone    Clinician completing the assessment. NA    Referring Provider: OTHER: NA    If known, referring provider contact name: NA; Phone Number: NA  Service Line/Location: NA    Reason for Transition Clinic Referral: To bridge the gap of care    Next Level of Care Patient Will Be Transitioned To: Long term Therapy    Start Date for Next Level of Care Therapy (Required): 10/25/22  Provider- Minnie Galvez  Location -[facility]    Start Date for Next Level of Care Medication (Required): -NA   Provider- LUIS MANUEL  Location-   TC Psychiatry cannot see patients who do not have active medical insurance    What Would Be Helpful from the Transition Clinic: - To provide the pt with therapy services until they are able to meet with their provider in October.     Needs: NO    Does Patient Have Access to Technology: No. Phone appointments will be better.    Patient E-mail Address: [email or web address]    Current Patient Phone Number: [phone];     Clinician Gender Preference (if applicable): NO    Skyler Malone

## 2022-05-20 ENCOUNTER — TELEPHONE (OUTPATIENT)
Dept: BEHAVIORAL HEALTH | Facility: CLINIC | Age: 82
End: 2022-05-20
Payer: MEDICARE

## 2022-05-20 NOTE — TELEPHONE ENCOUNTER
Pt called on que and cancelled appointment for 05/23/22 @ 4 pm and stated they would call back to schedule.    Zully Kaufman  05/20/22  [id number]

## 2022-06-10 DIAGNOSIS — F51.01 PRIMARY INSOMNIA: ICD-10-CM

## 2022-06-11 NOTE — TELEPHONE ENCOUNTER
Routing refill request to provider for review/approval because:  Drug not on the [facility] refill protocol     Last Written Prescription Date:  12/27/21  Last Fill Quantity: 90,  # refills: 1   Last office visit provider:  11/9/21     Requested Prescriptions   Pending Prescriptions Disp Refills     eszopiclone (LUNESTA) 2 MG tablet [Pharmacy Med Name: ESZOPICLONE 2MG TABLETS] 90 tablet      Sig: TAKE 1 TABLET(2 MG) BY MOUTH EVERY NIGHT AS NEEDED FOR SLEEP       There is no refill protocol information for this order          Irish Amanda, RN 06/10/22 8:55 PM

## 2022-06-13 RX ORDER — ESZOPICLONE 2 MG/1
TABLET, FILM COATED ORAL
Qty: 90 TABLET | Refills: 1 | Status: SHIPPED | OUTPATIENT
Start: 2022-06-13 | End: 2022-10-11

## 2022-06-13 NOTE — TELEPHONE ENCOUNTER
Controlled Substance Refill Request for Lunesta    Last refill: 12/27/2021 for 90 with 1    Last clinic visit: 11/9/2021     Clinic visit frequency required: Q 3 months  Next appt: Nothing scheduled     Controlled substance agreement on file: No.    Documentation in problem list reviewed:  Yes    Processing:  Rx to be electronically transmitted to pharmacy by provider      Titus West RN  [facility]

## 2022-06-28 DIAGNOSIS — F41.1 GAD (GENERALIZED ANXIETY DISORDER): ICD-10-CM

## 2022-06-28 RX ORDER — LORAZEPAM 0.5 MG/1
0.5 TABLET ORAL EVERY 6 HOURS PRN
Qty: 20 TABLET | Refills: 1 | Status: SHIPPED | OUTPATIENT
Start: 2022-06-28 | End: 2022-08-16

## 2022-06-30 DIAGNOSIS — G30.1 LATE ONSET ALZHEIMER'S DISEASE WITHOUT BEHAVIORAL DISTURBANCE (H): Primary | ICD-10-CM

## 2022-06-30 DIAGNOSIS — F02.80 LATE ONSET ALZHEIMER'S DISEASE WITHOUT BEHAVIORAL DISTURBANCE (H): Primary | ICD-10-CM

## 2022-08-07 ENCOUNTER — HEALTH MAINTENANCE LETTER (OUTPATIENT)
Age: 82
End: 2022-08-07

## 2022-08-08 ENCOUNTER — TELEPHONE (OUTPATIENT)
Dept: INTERNAL MEDICINE | Facility: CLINIC | Age: 82
End: 2022-08-08

## 2022-08-08 NOTE — TELEPHONE ENCOUNTER
General Call      Reason for Call:   calling stating that wife has dementia and would like to be put back on the medication that had been given to her for depression    Please advise    What are your questions or concerns:  n/a    Date of last appointment with provider: n/a    Could we send this information to you in [facility] or would you prefer to receive a phone call?:   Patient would prefer a phone call   Okay to leave a detailed message?: Yes at Home number on file [phone] (home)

## 2022-08-11 NOTE — TELEPHONE ENCOUNTER
TCB to get more information on pt's symptoms and what medication is pt's  referring.  Record indicates pt is taking escitalopram 10 mg daily.  Is pt taking this?  PT was also referred to mental health and memory clinic.  Has pt been able to schedule with these specialities?

## 2022-08-12 DIAGNOSIS — F41.1 GAD (GENERALIZED ANXIETY DISORDER): ICD-10-CM

## 2022-08-16 RX ORDER — LORAZEPAM 0.5 MG/1
TABLET ORAL
Qty: 20 TABLET | Status: CANCELLED | OUTPATIENT
Start: 2022-08-16

## 2022-08-16 RX ORDER — LORAZEPAM 0.5 MG/1
TABLET ORAL
Qty: 20 TABLET | Refills: 1 | Status: SHIPPED | OUTPATIENT
Start: 2022-08-16 | End: 2022-08-17

## 2022-08-17 ENCOUNTER — VIRTUAL VISIT (OUTPATIENT)
Dept: INTERNAL MEDICINE | Facility: CLINIC | Age: 82
End: 2022-08-17
Payer: MEDICARE

## 2022-08-17 ENCOUNTER — TELEPHONE (OUTPATIENT)
Dept: OTOLARYNGOLOGY | Facility: CLINIC | Age: 82
End: 2022-08-17

## 2022-08-17 DIAGNOSIS — Z00.00 PREVENTATIVE HEALTH CARE: ICD-10-CM

## 2022-08-17 DIAGNOSIS — F41.1 GAD (GENERALIZED ANXIETY DISORDER): Primary | ICD-10-CM

## 2022-08-17 DIAGNOSIS — G30.1 LATE ONSET ALZHEIMER'S DISEASE WITHOUT BEHAVIORAL DISTURBANCE (H): ICD-10-CM

## 2022-08-17 DIAGNOSIS — F02.80 LATE ONSET ALZHEIMER'S DISEASE WITHOUT BEHAVIORAL DISTURBANCE (H): ICD-10-CM

## 2022-08-17 DIAGNOSIS — Z98.890 HISTORY OF EXCISION OF ZENKER'S DIVERTICULUM: ICD-10-CM

## 2022-08-17 DIAGNOSIS — L28.0 LICHENOID DERMATITIS: ICD-10-CM

## 2022-08-17 DIAGNOSIS — F51.01 PRIMARY INSOMNIA: ICD-10-CM

## 2022-08-17 DIAGNOSIS — K21.00 GASTROESOPHAGEAL REFLUX DISEASE WITH ESOPHAGITIS WITHOUT HEMORRHAGE: ICD-10-CM

## 2022-08-17 DIAGNOSIS — Z87.19 HISTORY OF EXCISION OF ZENKER'S DIVERTICULUM: ICD-10-CM

## 2022-08-17 DIAGNOSIS — I47.19 ECTOPIC ATRIAL TACHYCARDIA (H): ICD-10-CM

## 2022-08-17 PROBLEM — R13.10 DYSPHAGIA: Status: ACTIVE | Noted: 2022-06-14

## 2022-08-17 PROBLEM — I47.10 SUPRAVENTRICULAR TACHYCARDIA (H): Status: ACTIVE | Noted: 2017-09-29

## 2022-08-17 PROBLEM — R13.10 DYSPHAGIA: Status: RESOLVED | Noted: 2022-06-14 | Resolved: 2022-08-17

## 2022-08-17 PROCEDURE — 99214 OFFICE O/P EST MOD 30 MIN: CPT | Mod: 95 | Performed by: INTERNAL MEDICINE

## 2022-08-17 RX ORDER — BETAMETHASONE DIPROPIONATE 0.05 %
OINTMENT (GRAM) TOPICAL
COMMUNITY
Start: 2022-01-28 | End: 2023-01-19

## 2022-08-17 RX ORDER — ZALEPLON 5 MG/1
5 CAPSULE ORAL
Qty: 30 CAPSULE | Refills: 0 | COMMUNITY
Start: 2022-08-17 | End: 2022-09-26

## 2022-08-17 RX ORDER — LORAZEPAM 0.5 MG/1
0.5 TABLET ORAL EVERY 8 HOURS PRN
Qty: 60 TABLET | Refills: 1 | Status: SHIPPED | OUTPATIENT
Start: 2022-08-17 | End: 2022-09-26

## 2022-08-17 RX ORDER — FAMOTIDINE 20 MG/1
TABLET, FILM COATED ORAL
Qty: 30 TABLET | Refills: 3 | Status: SHIPPED | OUTPATIENT
Start: 2022-08-17 | End: 2022-09-26

## 2022-08-17 ASSESSMENT — ENCOUNTER SYMPTOMS: NERVOUS/ANXIOUS: 1

## 2022-08-17 NOTE — TELEPHONE ENCOUNTER
[facility] Call Center    Phone Message    May a detailed message be left on voicemail: no     Reason for Call: History of excision of Zenker's diverticulum     Was Clinic Available: no    Question regarding protocol:  Zenker's surgery follow up     Is there a referral for the requested specialist/specialty? Yes    Name of referring provider: Aries Tovar MD    Location of referring provider: [facility]      Action Taken: Message routed to:  [facility] jacob ent    Travel Screening: Not Applicable

## 2022-08-17 NOTE — PROGRESS NOTES
"Marley is a 82 year old who is being evaluated via a billable video visit.      How would you like to obtain your AVS? MyChart  If the video visit is dropped, the invitation should be resent by: Text to cell phone: [phone]  Will anyone else be joining your video visit? No  {If patient encounters technical issues they should call [phone] :351738}        {PROVIDER CHARTING PREFERENCE:670488}    Subjective   Marley is a 82 year old accompanied by her spouse, presenting for the following health issues:  Anxiety and Recheck Medication (Pt reports that she has experienced Anxiety for about a year now. Pt also report that it's intermintent and feels light headed when awake)      Anxiety         {SUPERLIST (Optional):036209}  {additonal problems for provider to add (Optional):788067}    Review of Systems   Psychiatric/Behavioral: The patient is nervous/anxious.       {ROS COMP (Optional):555542}      Objective           Vitals:  No vitals were obtained today due to virtual visit.    Physical Exam   {video visit exam brief selected:837380::\"GENERAL: Healthy, alert and no distress\",\"EYES: Eyes grossly normal to inspection.  No discharge or erythema, or obvious scleral/conjunctival abnormalities.\",\"RESP: No audible wheeze, cough, or visible cyanosis.  No visible retractions or increased work of breathing.  \",\"SKIN: Visible skin clear. No significant rash, abnormal pigmentation or lesions.\",\"NEURO: Cranial nerves grossly intact.  Mentation and speech appropriate for age.\",\"PSYCH: Mentation appears normal, affect normal/bright, judgement and insight intact, normal speech and appearance well-groomed.\"}    {Diagnostic Test Results (Optional):558680}    {AMBULATORY ATTESTATION (Optional):344823}        Video-Visit Details    Video Start Time: {video visit start/end time for provider to select:307347}    Type of service:  Video Visit    Video End Time:{video visit start/end time for provider to select:071568}    Originating " Location (pt. Location): Home    Distant Location (provider location):  [facility]     Platform used for Video Visit: Urmila    .  Shawn.

## 2022-08-17 NOTE — PATIENT INSTRUCTIONS
Continue lorazepam up to 1 tablet daily and increase quantity to 60    Resume famotidine 20 mg daily as needed    ENT follow-up for Zenker's diverticulum repair last week per [facility]

## 2022-08-17 NOTE — TELEPHONE ENCOUNTER
Writer called patient and spouse to explain we cannot see patient as they just had surgery at [place] for this diagnosis. We we not do other clinic's post op appts. Patient's spouse will reach back out after he reads through patients information

## 2022-08-17 NOTE — PROGRESS NOTES
Marley is a 82 year old female contacting the clinic today via video, who will use the platform: [employer] by [employer] for the visit.  Phone # for [employer], or if Amwell drops:   Telephone Information:   Mobile [phone]          ASSESSMENT and PLAN:  1. CATHY (generalized anxiety disorder)  Requests larger supply of lorazepam.   review suggests taking 1 pill every 1 to 2 days for sleep or anxiety.   will take more active role  - LORazepam (ATIVAN) 0.5 MG tablet; Take 1 tablet (0.5 mg) by mouth every 8 hours as needed for anxiety or sleep  Dispense: 60 tablet; Refill: 1    2. Late onset Alzheimer's disease without behavioral disturbance (H)   will take more active role    3. Ectopic atrial tachycardia (H)  Stable    4. Primary insomnia  Reviewed as below  - zaleplon (SONATA) 5 MG capsule; Take 1 capsule (5 mg) by mouth nightly as needed for sleep  Dispense: 30 capsule; Refill: 0    5. History of excision of Zenker's diverticulum  Successful surgery.  Follow-up local ENT per [facility] advice  - Adult ENT  Referral; Future    6. Lichenoid dermatitis  To see dermatology today    7. Gastroesophageal reflux disease with esophagitis without hemorrhage  Add back low-dose antacid  - famotidine (PEPCID) 20 MG tablet; [FAMOTIDINE (PEPCID) 20 MG TABLET] TAKE 1 TABLET(20 MG) BY MOUTH AT BEDTIME  Dispense: 30 tablet; Refill: 3    8. Preventative health care  - REVIEW OF HEALTH MAINTENANCE PROTOCOL ORDERS     Patient Instructions   Continue lorazepam up to 1 tablet daily and increase quantity to 60    Resume famotidine 20 mg daily as needed    ENT follow-up for Zenker's diverticulum repair last week per [facility]            Return in about 6 months (around 2/17/2023) for using a video visit.       CHIEF COMPLAINT:  Chief Complaint   Patient presents with     Anxiety     Recheck Medication     Pt reports that she has experienced Anxiety for about a year now. Pt also report that it's intermintent and feels light headed when  "awake       HISTORY OF PRESENT ILLNESS:  Marley is a 82 year old female contacting the clinic today via video for complaints of ongoing anxiety.  Memory is slowly worsening and is exposing underlying anxiety.  She has received a few prescriptions for lorazepam 0.5 mg.   review shows fill in June and July 20 tablets each time with 40 tablets lasting about 45 days.  She takes these herself and  promises to take more active role.  She uses it for anxiety or sleep.  A trial of Escitalopram caused side effects.  She takes Sonata if she wakes up with nightmares    She underwent repair of a Zenker's diverticulum last week at the [facility].  This has improved her symptoms of phlegm and they advised follow-up locally at 3 months    She continues to feel lightheaded.  Blood pressure has been \"good\".  Midodrine initiated 2 years ago has been helpful and we discussed increasing the dose pending systolic blood pressure monitoring.  Heart rate has been stable    REVIEW OF SYSTEMS:   No peripheral edema.  Resolved phlegm.  Healing incision.  Nausea today    PFSH:  Social History     Social History Narrative     to  Judson    Enjoys tennis and golf    3 boys       TOBACCO USE:  History   Smoking Status     Former Smoker   Smokeless Tobacco     Never Used       VITALS:  There were no vitals filed for this visit.  There were no vitals taken for this visit. Estimated body mass index is 17.16 kg/m  as calculated from the following:    Height as of 6/29/20: 1.626 m (5' 4\").    Weight as of 6/29/20: 45.4 kg (100 lb).    PHYSICAL EXAM:  (observations via Video)  Fatigued today    MEDICATIONS:   Current Outpatient Medications   Medication Sig Dispense Refill     cholecalciferol, vitamin D3, 2,000 unit Tab [CHOLECALCIFEROL, VITAMIN D3, 2,000 UNIT TAB] Take 1 tablet by mouth daily.       eszopiclone (LUNESTA) 2 MG tablet TAKE 1 TABLET(2 MG) BY MOUTH EVERY NIGHT AS NEEDED FOR SLEEP 90 tablet 1     famotidine (PEPCID) 20 " MG tablet [FAMOTIDINE (PEPCID) 20 MG TABLET] TAKE 1 TABLET(20 MG) BY MOUTH AT BEDTIME 30 tablet 3     LORazepam (ATIVAN) 0.5 MG tablet Take 1 tablet (0.5 mg) by mouth every 8 hours as needed for anxiety or sleep 60 tablet 1     midodrine (PROAMATINE) 2.5 MG tablet [MIDODRINE (PROAMATINE) 2.5 MG TABLET] TAKE 1 TABLET(2.5 MG) BY MOUTH TWICE DAILY WITH MEALS 180 tablet 3     zaleplon (SONATA) 5 MG capsule Take 1 capsule (5 mg) by mouth nightly as needed for sleep 30 capsule 0     betamethasone dipropionate (DIPROSONE) 0.05 % external ointment APPLY 1 APPICATION TWICE DAILY TOPICALLY TO THE ITCHING AREAS ON THE ARMS AND LEGS FOR 2 WEEKS AT A TIME.         Outside Notes summarized: [facility] operative report  Labs, x-rays, cardiology, GI tests reviewed: [facility]  No results for input(s): HGB, WBC, NA, POTASSIUM, CR, A1C, PSA, URIC, B12, TSH, VITDT, SED, CRP in the last 68040 hours.  No results found for: [id number]  Lab Results   Component Value Date    CHOL 179 04/12/2017     New orders:   Orders Placed This Encounter   Procedures     REVIEW OF HEALTH MAINTENANCE PROTOCOL ORDERS     Adult ENT  Referral       Independent review of:    Supplemental history by:  Judson    Patient has given verbal consent to a Video visit?  Yes  How would you like to obtain your AVS?  MyChart  Will anyone else be joining your video visit?  Yes, gwen Roper     Video-Visit Details  Type of service:  Video Visit  Originating Location (pt. Location): Home  Distant Location (provider location):   [facility]    Aries Tovar MD  [facility]    Video Start Time: 9:48 AM  Video End time:  10:22 AM  Face to face plus orders: 34 minutes  Documentation time:  3 minutes     The visit lasted a total of 37 minutes

## 2022-08-19 DIAGNOSIS — K21.00 GASTROESOPHAGEAL REFLUX DISEASE WITH ESOPHAGITIS WITHOUT HEMORRHAGE: ICD-10-CM

## 2022-08-19 RX ORDER — FAMOTIDINE 20 MG/1
TABLET, FILM COATED ORAL
Qty: 90 TABLET | Refills: 3 | OUTPATIENT
Start: 2022-08-19

## 2022-09-01 ENCOUNTER — TELEPHONE (OUTPATIENT)
Dept: INTERNAL MEDICINE | Facility: CLINIC | Age: 82
End: 2022-09-01

## 2022-09-01 NOTE — TELEPHONE ENCOUNTER
General Call      Reason for Call:   Judson calling with Stystolic readings:  08/28 -- 106  08/29 -- 99  08/30 -- 93  08/30 -- 116  08/31 -- 104  08/31 -- 99  09/01 -- 80    Pt very dizzy        What are your questions or concerns:  n/a    Date of last appointment with provider: n/a    Could we send this information to you in [employer] or would you prefer to receive a phone call?:   Patient would prefer a phone call   Okay to leave a detailed message?: Yes at Home number on file [phone] (home)

## 2022-09-02 NOTE — CONFIDENTIAL NOTE
If the low blood pressures are associated with dizziness, I recommend increasing midodrine from 2.5 mg twice daily to 5 mg twice daily    If there is no association with low blood pressure and dizziness, leave the midodrine dose the same

## 2022-09-06 NOTE — TELEPHONE ENCOUNTER
Aries Tovar MD 4 days ago     WB          If the low blood pressures are associated with dizziness, I recommend increasing midodrine from 2.5 mg twice daily to 5 mg twice daily     If there is no association with low blood pressure and dizziness, leave the midodrine dose the same

## 2022-09-06 NOTE — TELEPHONE ENCOUNTER
Spoke with Judson, pt's , pt's voice was heard briefly in background requesting Judson to update this RN.     Friday 9/2/22 pt and  went down to [facility] and    Diagnoses    Repair Zenker's Diverticulum Status Post       Procedures    FL Esophagram Single Contrast     Imaging resulted in finding a hole her pt's esophagus. Pt and  are back home now, she has a nasogastric tube and is going back to [facility] 9/12/22 for reassessment.     Judson said that blood pressures were find at [facility] and that they are continuing to monitor at home. He said that he will contact [facility] if anything is needed during this time. Judson reported that [facility] is handling pt's care right now however he did appreciate the return phone call.

## 2022-09-08 ENCOUNTER — TRANSFERRED RECORDS (OUTPATIENT)
Dept: HEALTH INFORMATION MANAGEMENT | Facility: CLINIC | Age: 82
End: 2022-09-08

## 2022-09-23 ENCOUNTER — NURSE TRIAGE (OUTPATIENT)
Dept: NURSING | Facility: CLINIC | Age: 82
End: 2022-09-23

## 2022-09-23 NOTE — TELEPHONE ENCOUNTER
Spoke with patient's  Judson and assisted him in making an appointment with Dr. Tovar for Marley on Monday.    Provider Recommendation Follow Up:   Reached patient/caregiver. Informed of provider's recommendations. Patient verbalized understanding and agrees with the plan.

## 2022-09-23 NOTE — TELEPHONE ENCOUNTER
"Nurse Triage SBAR    Is this a 2nd Level Triage? NO    Situation: Patient is still experiencing dizziness     Background: chronic  Consent on file-  and with patient for call    Assessment: dizziness that has been present for one one year.     Taking midodrine and doesn't seem to make a difference   States that she feel weak upon standing and woozy   Able to walk without falling but has to take her time  States they notice some decrease in the afternoon   States that they don't get any abnormal readings  SBP is 100-110 when she is light headed   then provided recent BP readings but was unable to provide writer with the dates they were taken on:  125/75 80 pulse-   94/77 80  Pulse  80/54 81   99/70 86 - unable to give date on these readings     Was able to check BP on the phone: patient is currently light headed: 120/80 pulse 79 with a second reading of 102/64 pulse 79  Drinking adequate water, does drink some coffee as well    States that they have been taking midodrine two in the AM and 2 at night- just started doing the increased dose of midodrine 2 days ago  - upon research, midodrine has a rare side effect of increased dizziness.       Protocol Recommended Disposition:   No disposition on file.    Recommendation: Provider please review and advise on additional action.  states that he doesn't feel her light headedness is related to her BP- states her doesn't feel they \"coorelate\"     Routed to provider    Does the patient meet one of the following criteria for ADS visit consideration? 16+ years old, with an [facility] PCP     TIP  Providers, please consider if this condition is appropriate for management at one of our Acute and Diagnostic Services sites.     If patient is a good candidate, please use dotphrase <dot>triageresponse and select Refer to ADS to document.    Reason for Disposition    Taking a medicine that could cause dizziness (e.g., blood pressure medications, diuretics)    Additional " Information    Negative: SEVERE difficulty breathing (e.g., struggling for each breath, speaks in single words)    Negative: Shock suspected (e.g., cold/pale/clammy skin, too weak to stand, low BP, rapid pulse)    Negative: Difficult to awaken or acting confused (e.g., disoriented, slurred speech)    Negative: Fainted, and still feels dizzy afterwards    Negative: Overdose (accidental or intentional) of medications    Negative: New neurologic deficit that is present now: * Weakness of the face, arm, or leg on one side of the body * Numbness of the face, arm, or leg on one side of the body * Loss of speech or garbled speech    Negative: Heart beating < 50 beats per minute OR > 140 beats per minute    Negative: Sounds like a life-threatening emergency to the triager    Negative: Chest pain    Negative: Rectal bleeding, bloody stool, or tarry-black stool    Negative: Vomiting is main symptom    Negative: Diarrhea is main symptom    Negative: Headache is main symptom    Negative: Heat exhaustion suspected (i.e., dehydration from heat exposure)    Negative: Patient states that they are having an anxiety or panic attack    Negative: Dizziness from low blood sugar (i.e., < 60 mg/dl or 3.5 mmol/l)    Negative: SEVERE dizziness (e.g., unable to stand, requires support to walk, feels like passing out now)    Negative: SEVERE headache or neck pain    Negative: Spinning or tilting sensation (vertigo) present now and one or more stroke risk factors (i.e., hypertension, diabetes mellitus, prior stroke/TIA, heart attack, age over 60) (Exception: prior physician evaluation for this AND no different/worse than usual)    Negative: Neurologic deficit that was brief (now gone), ANY of the following:* Weakness of the face, arm, or leg on one side of the body* Numbness of the face, arm, or leg on one side of the body* Loss of speech or garbled speech    Negative: Loss of vision or double vision  (Exception: Similar to previous  migraines.)    Negative: Extra heart beats OR irregular heart beating (i.e., 'palpitations')    Negative: Difficulty breathing    Negative: Drinking very little and has signs of dehydration (e.g., no urine > 12 hours, very dry mouth, very lightheaded)    Negative: Follows bleeding (e.g., stomach, rectum, vagina)  (Exception: Became dizzy from sight of small amount blood.)    Negative: Patient sounds very sick or weak to the triager    Negative: Lightheadedness (dizziness) present now, after 2 hours of rest and fluids    Negative: Spinning or tilting sensation (vertigo) present now    Negative: Fever > 103 F (39.4 C)    Negative: Fever > 100.0 F (37.8 C) and has diabetes mellitus or a weak immune system (e.g., HIV positive, cancer chemotherapy, organ transplant, splenectomy, chronic steroids)    Negative: MODERATE dizziness (e.g., interferes with normal activities) (Exception: dizziness caused by heat exposure, sudden standing, or poor fluid intake)    Negative: Vomiting occurs with dizziness    Negative: Patient wants to be seen    Protocols used: DIZZINESS-A-OH

## 2022-09-23 NOTE — TELEPHONE ENCOUNTER
It sounds like this has been an ongoing issue and the patient is on midodrine to try and help with blood pressure.  Its been going on for over 1 year.  Please help the patient make a follow-up appointment with Dr. Aries Tovar next week.

## 2022-09-26 ENCOUNTER — VIRTUAL VISIT (OUTPATIENT)
Dept: INTERNAL MEDICINE | Facility: CLINIC | Age: 82
End: 2022-09-26
Payer: MEDICARE

## 2022-09-26 DIAGNOSIS — G30.1 LATE ONSET ALZHEIMER'S DISEASE WITHOUT BEHAVIORAL DISTURBANCE (H): ICD-10-CM

## 2022-09-26 DIAGNOSIS — F51.01 PRIMARY INSOMNIA: ICD-10-CM

## 2022-09-26 DIAGNOSIS — F41.1 GAD (GENERALIZED ANXIETY DISORDER): Primary | ICD-10-CM

## 2022-09-26 DIAGNOSIS — F02.80 LATE ONSET ALZHEIMER'S DISEASE WITHOUT BEHAVIORAL DISTURBANCE (H): ICD-10-CM

## 2022-09-26 PROCEDURE — 99443 PR PHYSICIAN TELEPHONE EVALUATION 21-30 MIN: CPT | Mod: 95 | Performed by: INTERNAL MEDICINE

## 2022-09-26 RX ORDER — RIVASTIGMINE 4.6 MG/24H
1 PATCH, EXTENDED RELEASE TRANSDERMAL DAILY
Qty: 30 PATCH | Refills: 11 | Status: SHIPPED | OUTPATIENT
Start: 2022-09-26 | End: 2023-03-10

## 2022-09-26 RX ORDER — RISPERIDONE 0.25 MG/1
0.25 TABLET ORAL EVERY 8 HOURS PRN
Qty: 20 TABLET | Refills: 3 | Status: SHIPPED | OUTPATIENT
Start: 2022-09-26 | End: 2022-12-29

## 2022-09-26 RX ORDER — PAROXETINE 10 MG/1
5 TABLET, FILM COATED ORAL EVERY MORNING
Qty: 15 TABLET | Refills: 11 | Status: SHIPPED | OUTPATIENT
Start: 2022-09-26 | End: 2023-03-10

## 2022-09-26 NOTE — PATIENT INSTRUCTIONS
Discontinue lorazepam    Continue Lunesta 2 mg at bed    Did not fill Sonata    Dyspnea Pepcid    Discontinue midodrine    Paxil 1/2 tablet 5 mg each morning    Risperdal 0.25 mg every 8 hours as needed to replace lorazepam    Exelon patch once daily-to begin next week after other adjustments    If no improvement consider changing Lunesta to quetiapine

## 2022-09-26 NOTE — PROGRESS NOTES
Marley is a 82 year old female being evaluated via a billable phone visit, and would like to be contacted via the following  's number on file:[phone]    ASSESSMENT and PLAN:  1. CATHY (generalized anxiety disorder)  Trial of Paxil.  Previously did not tolerate Lexapro.  Use Risperdal instead of lorazepam to avoid rebound or worsening.  Consider quetiapine  - PARoxetine (PAXIL) 10 MG tablet; Take 0.5 tablets (5 mg) by mouth every morning  Dispense: 15 tablet; Refill: 11  - risperiDONE (RISPERDAL) 0.25 MG tablet; Take 1 tablet (0.25 mg) by mouth every 8 hours as needed (anxiety)  Dispense: 20 tablet; Refill: 3    2. Late onset Alzheimer's disease without behavioral disturbance (H)  Trial of rivastigmine with hallucination component.  Previously did not tolerate Aricept or Razadyne or Namenda  - rivastigmine (EXELON) 4.6 MG/24HR 24 hr patch; Place 1 patch onto the skin daily  Dispense: 30 patch; Refill: 11    3. Primary insomnia  Continue Lunesta for now but may need to consider quetiapine    4.  Zenker's diverticulum.  Status postsurgical repair.  Discontinue famotidine     Patient Instructions   Discontinue lorazepam    Continue Lunesta 2 mg at bed    Did not fill [facility]    Dyspnea Pepcid    Discontinue midodrine    Paxil 1/2 tablet 5 mg each morning    Risperdal 0.25 mg every 8 hours as needed to replace lorazepam    Exelon patch once daily-to begin next week after other adjustments    If no improvement consider changing Lunesta to quetiapine            Return in about 3 months (around 12/26/2022) for using a video visit.       CHIEF COMPLAINT:  Chief Complaint   Patient presents with     Dizziness     When she gets up in morning, blood presure is not low, can have it when lying she is this way 90% of the time     Dementia     Getting worse, has the 2 pills lorazepam how often can she has them and if she can it with the sleeping pill       HISTORY OF PRESENT ILLNESS:  Marley is a 82 year old female contacting  "the clinic today via phone for review of mental status.   Judson reports that she is becoming more agitated and hallucinating.  She takes Lunesta each night for sleep which seems to help.  He needs to give her lorazepam more and more frequently and we discussed cycling and rebound.    Her Alzheimer's dementia is worsening.  She is having more hallucinations.  We have tried Aricept, Razadyne and Namenda but with side effects.  A trial of Lexapro was ineffective or caused side effects    Dizziness continues.  ENT vertigo treatment was ineffective.   cannot correlate blood pressure and an empiric increase in midodrine was ineffective    REVIEW OF SYSTEMS:  Reasonably good sleep on Lunesta    PFSH:  Social History     Social History Narrative     to  Judson    Enjoys tennis and golf    3 boys     Planning to move to assisted living with options for memory care    TOBACCO USE:  History   Smoking Status     Former Smoker   Smokeless Tobacco     Never Used       VITALS:  There were no vitals filed for this visit.  There were no vitals taken for this visit. Estimated body mass index is 17.16 kg/m  as calculated from the following:    Height as of 6/29/20: 1.626 m (5' 4\").    Weight as of 6/29/20: 45.4 kg (100 lb).    PHYSICAL EXAM:  (observations via Phone)  Anxious but appropriate questions    MEDICATIONS  Current Outpatient Medications   Medication Sig Dispense Refill     betamethasone dipropionate (DIPROSONE) 0.05 % external ointment APPLY 1 APPICATION TWICE DAILY TOPICALLY TO THE ITCHING AREAS ON THE ARMS AND LEGS FOR 2 WEEKS AT A TIME.       cholecalciferol, vitamin D3, 2,000 unit Tab [CHOLECALCIFEROL, VITAMIN D3, 2,000 UNIT TAB] Take 1 tablet by mouth daily.       eszopiclone (LUNESTA) 2 MG tablet TAKE 1 TABLET(2 MG) BY MOUTH EVERY NIGHT AS NEEDED FOR SLEEP 90 tablet 1     PARoxetine (PAXIL) 10 MG tablet Take 0.5 tablets (5 mg) by mouth every morning 15 tablet 11     risperiDONE (RISPERDAL) " 0.25 MG tablet Take 1 tablet (0.25 mg) by mouth every 8 hours as needed (anxiety) 20 tablet 3     rivastigmine (EXELON) 4.6 MG/24HR 24 hr patch Place 1 patch onto the skin daily 30 patch 11       Notes summarized: [facility]  Labs, x-rays, cardiology, GI tests reviewed:   No results for input(s): HGB, WBC, NA, POTASSIUM, CR, A1C, PSA, URIC, B12, TSH, VITDT, SED, CRP in the last 35850 hours.  No results found for: [id number]  Lab Results   Component Value Date    CHOL 179 04/12/2017     New orders: No orders of the defined types were placed in this encounter.      Independent review of:  Supplemental history by:  Judson    Patient would like to receive their AVS by Amisha Tovar MD  [facility]    Phone Start Time: 3:15 PM  Phone End time:  3:48 PM  Conversation plus orders: 33 minutes  Dictation time:  3 minutes    The visit lasted a total of 36 minutes

## 2022-09-27 ENCOUNTER — TELEPHONE (OUTPATIENT)
Dept: INTERNAL MEDICINE | Facility: CLINIC | Age: 82
End: 2022-09-27

## 2022-09-27 NOTE — TELEPHONE ENCOUNTER
Central Prior Authorization Team   Phone: [phone]    PA Initiation    Medication: Rivastigmine 4.6MG/24HR Patches  Insurance Company: [employer] - Phone [phone] Fax [phone]  Pharmacy Filling the Rx: [id number] [place], [place] - [address]  Filling Pharmacy Phone: [phone]  Filling Pharmacy Fax:    Start Date: 9/27/2022

## 2022-09-29 NOTE — TELEPHONE ENCOUNTER
Prior Authorization Approval    Authorization Effective Date: 1/1/2022  Authorization Expiration Date: 12/31/2022  Medication: Rivastigmine 4.6MG/24HR Patches  Approved Dose/Quantity:    Reference #:     Insurance Company: [employer] - Phone [phone] Fax [phone]  Expected CoPay:       CoPay Card Available:      Foundation Assistance Needed:    Which Pharmacy is filling the prescription (Not needed for infusion/clinic administered): [id number] - [place], [place] - [address] [place], [place] [address] [facility] [address] [facility]  Pharmacy Notified: Yes  Patient Notified: Yes

## 2022-10-19 ENCOUNTER — TELEPHONE (OUTPATIENT)
Dept: BEHAVIORAL HEALTH | Facility: CLINIC | Age: 82
End: 2022-10-19

## 2022-10-19 ENCOUNTER — TELEPHONE (OUTPATIENT)
Dept: INTERNAL MEDICINE | Facility: CLINIC | Age: 82
End: 2022-10-19

## 2022-10-19 NOTE — TELEPHONE ENCOUNTER
General Call      Reason for Call:   Judson calling regarding his wife Marley - she has Alzeimers  Has further concerns    Please advise    What are your questions or concerns:  n/a    Date of last appointment with provider: n/a    Could we send this information to you in [place] or would you prefer to receive a phone call?:   Patient would prefer a phone call   Okay to leave a detailed message?: Yes at Other phone number:  [phone]

## 2022-10-23 ENCOUNTER — HEALTH MAINTENANCE LETTER (OUTPATIENT)
Age: 82
End: 2022-10-23

## 2022-10-25 ENCOUNTER — VIRTUAL VISIT (OUTPATIENT)
Dept: PSYCHOLOGY | Facility: CLINIC | Age: 82
End: 2022-10-25
Attending: INTERNAL MEDICINE
Payer: MEDICARE

## 2022-10-25 DIAGNOSIS — F41.1 GAD (GENERALIZED ANXIETY DISORDER): ICD-10-CM

## 2022-10-25 PROCEDURE — 90791 PSYCH DIAGNOSTIC EVALUATION: CPT | Mod: 95

## 2022-10-25 ASSESSMENT — COLUMBIA-SUICIDE SEVERITY RATING SCALE - C-SSRS
3. HAVE YOU BEEN THINKING ABOUT HOW YOU MIGHT KILL YOURSELF?: NO
6. HAVE YOU EVER DONE ANYTHING, STARTED TO DO ANYTHING, OR PREPARED TO DO ANYTHING TO END YOUR LIFE?: NO
4. HAVE YOU HAD THESE THOUGHTS AND HAD SOME INTENTION OF ACTING ON THEM?: NO
2. HAVE YOU ACTUALLY HAD ANY THOUGHTS OF KILLING YOURSELF IN THE PAST MONTH?: NO
1. IN THE PAST MONTH, HAVE YOU WISHED YOU WERE DEAD OR WISHED YOU COULD GO TO SLEEP AND NOT WAKE UP?: NO
5. HAVE YOU STARTED TO WORK OUT OR WORKED OUT THE DETAILS OF HOW TO KILL YOURSELF? DO YOU INTEND TO CARRY OUT THIS PLAN?: NO

## 2022-10-25 NOTE — PROGRESS NOTES
"[facility]  Provider Name:  Minnie Dianecarmenon     Credentials:  MSW LICSW    PATIENT'S NAME: Marley Andrade  PREFERRED NAME: Marley  PRONOUNS:       MRN: [id number]  : 1940  ADDRESS: [address] [place] [address]  ACCT. NUMBER:  [id number]  DATE OF SERVICE: 10/25/22  START TIME: 11:00 a,m  END TIME: 12:00 pm  PREFERRED PHONE: [phone]  May we leave a program related message: Yes  SERVICE MODALITY:  Video Visit:      Provider verified identity through the following two step process.  Patient provided:  Patient address   The patient has been notified of the following:      \"We have found that certain health care needs can be provided without the need for a face to face visit.  This service lets us provide the care you need with a phone conversation.       I will have full access to your [place] medical record during this entire phone call.   I will be taking notes for your medical record.      Since this is like an office visit, we will bill your insurance company for this service.       There are potential benefits and risks of telephone visits (e.g. limits to patient confidentiality) that differ from in-person visits.?  Confidentiality still applies for telephone services, and nobody will record the visit.  It is important to be in a quiet, private space that is free of distractions (including cell phone or other devices) during the visit.??      If during the course of the call I believe a telephone visit is not appropriate, you will not be charged for this service\"     Consent has been obtained for this service by care team member: Yes     As the provider I attest to compliance with applicable laws and regulations related to telemedicine.    UNIVERSAL ADULT Mental Health DIAGNOSTIC ASSESSMENT    Identifying Information:  Patient is a 82 year old,  individual.    Patient was referred for an assessment by primary care providerfamily.  Patient attended " "the session with  Judson.    Chief Complaint:   The reason for seeking services at this time is: \"Alzheimer s\". Alzheimer's diagnosis 3 or 4 years ago, experiencing anxiety and hallucinations (dream state), paranoia. Preparing to move to [facility] in early December.     The problem(s) began 06/01/22.    Patient has not attempted to resolve these concerns in the past.    Social/Family History:  Patient reported they grew up in [place]  .  They were raised by biological parents  .  Parents were always together.  Patient reported that their childhood was raised in [employer]/family, incredible family, comfortable family, parents got along, 9 out of 10.  Patient described their current relationships with family of origin as fine relationships with 3 siblings, 1 sister, 2 brothers-patient is the oldest. They live out of state, except one brother.    The patient describes their cultural background as .  Cultural influences and impact on patient's life structure, values, norms, and healthcare: None.  Contextual influences on patient's health include: Family Factors supportive family and Learning Environment Factors education-achievement .  These factors will be addressed in the Preliminary Treatment plan. Patient identified their preferred language to be English. Patient reported they does not need the assistance of an  or other support involved in therapy.     Patient reported had no significant delays in developmental tasks.   Patient's highest education level was college graduate  . 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  Patient identified the following learning problems: none reported.  Modifications will not be used to assist communication in therapy.  Patient reports they are  able to understand written materials.    Patient reported the following relationship history.  Patient's current relationship status is  for 59 years.   Patient identified their sexual " orientation as heterosexual.  Patient reported having 2 child(luisana). Patient identified spouse as part of their support system.  Patient identified the quality of these relationships as stable and meaningful,  .      Patient's current living/housing situation involves staying in own home/apartment.  The immediate members of family and household include  Judson and they report that housing is stable.  Planning to move to [facility] on December 1.   has Parkinson's disease.     Patient is currently retired.  Patient reports their finances are obtained through spouse. Patient does not identify finances as a current stressor.      Patient reported that they have not been involved with the legal system.    Patient does not report being under probation/ parole/ jurisdiction. They are not under any current court jurisdiction. .    Patient's Strengths and Limitations:  Patient identified the following strengths or resources that will help them succeed in treatment: commitment to health and well being, exercise routine, friends / good social support, family support, insight, intelligence, motivation, sense of humor and strong social skills. Things that may interfere with the patient's success in treatment include: none identified.     Assessments:  The following assessments were completed by patient for this visit:  PHQ2:   PHQ-2 ( 1999 [employer]) 10/24/2022   Q1: Little interest or pleasure in doing things 0   Q2: Feeling down, depressed or hopeless 0   PHQ-2 Score 0   Q1: Little interest or pleasure in doing things Not at all   Q2: Feeling down, depressed or hopeless Not at all   PHQ-2 Score 0     PHQ9:   PHQ-9 SCORE 4/28/2020   PHQ-9 Total Score 1     GAD2:   CATHY-2 10/25/2022   Feeling nervous, anxious, or on edge 2   Not being able to stop or control worrying 2   CATHY-2 Total Score 4     GAD7:   CATHY-7 SCORE 11/9/2021   Total Score 2     CAGE-AID: No flowsheet data  found.  PROMIS 10-Global Health (only subscores and total score): No flowsheet data found.  [place] Suicide Severity Rating Scale (Lifetime/Recent)  [place] Suicide Severity Rating (Lifetime/Recent) 10/25/2022   Q1 Wished to be Dead (Past Month) no   Q2 Suicidal Thoughts (Past Month) no   Q3 Suicidal Thought Method no   Q4 Suicidal Intent without Specific Plan no   Q5 Suicide Intent with Specific Plan no   Q6 Suicide Behavior (Lifetime) no   Level of Risk per Screen low risk       Personal and Family Medical History:  Patient does not report a family history of mental health concerns.  Patient reports family history is not on file..     Patient does not report Mental Health Diagnosis or Treatment.      Patient has had a physical exam to rule out medical causes for current symptoms.  Date of last physical exam was within the past year. Client was encouraged to follow up with PCP if symptoms were to develop. The patient has a [facility] Primary [facility] Provider, who is named Aries Tovar.  Patient reports no current medical concerns.  Patient denies any issues with pain..   There are not significant appetite / nutritional concerns / weight changes.   Patient does not report a history of head injury / trauma / cognitive impairment.      Current Outpatient Medications   Medication     betamethasone dipropionate (DIPROSONE) 0.05 % external ointment     cholecalciferol, vitamin D3, 2,000 unit Tab     eszopiclone (LUNESTA) 2 MG tablet     PARoxetine (PAXIL) 10 MG tablet     risperiDONE (RISPERDAL) 0.25 MG tablet     rivastigmine (EXELON) 4.6 MG/24HR 24 hr patch     No current facility-administered medications for this visit.        Medication Adherence:  Patient reports taking.      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     Hx antineoplastic chemotherapy      Osteoporosis          Current Mental Status Exam:   Appearance:  phone    Eye Contact:  phone   Psychomotor:  phone       Gait / station:  phone  Attitude / Demeanor: Cooperative   Speech      Rate / Production: Normal/ Responsive      Volume:  Normal  volume      Language:  intact  Mood:   Anxious   Affect:   Appropriate    Thought Content: Perservative   Thought Process: Tangential       Associations: Flight of ideas  Insight:   Poor   Judgment:  Impaired   Orientation:  All  Attention/concentration: Limited      Substance Use:  Patient did report a family history of substance use concerns; see medical history section for details.  Patient has not received chemical dependency treatment in the past.  Patient has not ever been to detox.      Patient is not currently receiving any chemical dependency treatment.           Substance History of use Age of first use Date of last use     Pattern and duration of use (include amounts and frequency)   Alcohol currently use   19 10/14/22 REPORTS SUBSTANCE USE: reports using substance 3 times per month and has 1 wine at a time.   Patient reports heaviest use is current use.   Cannabis   never used     REPORTS SUBSTANCE USE: N/A     Amphetamines   never used     REPORTS SUBSTANCE USE: N/A   Cocaine/crack    never used       REPORTS SUBSTANCE USE: N/A   Hallucinogens never used         REPORTS SUBSTANCE USE: N/A   Inhalants never used         REPORTS SUBSTANCE USE: N/A   Heroin never used         REPORTS SUBSTANCE USE: N/A   Other Opiates never used     REPORTS SUBSTANCE USE: N/A   Benzodiazepine   never used     REPORTS SUBSTANCE USE: N/A   Barbiturates never used     REPORTS SUBSTANCE USE: N/A   Over the counter meds never used     REPORTS SUBSTANCE USE:  N/A   Caffeine currently use 19   REPORTS SUBSTANCE USE: reports using substance 3 times per day and has 1 cup at a time.   Patient reports heaviest use is current use.   Nicotine  never used     REPORTS SUBSTANCE USE: N/A   Other substances not listed above:  Identify:  never used     REPORTS SUBSTANCE USE: N/A     Patient reported the following problems as a result of their substance use: no problems, not applicable.    Substance Use: No symptoms    Based on the negative CAGE score and clinical interview there  are not indications of drug or alcohol abuse.      Significant Losses / Trauma / Abuse / Neglect Issues:   Patient did not  serve in the .  There are indications or report of significant loss, trauma, abuse or neglect issues related to: oldest son went through drug and alcohol treatment at age 18 .  Concerns for possible neglect are not present.     Safety Assessment:   Patient denies current homicidal ideation and behaviors.  Patient denies current self-injurious ideation and behaviors.    Patient denied risk behaviors associated with substance use.  Patient denies any high risk behaviors associated with mental health symptoms.  Patient reports the following current concerns for their personal safety: None.  Patient reports there are not firearms in the house.       There are no firearms in the home..    History of Safety Concerns:  Patient denied a history of homicidal ideation.     Patient denied a history of personal safety concerns.    Patient denied a history of assaultive behaviors.    Patient denied a history of sexual assault behaviors.     Patient denied a history of risk behaviors associated with substance use.  Patient denies any history of high risk behaviors associated with mental health symptoms.  Patient reports the following protective factors: safe and stable environment; regular sleep; regular physical activity; sense of belonging; purpose; adherence with prescribed medication;  living with other people; effective problem solving skills; access to a variety of clinical interventions and pets    Risk Plan:  See Recommendations for Safety and Risk Management Plan    Review of Symptoms per patient report:  Depression: Change in sleep and Difficulties concentrating  Prisca:  No Symptoms  Psychosis: No Symptoms  Anxiety: Excessive worry, Nervousness, Physical complaints, such as headaches, stomachaches, muscle tension, Irritability and Anger outbursts  Panic:  No symptoms  Post Traumatic Stress Disorder:  No Symptoms   Eating Disorder: No Symptoms  ADD / ADHD:  No symptoms  Conduct Disorder: No symptoms  Autism Spectrum Disorder: No symptoms  Obsessive Compulsive Disorder: No Symptoms    Patient reports the following compulsive behaviors and treatment history: none.      Diagnostic Criteria:   Generalized Anxiety Disorder  A. Excessive anxiety and worry about a number of events or activities (such as work or school performance).   B. The person finds it difficult to control the worry.  C. Select 3 or more symptoms (required for diagnosis). Only one item is required in children.   - Restlessness or feeling keyed up or on edge.    - Being easily fatigued.    - Difficulty concentrating or mind going blank.    - Irritability.    - Sleep disturbance (difficulty falling or staying asleep, or restless unsatisfying sleep).   D. The focus of the anxiety and worry is not confined to features of an Axis I disorder.  E. The anxiety, worry, or physical symptoms cause clinically significant distress or impairment in social, occupational, or other important areas of functioning.   F. The disturbance is not due to the direct physiological effects of a substance (e.g., a drug of abuse, a medication) or a general medical condition (e.g., hyperthyroidism) and does not occur exclusively during a Mood Disorder, a Psychotic Disorder, or a Pervasive Developmental Disorder.      Functional Status:  Patient reports the  following functional impairments:  chronic disease management, home life with , relationship(s) and social interactions.     Nonprogrammatic care:  Patient is requesting basic services to address current mental health concerns.    Clinical Summary:  1. Reason for assessment: Alzheimers, managing anxiety and sx  .  2. Psychosocial, Cultural and Contextual Factors: Long healthy life, distress over current dx  .  3. Principal DSM5 Diagnoses  (Sustained by DSM5 Criteria Listed Above):   Adjustment Disorders  309.28 (F43.23) With mixed anxiety and depressed mood.  4. Other Diagnoses that is relevant to services:  Alzheimer's   5. Provisional Diagnosis:  Adjustment Disorders  309.28 (F43.23) With mixed anxiety and depressed mood as evidenced by ROS .  6. Prognosis: Relieve Acute Symptoms.  7. Likely consequences of symptoms if not treated: continued struggle.  8. Client strengths include:  insightful, intelligent, motivated and willing to ask questions .     Recommendations:     1. Plan for Safety and Risk Management:   Safety and Risk: Recommended that patient call 911 or go to the local ED should there be a change in any of these risk factors..          Report to child / adult protection services was NA.     2. Patient's identified struggle with Alzheimer's diagnosis.     3. Initial Treatment will focus on:    Anxiety - Alzheimer's dx.     4. Resources/Service Plan:    services are not indicated.   Modifications to assist communication are not indicated.   Additional disability accommodations are not indicated.      5. Collaboration:   Collaboration / coordination of treatment will be initiated with the following  support professionals: primary care physician.      6.  Referrals:   The following referral(s) will be initiated: potential Alzheimer's support group. Next Scheduled Appointment: 10/31/22.     A Release of Information has been obtained for the following: none.     Emergency Contact   was obtained.     7. CINTHYA:    CINTHYA:  Discussed the general effects of drugs and alcohol on health and well-being. Provider gave patient printed information about the effects of chemical use on their health and well being. Recommendations:  None .     8. Records:   These were reviewed at time of assessment.   Information in this assessment was obtained from the medical record and  provided by patient and  who is a fair historian.    Patient will have open access to their mental health medical record.        Provider Name/ Credentials:  Minnie GANT LICSW  October 25, 2022

## 2022-10-31 ENCOUNTER — VIRTUAL VISIT (OUTPATIENT)
Dept: PSYCHOLOGY | Facility: CLINIC | Age: 82
End: 2022-10-31
Payer: MEDICARE

## 2022-10-31 DIAGNOSIS — F41.1 GAD (GENERALIZED ANXIETY DISORDER): Primary | ICD-10-CM

## 2022-10-31 PROCEDURE — 90834 PSYTX W PT 45 MINUTES: CPT | Mod: 95

## 2022-10-31 NOTE — PROGRESS NOTES
"    [facility]                                     Progress Note    Patient Name: Marley Andrade  Date: 10/31/22      Service Type: Individual      Session Start Time: 4:10 pm  Session End Time: 4:50 pm     Session Length: 40 min    Session #: 2    Attendees: Client and Spouse / Significant Other    Service Modality:  Phone Visit:      Provider verified identity through the following two step process.  Patient provided:  Patient is known previously to provider    The patient has been notified of the following:      \"We have found that certain health care needs can be provided without the need for a face to face visit.  This service lets us provide the care you need with a phone conversation.       I will have full access to your [facility] medical record during this entire phone call.   I will be taking notes for your medical record.      Since this is like an office visit, we will bill your insurance company for this service.       There are potential benefits and risks of telephone visits (e.g. limits to patient confidentiality) that differ from in-person visits.?Confidentiality still applies for telephone services, and nobody will record the visit.  It is important to be in a quiet, private space that is free of distractions (including cell phone or other devices) during the visit.??      If during the course of the call I believe a telephone visit is not appropriate, you will not be charged for this service\"     Consent has been obtained for this service by care team member: Yes     DATA  Interactive Complexity: No  Crisis: No        Progress Since Last Session (Related to Symptoms / Goals / Homework):   Symptoms: No change continuing distress regarding Alzheimer's diagnosis    Homework: Did not complete      Episode of Care Goals: No improvement - PRECONTEMPLATION (Not seeing need for change); Intervened by educating the patient about the effects of current behavior on health.  Evoked " information about reasons to continue behavior, express concern / recommendations, and explored any change talk     Current / Ongoing Stressors and Concerns:   Coping with Alzheimer's and short term memory loss-distress     Treatment Objective(s) Addressed in This Session:     Radical acceptance     Intervention:   DBT: Provided psychoeducation on radical acceptance and applied to current situations and struggles, and the silver linings in Marley's current level of functioning. Provided active listening and validation    Assessments completed prior to visit:  The following assessments were completed by patient for this visit:  PHQ2:   PHQ-2 ( 1999 [employer]) 10/24/2022   Q1: Little interest or pleasure in doing things 0   Q2: Feeling down, depressed or hopeless 0   PHQ-2 Score 0   Q1: Little interest or pleasure in doing things Not at all   Q2: Feeling down, depressed or hopeless Not at all   PHQ-2 Score 0     PHQ9:   PHQ-9 SCORE 4/28/2020   PHQ-9 Total Score 1     GAD2:   CATHY-2 10/25/2022   Feeling nervous, anxious, or on edge 2   Not being able to stop or control worrying 2   CATHY-2 Total Score 4     GAD7:   CATHY-7 SCORE 11/9/2021   Total Score 2     CAGE-AID: No flowsheet data found.  [place] Suicide Severity Rating Scale (Lifetime/Recent)  [place] Suicide Severity Rating (Lifetime/Recent) 10/25/2022   Q1 Wished to be Dead (Past Month) no   Q2 Suicidal Thoughts (Past Month) no   Q3 Suicidal Thought Method no   Q4 Suicidal Intent without Specific Plan no   Q5 Suicide Intent with Specific Plan no   Q6 Suicide Behavior (Lifetime) no   Level of Risk per Screen low risk         ASSESSMENT: Current Emotional / Mental Status (status of significant symptoms):   Risk status (Self / Other harm or suicidal ideation)   Patient denies current fears or concerns for personal safety.   Patient denies current or recent suicidal ideation or behaviors.   Patient denies current or recent homicidal ideation or behaviors.   Patient denies  current or recent self injurious behavior or ideation.   Patient denies other safety concerns.   Patient reports there has been no change in risk factors since their last session.     Patient reports there has been no change in protective factors since their last session.     Recommended that patient call 911 or go to the local ED should there be a change in any of these risk factors.     Appearance:   phone-could not assess    Eye Contact:   phone-could not assess    Psychomotor Behavior: phone-could not assess    Attitude:   Cooperative  Friendly Pleasant   Orientation:   All   Speech    Rate / Production: Normal     Volume:  Normal    Mood:    Anxious  Normal   Affect:    Appropriate    Thought Content:  Clear  Referential Thinking    Thought Form:  Coherent  Logical    Insight:    Fair      Medication Review:   No changes to current psychiatric medication(s)     Medication Compliance:   Yes     Changes in Health Issues:   None reported     Chemical Use Review:   Substance Use: Chemical use reviewed, no active concerns identified      Tobacco Use: No current tobacco use.      Diagnosis:  1. CATHY (generalized anxiety disorder)        Collateral Reports Completed:   Routed note to PCP    PLAN: (Patient Tasks / Therapist Tasks / Other)  Use radical acceptance        EVELIO Lovelace  10/31/22

## 2022-11-14 ENCOUNTER — TRANSFERRED RECORDS (OUTPATIENT)
Dept: HEALTH INFORMATION MANAGEMENT | Facility: CLINIC | Age: 82
End: 2022-11-14

## 2022-11-28 ENCOUNTER — VIRTUAL VISIT (OUTPATIENT)
Dept: PSYCHOLOGY | Facility: CLINIC | Age: 82
End: 2022-11-28
Payer: MEDICARE

## 2022-11-28 DIAGNOSIS — F41.1 GAD (GENERALIZED ANXIETY DISORDER): Primary | ICD-10-CM

## 2022-11-28 PROCEDURE — 90834 PSYTX W PT 45 MINUTES: CPT | Mod: 95

## 2022-11-28 NOTE — PROGRESS NOTES
"    [facility]                                     Progress Note    Patient Name: Marley Andrade  Date: 11/28/22      Service Type: Individual      Session Start Time: 2:00 pm  Session End Time: 2:40 pm     Session Length: 40 min    Session #: 3    Attendees: Client and Spouse / Significant Other    Service Modality:  Phone Visit:      Provider verified identity through the following two step process.  Patient provided:  Patient is known previously to provider    The patient has been notified of the following:      \"We have found that certain health care needs can be provided without the need for a face to face visit.  This service lets us provide the care you need with a phone conversation.       I will have full access to your [facility] medical record during this entire phone call.   I will be taking notes for your medical record.      Since this is like an office visit, we will bill your insurance company for this service.       There are potential benefits and risks of telephone visits (e.g. limits to patient confidentiality) that differ from in-person visits.?Confidentiality still applies for telephone services, and nobody will record the visit.  It is important to be in a quiet, private space that is free of distractions (including cell phone or other devices) during the visit.??      If during the course of the call I believe a telephone visit is not appropriate, you will not be charged for this service\"     Consent has been obtained for this service by care team member: Yes     DATA  Interactive Complexity: No  Crisis: No        Progress Since Last Session (Related to Symptoms / Goals / Homework):   Symptoms: Improving acceptance of Alz dx    Homework: Achieved / completed to satisfaction      Episode of Care Goals: Achieved / completed to satisfaction - MAINTENANCE (Working to maintain change, with risk of relapse); Intervened by continuing to positively reinforce healthy behavior " choice      Current / Ongoing Stressors and Concerns:   Coping with Alzheimer's and short term memory loss-distress. Improving anxiety, hallucinations reduced, sleep quality     Treatment Objective(s) Addressed in This Session:     Radical acceptance of ALZ reality     Intervention:   DBT: Reviewed checking the facts and radical acceptance and applied to current situations and struggles, and the silver linings in Marley's current level of functioning. Provided active listening and validation    Assessments completed prior to visit:   10/25/22   DA  The following assessments were completed by patient for this visit:  PHQ2:   PHQ-2 ( 1999 Pf[employer]) 10/24/2022   Q1: Little interest or pleasure in doing things 0   Q2: Feeling down, depressed or hopeless 0   PHQ-2 Score 0   Q1: Little interest or pleasure in doing things Not at all   Q2: Feeling down, depressed or hopeless Not at all   PHQ-2 Score 0     PHQ9:   PHQ-9 SCORE 4/28/2020   PHQ-9 Total Score 1     GAD2:   CATHY-2 10/25/2022   Feeling nervous, anxious, or on edge 2   Not being able to stop or control worrying 2   CATHY-2 Total Score 4     GAD7:   CATHY-7 SCORE 11/9/2021   Total Score 2     CAGE-AID: No flowsheet data found.  [place] Suicide Severity Rating Scale (Lifetime/Recent)  [place] Suicide Severity Rating (Lifetime/Recent) 10/25/2022   Q1 Wished to be Dead (Past Month) no   Q2 Suicidal Thoughts (Past Month) no   Q3 Suicidal Thought Method no   Q4 Suicidal Intent without Specific Plan no   Q5 Suicide Intent with Specific Plan no   Q6 Suicide Behavior (Lifetime) no   Level of Risk per Screen low risk         ASSESSMENT: Current Emotional / Mental Status (status of significant symptoms):   Risk status (Self / Other harm or suicidal ideation)   Patient denies current fears or concerns for personal safety.   Patient denies current or recent suicidal ideation or behaviors.   Patient denies current or recent homicidal ideation or behaviors.   Patient denies current or  recent self injurious behavior or ideation.   Patient denies other safety concerns.   Patient reports there has been no change in risk factors since their last session.     Patient reports there has been no change in protective factors since their last session.     Recommended that patient call 911 or go to the local ED should there be a change in any of these risk factors.     Appearance:   phone-could not assess    Eye Contact:   phone-could not assess    Psychomotor Behavior: phone-could not assess    Attitude:   Cooperative  Friendly Pleasant   Orientation:   All   Speech    Rate / Production: Normal     Volume:  Normal    Mood:    Normal   Affect:    Appropriate    Thought Content:  Clear  Referential Thinking    Thought Form:  Coherent  Logical    Insight:    Fair      Medication Review:   No changes to current psychiatric medication(s)     Medication Compliance:   Yes     Changes in Health Issues:   None reported     Chemical Use Review:   Substance Use: Chemical use reviewed, no active concerns identified      Tobacco Use: No current tobacco use.      Diagnosis:  1. CATHY (generalized anxiety disorder)        Collateral Reports Completed:   Routed note to PCP    PLAN: (Patient Tasks / Therapist Tasks / Other)  Use radical acceptance. Continue daily exercise and routine. Schedule return visit if anxiety resurfaces.         EVELIO Lovelace  11/28/22

## 2022-12-28 DIAGNOSIS — F41.1 GAD (GENERALIZED ANXIETY DISORDER): ICD-10-CM

## 2022-12-29 ENCOUNTER — TELEPHONE (OUTPATIENT)
Dept: INTERNAL MEDICINE | Facility: CLINIC | Age: 82
End: 2022-12-29

## 2022-12-29 RX ORDER — RISPERIDONE 0.25 MG/1
TABLET ORAL
Qty: 20 TABLET | Refills: 3 | Status: SHIPPED | OUTPATIENT
Start: 2022-12-29 | End: 2023-03-10

## 2022-12-29 NOTE — TELEPHONE ENCOUNTER
Routing refill request to provider for review/approval because:  Drug not on the [facility] refill protocol     Last Written Prescription Date:  9/26/22  Last Fill Quantity: 20,  # refills: 3   Last office visit provider:  9/26/22     Requested Prescriptions   Pending Prescriptions Disp Refills     risperiDONE (RISPERDAL) 0.25 MG tablet [Pharmacy Med Name: RISPERIDONE 0.25MG TABLETS] 20 tablet 3     Sig: TAKE 1 TABLET(0.25 MG) BY MOUTH EVERY 8 HOURS AS NEEDED FOR ANXIETY       Antipsychotic Medications Failed - 12/28/2022 12:26 PM        Failed - Blood pressure under 140/90 in past 12 months     BP Readings from Last 3 Encounters:   06/29/20 102/62                 Failed - Lipid panel on file within the past 12 months     Recent Labs   Lab Test 04/12/17  1421   CHOL 179   TRIG 62   HDL 66                  Failed - CBC on file in past 12 months     Recent Labs   Lab Test 04/03/20  1000   WBC 4.9   RBC 4.54   HGB 13.8   HCT 41.5                    Failed - Heart Rate on file within past 12 months     Pulse Readings from Last 3 Encounters:   06/29/20 74               Failed - A1c or Glucose on file in past 12 months     Recent Labs   Lab Test 04/03/20  1000          Please review patients last 3 weights. If a weight gain of >10 lbs exists, you may refill the prescription once after instructing the patient to schedule an appointment within the next 30 days.    Wt Readings from Last 3 Encounters:   06/29/20 45.4 kg (100 lb)   04/30/19 46.6 kg (102 lb 11.2 oz)   04/24/19 47.3 kg (104 lb 4 oz)             Passed - Patient is 12 years of age or older        Passed - Medication is active on med list        Passed - Patient is not pregnant        Passed - No positve pregnancy test on file in past 12 months        Passed - Recent (6 mo) or future (30 days) visit within the authorizing provider's specialty     Patient had office visit in the last 6 months or has a visit in the next 30 days with authorizing  "provider or within the authorizing provider's specialty.  See \"Patient Info\" tab in inbasket, or \"Choose Columns\" in Meds & Orders section of the refill encounter.                 Irish Amanda RN 12/29/22 1:09 PM  "

## 2022-12-29 NOTE — TELEPHONE ENCOUNTER
Prior Authorization Request    1. Prior Authorization for the medication risperiDONE (RISPERDAL) 0.25 MG tablet        Requesting Provider: Aries Tovar          Pt name: Marley Andrade        Pt : 1940        Pt MRN: [id number]        Last Office Visit: 10/19/2022           Insurance: Payor: MEDICARE / Plan: MEDICARE / Product Type: Medicare /         Insurance ID Number: [unfilled]        Prior Auth Contact Phone number:     BIN#:   FRANCOISN#:         CMM KEY:

## 2022-12-30 NOTE — TELEPHONE ENCOUNTER
Central Prior Authorization Team   Phone: [phone]    PA Initiation    Medication: risperiDONE (RISPERDAL) 0.25 MG tablet  Insurance Company: [employer] - Phone [phone] Fax [phone]  Pharmacy Filling the Rx: [id number] [place], [place] - [address] AT [address] OF [address]  Filling Pharmacy Phone: [phone]  Filling Pharmacy Fax:    Start Date: 12/30/2022

## 2023-01-02 NOTE — TELEPHONE ENCOUNTER
Prior Authorization Approval    Authorization Effective Date: 12/31/2022  Authorization Expiration Date: 12/31/2023  Medication: risperiDONE (RISPERDAL) 0.25 MG tablet  Approved Dose/Quantity:    Reference #:     Insurance Company: [employer] - Phone [phone] Fax [phone]  Expected CoPay:       CoPay Card Available:      Foundation Assistance Needed:    Which Pharmacy is filling the prescription (Not needed for infusion/clinic administered): [facility] #[id number] - [place], [place] - [address] [place] [address] AT [address] [facility]  Pharmacy Notified: Yes  Patient Notified: Comment:  Pharmacy will  notifiy patient

## 2023-01-09 ENCOUNTER — TELEPHONE (OUTPATIENT)
Dept: INTERNAL MEDICINE | Facility: CLINIC | Age: 83
End: 2023-01-09
Payer: MEDICARE

## 2023-01-09 NOTE — TELEPHONE ENCOUNTER
Prior Authorization Request    1. Prior Authorization for the medication rivastigmine 4.6 mg pathch        Requesting Provider: Aries Tovar          Pt name: Marley Andrade        Pt : 1940        Pt MRN: [id number]        Last Office Visit: 2022           Insurance: Payor: MEDICARE / Plan: MEDICARE / Product Type: Medicare /         Insurance ID Number: [unfilled]        Prior Auth Contact Phone number:     BIN#:   FRANCOISN#:         CMM KEY:

## 2023-01-11 NOTE — TELEPHONE ENCOUNTER
Central Prior Authorization Team   Phone: [phone]    PA Initiation    Medication:   Insurance Company:    Pharmacy Filling the Rx: [facility] #[id number] [place], [place] - [address]  Filling Pharmacy Phone: [phone]  Filling Pharmacy Fax: [phone]  Start Date: 1/11/2023

## 2023-01-13 NOTE — TELEPHONE ENCOUNTER
PRIOR AUTHORIZATION DENIED    Medication: Rivastigmine - DENIED    Denial Date: 1/11/2023    Denial Rational: INSURANCE STATES PATIENT MUST TRY/FAIL RIVASTIGMINE CAPSULE.        Appeal Information:  IF PATIENT IS UNABLE TO TRY/FAIL ALTERNATIVE(S) PLEASE SUPPLY PA TEAM WITH A LETTER OF MEDICAL NECESSITY WITH CLINICAL REASON.

## 2023-01-13 NOTE — TELEPHONE ENCOUNTER
"Spoke with patient who stated she has \"dementia\" and would like a nurse to call and speak with her   (Judson) after 3PM today (1/13) or on Monday (1/16). Patient's  manages her medications and will know if she should try the oral form of Rivastigmine.     After reviewing epic notes, it doesn't look like oral Rivastigmine has been ordered in the past.   "

## 2023-01-18 NOTE — TELEPHONE ENCOUNTER
Called patient's spouse Judson. He became angry and hung up on me twice while trying to to ask him questions regarding his wife's medications.

## 2023-01-19 ENCOUNTER — TELEPHONE (OUTPATIENT)
Dept: INTERNAL MEDICINE | Facility: CLINIC | Age: 83
End: 2023-01-19
Payer: MEDICARE

## 2023-01-19 DIAGNOSIS — F02.80 LATE ONSET ALZHEIMER'S DISEASE WITHOUT BEHAVIORAL DISTURBANCE (H): Primary | ICD-10-CM

## 2023-01-19 DIAGNOSIS — G30.1 LATE ONSET ALZHEIMER'S DISEASE WITHOUT BEHAVIORAL DISTURBANCE (H): Primary | ICD-10-CM

## 2023-01-19 RX ORDER — RIVASTIGMINE TARTRATE 3 MG/1
3 CAPSULE ORAL 2 TIMES DAILY
Qty: 180 CAPSULE | Refills: 3 | Status: SHIPPED | OUTPATIENT
Start: 2023-01-19 | End: 2023-10-30 | Stop reason: SINTOL

## 2023-01-19 NOTE — TELEPHONE ENCOUNTER
General Call      Reason for Call:  Pt is currently on Rivastigmine Dis -4 6mg patch, but her insurance will not cover, they want her to do the capsule      Pharm:  [address] [facility] [place]    Please advise    What are your questions or concerns:  n/a    Date of last appointment with provider: n/a    Could we send this information to you in [employer] or would you prefer to receive a phone call?:   Patient would prefer a phone call   Okay to leave a detailed message?: Yes at Home number on file [phone] (home)

## 2023-01-19 NOTE — TELEPHONE ENCOUNTER
See message from 1/9, aDvid tried to call 1/18 on it,     If ok with the med change please send it in     Dorie Parker CMA ([facility])

## 2023-02-07 NOTE — TELEPHONE ENCOUNTER
Upcoming Appointment Question  When is the appointment: 04/03/2020  What is your appointment for?: Prolia injection  Who is your appointment scheduled with?: Nurse only  What is your question/concern?: Pt is canceling and rescheduling at a later date.  Pt states if Provider disagrees and advises Pt to still come in please give Pt a call.  Okay to leave a detailed message?: Yes     none

## 2023-02-15 ENCOUNTER — TRANSFERRED RECORDS (OUTPATIENT)
Dept: HEALTH INFORMATION MANAGEMENT | Facility: CLINIC | Age: 83
End: 2023-02-15

## 2023-03-09 ENCOUNTER — TELEPHONE (OUTPATIENT)
Dept: INTERNAL MEDICINE | Facility: CLINIC | Age: 83
End: 2023-03-09

## 2023-03-10 ENCOUNTER — TELEPHONE (OUTPATIENT)
Dept: INTERNAL MEDICINE | Facility: CLINIC | Age: 83
End: 2023-03-10

## 2023-03-10 ENCOUNTER — VIRTUAL VISIT (OUTPATIENT)
Dept: INTERNAL MEDICINE | Facility: CLINIC | Age: 83
End: 2023-03-10
Payer: MEDICARE

## 2023-03-10 DIAGNOSIS — I47.19 ECTOPIC ATRIAL TACHYCARDIA (H): ICD-10-CM

## 2023-03-10 DIAGNOSIS — F51.01 PRIMARY INSOMNIA: ICD-10-CM

## 2023-03-10 DIAGNOSIS — G30.1 LATE ONSET ALZHEIMER'S DISEASE WITHOUT BEHAVIORAL DISTURBANCE (H): ICD-10-CM

## 2023-03-10 DIAGNOSIS — F41.1 GAD (GENERALIZED ANXIETY DISORDER): ICD-10-CM

## 2023-03-10 DIAGNOSIS — F02.80 LATE ONSET ALZHEIMER'S DISEASE WITHOUT BEHAVIORAL DISTURBANCE (H): ICD-10-CM

## 2023-03-10 DIAGNOSIS — Z87.19 HISTORY OF EXCISION OF ZENKER'S DIVERTICULUM: ICD-10-CM

## 2023-03-10 DIAGNOSIS — Z98.890 HISTORY OF EXCISION OF ZENKER'S DIVERTICULUM: ICD-10-CM

## 2023-03-10 DIAGNOSIS — R53.83 FATIGUE, UNSPECIFIED TYPE: Primary | ICD-10-CM

## 2023-03-10 DIAGNOSIS — D64.9 ANEMIA, UNSPECIFIED TYPE: ICD-10-CM

## 2023-03-10 PROCEDURE — 99214 OFFICE O/P EST MOD 30 MIN: CPT | Mod: 95 | Performed by: INTERNAL MEDICINE

## 2023-03-10 RX ORDER — PAROXETINE 10 MG/1
5 TABLET, FILM COATED ORAL EVERY MORNING
Qty: 45 TABLET | Refills: 3 | Status: SHIPPED | OUTPATIENT
Start: 2023-03-10 | End: 2023-10-30

## 2023-03-10 RX ORDER — CYANOCOBALAMIN 1000 UG/ML
1000 INJECTION, SOLUTION INTRAMUSCULAR; SUBCUTANEOUS
Status: ACTIVE | OUTPATIENT
Start: 2023-03-11

## 2023-03-10 RX ORDER — RISPERIDONE 0.25 MG/1
TABLET ORAL
Qty: 20 TABLET | Refills: 3 | Status: SHIPPED | OUTPATIENT
Start: 2023-03-10 | End: 2023-04-20

## 2023-03-10 RX ORDER — ESZOPICLONE 2 MG/1
TABLET, FILM COATED ORAL
Qty: 30 TABLET | Refills: 5 | Status: SHIPPED | OUTPATIENT
Start: 2023-03-10 | End: 2023-09-11

## 2023-03-10 ASSESSMENT — ENCOUNTER SYMPTOMS: FATIGUE: 1

## 2023-03-10 NOTE — PATIENT INSTRUCTIONS
Update labs for fatigue    Clarify meds    Extend Paxil to 90-day prescription    Refill Lunesta     reviewed    Trial of vitamin B12 shots

## 2023-03-10 NOTE — PROGRESS NOTES
Marley is a 82 year old female being evaluated via a billable phone visit, and would like to be contacted via the following  Home number [phone] (home)    ASSESSMENT and PLAN:  1. Fatigue, unspecified type  Check labs.  Medicines stable.  Consider side effect of nightly sleeping pill  - CBC with Platelets & Differential; Future  - Comprehensive metabolic panel; Future  - Ferritin; Future  - Vitamin B12; Future  - TSH; Future  - CRP inflammation; Future  - cyanocobalamin injection 1,000 mcg    2. Primary insomnia   suggest that she is taking this nightly instead of haphazardly as she insists.  Consider possible cause.  Consider lower dose  - eszopiclone (LUNESTA) 2 MG tablet; TAKE 1 TABLET(2 MG) BY MOUTH EVERY NIGHT AS NEEDED FOR SLEEP  Dispense: 30 tablet; Refill: 5    3. Late onset Alzheimer's disease without behavioral disturbance (H)  Trial of vitamin B12 shot which we have not tried before  - cyanocobalamin injection 1,000 mcg    4. Ectopic atrial tachycardia (H)  Stable without symptoms    5. Anemia, unspecified type  Recheck with mild drop in hemoglobin after surgery  - CBC with Platelets & Differential; Future  - Ferritin; Future    6. CATHY (generalized anxiety disorder)  Review and refill in larger quantities  - risperiDONE (RISPERDAL) 0.25 MG tablet; TAKE 1 TABLET(0.25 MG) BY MOUTH EVERY 8 HOURS AS NEEDED FOR ANXIETY  Dispense: 20 tablet; Refill: 3  - PARoxetine (PAXIL) 10 MG tablet; Take 0.5 tablets (5 mg) by mouth every morning  Dispense: 45 tablet; Refill: 3    7. History of excision of Zenker's diverticulum  Reviewed [facility] notes from September.  Symptoms resolved       Patient Instructions   Update labs for fatigue    Clarify meds    Extend Paxil to 90-day prescription    Refill Lunesta     reviewed    Trial of vitamin B12 shots            Return in about 6 months (around 9/10/2023) for using a video visit.       CHIEF COMPLAINT:  Chief Complaint   Patient presents with     Fatigue      "Light head, tired , about 1 month       HISTORY OF PRESENT ILLNESS:  Marley is a 82 year old female contacting the clinic today via phone for complaints of fatigue.  History obtained with  Judson due to her dementia.  Symptoms have been present for approximately the last 4 weeks.  She reports sleeping well most nights.  No medicine changes have been made.  She feels overall fatigued and they wish blood work.  She was anemic after surgery in September.    She has been on one half Paxil for anxiety.  She takes risperidone although occasionally.  Although she indicates that she takes her Lunesta at just occasionally,  shows pill of Lunesta on December 28 and January 24 at 30 pills per time    She underwent repair of Zenker's type diverticulum of the esophagus at the [facility] in September with good symptom resolution    Fatigue  Associated symptoms include fatigue.   History of Present Illness       Reason for visit:  Fatgue    She eats 4 or more servings of fruits and vegetables daily.She consumes 0 sweetened beverage(s) daily.She exercises with enough effort to increase her heart rate 60 or more minutes per day.  She exercises with enough effort to increase her heart rate 5 days per week.   She is taking medications regularly.      REVIEW OF SYSTEMS:  Frequent nightmares for which she will take a sleeping pill    PFSH:  Social History     Social History Narrative     to  Judson    Enjoys tennis and golf    3 boys       TOBACCO USE:  History   Smoking Status     Former   Smokeless Tobacco     Never       VITALS:  There were no vitals filed for this visit.  There were no vitals taken for this visit. Estimated body mass index is 17.16 kg/m  as calculated from the following:    Height as of 6/29/20: 1.626 m (5' 4\").    Weight as of 6/29/20: 45.4 kg (100 lb).    PHYSICAL EXAM:  (observations via Phone)  Alert.  Repeats herself.  Assisted by  Judson    MEDICATIONS  Current Outpatient " Medications   Medication Sig Dispense Refill     cholecalciferol, vitamin D3, 2,000 unit Tab [CHOLECALCIFEROL, VITAMIN D3, 2,000 UNIT TAB] Take 1 tablet by mouth daily.       eszopiclone (LUNESTA) 2 MG tablet TAKE 1 TABLET(2 MG) BY MOUTH EVERY NIGHT AS NEEDED FOR SLEEP 30 tablet 5     PARoxetine (PAXIL) 10 MG tablet Take 0.5 tablets (5 mg) by mouth every morning 45 tablet 3     risperiDONE (RISPERDAL) 0.25 MG tablet TAKE 1 TABLET(0.25 MG) BY MOUTH EVERY 8 HOURS AS NEEDED FOR ANXIETY 20 tablet 3     rivastigmine (EXELON) 3 MG capsule Take 1 capsule (3 mg) by mouth 2 times daily 180 capsule 3       Notes summarized:   Labs, x-rays, cardiology, GI tests reviewed:   No results for input(s): HGB, WBC, NA, POTASSIUM, CR, A1C, PSA, URIC, B12, TSH, VITDT, SED, CRP in the last 55345 hours.  No results found for: [id number]  Lab Results   Component Value Date    CHOL 179 04/12/2017     New orders:   Orders Placed This Encounter   Procedures     Comprehensive metabolic panel     Ferritin     Vitamin B12     TSH     CRP inflammation     CBC with Platelets & Differential       Independent review of:    Patient would like to receive their AVS by Amisha Tovar MD  [facility]    Phone-Visit Details  Type of service:  Phone Visit  Patient has given verbal consent to a Phone visit?  Yes  How would you like to obtain your AVS?  Cassidyharcladeron  Will anyone else be joining your phone visit, giving supplemental history?  Yes,  Judson  Originating location (pt location): Home    Distant Location (provider location):  Off-site    Phone Start Time: 2:05 PM  Phone End time:  2:33 PM  Conversation plus orders: 28 minutes  Dictation time:  3 minutes    The visit lasted a total of 31 minutes

## 2023-03-16 ENCOUNTER — LAB (OUTPATIENT)
Dept: LAB | Facility: CLINIC | Age: 83
End: 2023-03-16
Payer: MEDICARE

## 2023-03-16 ENCOUNTER — ALLIED HEALTH/NURSE VISIT (OUTPATIENT)
Dept: FAMILY MEDICINE | Facility: CLINIC | Age: 83
End: 2023-03-16
Payer: MEDICARE

## 2023-03-16 DIAGNOSIS — D64.9 ANEMIA, UNSPECIFIED TYPE: ICD-10-CM

## 2023-03-16 DIAGNOSIS — E53.8 VITAMIN B 12 DEFICIENCY: Primary | ICD-10-CM

## 2023-03-16 DIAGNOSIS — R53.83 FATIGUE, UNSPECIFIED TYPE: ICD-10-CM

## 2023-03-16 LAB
ALBUMIN SERPL BCG-MCNC: 4.2 G/DL (ref 3.5–5.2)
ALP SERPL-CCNC: 80 U/L (ref 35–104)
ALT SERPL W P-5'-P-CCNC: 14 U/L (ref 10–35)
ANION GAP SERPL CALCULATED.3IONS-SCNC: 11 MMOL/L (ref 7–15)
AST SERPL W P-5'-P-CCNC: 22 U/L (ref 10–35)
BASOPHILS # BLD AUTO: 0.1 10E3/UL (ref 0–0.2)
BASOPHILS NFR BLD AUTO: 1 %
BILIRUB SERPL-MCNC: 0.3 MG/DL
BUN SERPL-MCNC: 12.4 MG/DL (ref 8–23)
CALCIUM SERPL-MCNC: 9.3 MG/DL (ref 8.8–10.2)
CHLORIDE SERPL-SCNC: 101 MMOL/L (ref 98–107)
CREAT SERPL-MCNC: 0.67 MG/DL (ref 0.51–0.95)
CRP SERPL-MCNC: <3 MG/L
DEPRECATED HCO3 PLAS-SCNC: 24 MMOL/L (ref 22–29)
EOSINOPHIL # BLD AUTO: 0.1 10E3/UL (ref 0–0.7)
EOSINOPHIL NFR BLD AUTO: 2 %
ERYTHROCYTE [DISTWIDTH] IN BLOOD BY AUTOMATED COUNT: 14.3 % (ref 10–15)
FERRITIN SERPL-MCNC: 60 NG/ML (ref 11–328)
GFR SERPL CREATININE-BSD FRML MDRD: 86 ML/MIN/1.73M2
GLUCOSE SERPL-MCNC: 86 MG/DL (ref 70–99)
HCT VFR BLD AUTO: 40.3 % (ref 35–47)
HGB BLD-MCNC: 13.1 G/DL (ref 11.7–15.7)
IMM GRANULOCYTES # BLD: 0 10E3/UL
IMM GRANULOCYTES NFR BLD: 0 %
LYMPHOCYTES # BLD AUTO: 0.9 10E3/UL (ref 0.8–5.3)
LYMPHOCYTES NFR BLD AUTO: 23 %
MCH RBC QN AUTO: 28.9 PG (ref 26.5–33)
MCHC RBC AUTO-ENTMCNC: 32.5 G/DL (ref 31.5–36.5)
MCV RBC AUTO: 89 FL (ref 78–100)
MONOCYTES # BLD AUTO: 0.4 10E3/UL (ref 0–1.3)
MONOCYTES NFR BLD AUTO: 10 %
NEUTROPHILS # BLD AUTO: 2.5 10E3/UL (ref 1.6–8.3)
NEUTROPHILS NFR BLD AUTO: 64 %
PLATELET # BLD AUTO: 232 10E3/UL (ref 150–450)
POTASSIUM SERPL-SCNC: 4.3 MMOL/L (ref 3.4–5.3)
PROT SERPL-MCNC: 6.6 G/DL (ref 6.4–8.3)
RBC # BLD AUTO: 4.53 10E6/UL (ref 3.8–5.2)
SODIUM SERPL-SCNC: 136 MMOL/L (ref 136–145)
TSH SERPL DL<=0.005 MIU/L-ACNC: 1.46 UIU/ML (ref 0.3–4.2)
VIT B12 SERPL-MCNC: 605 PG/ML (ref 232–1245)
WBC # BLD AUTO: 3.9 10E3/UL (ref 4–11)

## 2023-03-16 PROCEDURE — 99207 PR NO CHARGE NURSE ONLY: CPT

## 2023-03-16 PROCEDURE — 82728 ASSAY OF FERRITIN: CPT

## 2023-03-16 PROCEDURE — 82607 VITAMIN B-12: CPT

## 2023-03-16 PROCEDURE — 96372 THER/PROPH/DIAG INJ SC/IM: CPT | Performed by: INTERNAL MEDICINE

## 2023-03-16 PROCEDURE — 80050 GENERAL HEALTH PANEL: CPT

## 2023-03-16 PROCEDURE — 86140 C-REACTIVE PROTEIN: CPT

## 2023-03-16 PROCEDURE — 36415 COLL VENOUS BLD VENIPUNCTURE: CPT

## 2023-03-16 RX ADMIN — CYANOCOBALAMIN 1000 MCG: 1000 INJECTION, SOLUTION INTRAMUSCULAR; SUBCUTANEOUS at 10:42

## 2023-03-16 NOTE — PROGRESS NOTES
Clinic Administered Medication Documentation    Administrations This Visit     cyanocobalamin injection 1,000 mcg     Admin Date  03/16/2023 Action  $Given Dose  1,000 mcg Route  Intramuscular Site  Left Deltoid Administered By  Minnie Chavez MA    Ordering Provider: Aries Tovar MD    Patient Supplied?: No                  Injectable Medication Documentation    Patient was given Cyanocobalamin (B-12). Prior to medication administration, verified patients identity using patient s name and date of birth. Please see MAR and medication order for additional information. Patient instructed to remain in clinic for 15 minutes.      Was entire vial of medication used? Yes  Vial/Syringe: Single dose vial  Expiration Date:  6/30/2024  Was this medication supplied by the patient? No

## 2023-04-04 ENCOUNTER — NURSE TRIAGE (OUTPATIENT)
Dept: NURSING | Facility: CLINIC | Age: 83
End: 2023-04-04
Payer: MEDICARE

## 2023-04-04 NOTE — TELEPHONE ENCOUNTER
"Pt's  calling (with Pt on speaker phone who gives verbal C2C) with concerns about;    Pt having black stools for approx last few weeks  Formed as normal   Pt states she has been 'drinking a lot of pepto bismol  Every day for acid stomach after eating'    Pt Denies;  Fever  Extreme dizziness  Diarrhea/loose stools    According to the protocol, patient should be able to monitor/manage these symptoms at home.  Care advice given/ advised to keep a daily journal about foods that make stomach feel like she needs to take pepto bismol- then make appt with PCP to discuss. Patient verbalizes understanding and agrees with plan of care.     Kassandra Bautista RN, Nurse Advisor 9:28 AM 4/4/2023  Reason for Disposition    Black or tarry bowel movements    Stool color other than brown or tan is main concern  (no bleeding and no melena)    Unusual stool color probably from food or medicine    Additional Information    Negative: Rectal bleeding, bloody stools, or blood in stool (bowel movement)    Negative: Shock suspected (e.g., cold/pale/clammy skin, too weak to stand, low BP, rapid pulse)    Negative: Difficult to awaken or acting confused (e.g., disoriented, slurred speech)    Negative: Passed out (i.e., lost consciousness, collapsed and was not responding)    Negative: [1] Vomiting AND [2] contains red blood or black (\"coffee ground\") material  (Exception: few red streaks in vomit that only happened once)    Negative: Sounds like a life-threatening emergency to the triager    Negative: Diarrhea is main symptom    Negative: [1] Stool color is black (not dark green) AND [2] patient hasn't swallowed substance that causes black stools (e.g., Pepto-Bismol, iron pills)    Negative: Diarrhea stools (i.e., watery stools, or increase in the frequency and looseness of stools)    Negative: [1] Abdomen pain is main symptom AND [2] male    Negative: [1] Abdomen pain is main symptom AND [2] adult female    Negative: Yellow eyes " (jaundice)    Negative: Patient sounds very sick or weak to the triager    Negative: [1] Stool is light gray or whitish AND [2] unexplained    Negative: [1] Stool is mucus or pus AND [2] persists > 24 hours    Negative: [1] Abnormal color is unexplained AND [2] persists > 24 hours    Negative: [1] Suspected food (or medicine) is eliminated AND [2] abnormal color persists > 48 hours    Negative: White stools (or gray) from a barium Upper or Lower GI x-ray    Negative: Green stools    Protocols used: STOOLS - UNUSUAL COLOR-A-AH, RECTAL BLEEDING-A-AH

## 2023-04-19 DIAGNOSIS — F41.1 GAD (GENERALIZED ANXIETY DISORDER): ICD-10-CM

## 2023-04-20 RX ORDER — RISPERIDONE 0.25 MG/1
TABLET ORAL
Qty: 20 TABLET | Refills: 3 | Status: SHIPPED | OUTPATIENT
Start: 2023-04-20 | End: 2023-06-20

## 2023-04-20 NOTE — TELEPHONE ENCOUNTER
"Routing refill request to provider for review/approval because:  Labs not current:  Lipid panel  BP not in range.    Last Written Prescription Date:  3/10/23  Last Fill Quantity: 20,  # refills: 3   Last office visit provider:  3/10/23     Requested Prescriptions   Pending Prescriptions Disp Refills     risperiDONE (RISPERDAL) 0.25 MG tablet 20 tablet 3     Sig: TAKE 1 TABLET(0.25 MG) BY MOUTH EVERY 8 HOURS AS NEEDED FOR ANXIETY       Antipsychotic Medications Failed - 4/20/2023  9:05 AM        Failed - Blood pressure under 140/90 in past 12 months     BP Readings from Last 3 Encounters:   06/29/20 102/62                 Failed - Lipid panel on file within the past 12 months     Recent Labs   Lab Test 04/12/17  1421   CHOL 179   TRIG 62   HDL 66                  Failed - Heart Rate on file within past 12 months     Pulse Readings from Last 3 Encounters:   06/29/20 74               Passed - A1c or Glucose on file in past 12 months     Recent Labs   Lab Test 03/16/23  1019   GLC 86       Please review patients last 3 weights. If a weight gain of >10 lbs exists, you may refill the prescription once after instructing the patient to schedule an appointment within the next 30 days.    Wt Readings from Last 3 Encounters:   06/29/20 45.4 kg (100 lb)   04/30/19 46.6 kg (102 lb 11.2 oz)   04/24/19 47.3 kg (104 lb 4 oz)             Passed - Medication is active on med list        Passed - Patient is 18 years of age or older        Passed - Patient is not pregnant        Passed - No positve pregnancy test on file in past 12 months        Passed - Recent (6 mo) or future (30 days) visit within the authorizing provider's specialty     Patient had office visit in the last 6 months or has a visit in the next 30 days with authorizing provider or within the authorizing provider's specialty.  See \"Patient Info\" tab in inbasket, or \"Choose Columns\" in Meds & Orders section of the refill encounter.                 Jus Fisher, " CIERA 04/20/23 9:06 AM

## 2023-04-24 ENCOUNTER — VIRTUAL VISIT (OUTPATIENT)
Dept: INTERNAL MEDICINE | Facility: CLINIC | Age: 83
End: 2023-04-24
Payer: MEDICARE

## 2023-04-24 DIAGNOSIS — F02.80 LATE ONSET ALZHEIMER'S DISEASE WITHOUT BEHAVIORAL DISTURBANCE (H): ICD-10-CM

## 2023-04-24 DIAGNOSIS — G30.1 LATE ONSET ALZHEIMER'S DISEASE WITHOUT BEHAVIORAL DISTURBANCE (H): ICD-10-CM

## 2023-04-24 DIAGNOSIS — R11.0 NAUSEA: Primary | ICD-10-CM

## 2023-04-24 DIAGNOSIS — F41.1 GAD (GENERALIZED ANXIETY DISORDER): ICD-10-CM

## 2023-04-24 PROCEDURE — 99214 OFFICE O/P EST MOD 30 MIN: CPT | Mod: 95 | Performed by: INTERNAL MEDICINE

## 2023-04-24 RX ORDER — ACITRETIN 10 MG/1
10 CAPSULE ORAL DAILY
COMMUNITY
Start: 2023-02-20

## 2023-04-24 RX ORDER — CHLORHEXIDINE GLUCONATE 4 %
1 LIQUID (ML) TOPICAL DAILY
COMMUNITY

## 2023-04-24 RX ORDER — FAMOTIDINE 20 MG/1
20 TABLET, FILM COATED ORAL
Qty: 90 TABLET | Refills: 3 | Status: SHIPPED | OUTPATIENT
Start: 2023-04-24 | End: 2024-01-25

## 2023-04-24 ASSESSMENT — ENCOUNTER SYMPTOMS
FATIGUE: 1
NAUSEA: 1

## 2023-04-24 NOTE — PROGRESS NOTES
"Marley is a 82 year old who is being evaluated via a billable telephone visit.      What phone number would you like to be contacted at? [phone]  How would you like to obtain your AVS? Cassidyhart  {PROVIDER LOCATION On-site should be selected for visits conducted from your clinic location or adjoining [facility] hospital, academic office, or other nearby [facility] building. Off-site should be selected for all other provider locations, including home:904775}  Distant Location (provider location):  Off-site    {PROVIDER CHARTING PREFERENCE:128533}    Subjective   Marley is a 82 year old, presenting for the following health issues:  Fatigue (Approximately for the last month. Noticing that she fatigues quickly throughout the day. ), Nausea (Feels like she develops an aversion after a couple bites. Has been occurring for approximately a month. ), and Dizziness (Intermittent dizziness and light headedness. They are both not always present. )        4/24/2023     4:10 PM   Additional Questions   Roomed by Elaine VANG   Accompanied by Spouse     Fatigue  Associated symptoms include fatigue and nausea.   Nausea  Associated symptoms include fatigue and nausea.        {SUPERLIST (Optional):667957}  {additonal problems for provider to add (Optional):227943}      Review of Systems   Constitutional: Positive for fatigue.   Gastrointestinal: Positive for nausea.      {ROS COMP (Optional):732344}      Objective    Vitals - Patient Reported  Weight (Patient Reported): 46.3 kg (102 lb) (Tennis shoes on)  Pain Score: No Pain (0)        Physical Exam   {GENERAL APPEARANCE:50::\"healthy\",\"alert\",\"no distress\"}  PSYCH: Alert and oriented times 3; coherent speech, normal   rate and volume, able to articulate logical thoughts, able   to abstract reason, no tangential thoughts, no hallucinations   or delusions  Her affect is { :4221295::\"normal\"}  RESP: No cough, no audible wheezing, able to talk in full sentences  Remainder of exam unable to be completed " due to telephone visits    {Diagnostic Test Results (Optional):706398}    {AMBULATORY ATTESTATION (Optional):006846}        Phone call duration: *** minutes

## 2023-04-24 NOTE — PROGRESS NOTES
Marley is a 82 year old female being evaluated via a billable phone visit, and would like to be contacted via the following  Home number [phone] (home)    ASSESSMENT and PLAN:  1. Nausea  Consider acid.  Consider medicine side effect.  Labs normal.  Trial off Exelon and add famotidine  - famotidine (PEPCID) 20 MG tablet; Take 1 tablet (20 mg) by mouth daily before breakfast  Dispense: 90 tablet; Refill: 3    2. CATHY (generalized anxiety disorder)  Continue Paxil but consider possible side effect    3. Late onset Alzheimer's disease without behavioral disturbance (H)  Discontinue Exelon.  Seemed to tolerate patch better       Patient Instructions   Famotidine 20 mg daily for 1 month    Hold Exelon for 2 weeks    If no improvement consider holding acitritan    If no improvement consider stopping Paxil    Labs from last month reviewed            Return in about 4 months (around 8/24/2023) for using a video visit.            CHIEF COMPLAINT:  Chief Complaint   Patient presents with     Fatigue     Approximately for the last month. Noticing that she fatigues quickly throughout the day.      Nausea     Feels like she develops an aversion after a couple bites. Has been occurring for approximately a month.      Dizziness     Intermittent dizziness and light headedness. They are both not always present.        HISTORY OF PRESENT ILLNESS:  Marley is a 82 year old female contacting the clinic today via phone for continuing complaints.  She complains of fatigue, nausea and dizziness.  Dizziness has been intermittent for years.  Nausea and fatigue approximately 1 month.  In January Exelon patch was no longer covered and she was changed to Exelon pills.  She has been exquisitely sensitive to medication side effects over the years.  Pepto-Bismol helps her nausea, otherwise she feels like she cannot eat.  No discussion or documentation of weight loss.   does not think the Exelon helps her memory    She takes a version of  "Retin-A for skin issues which has been helpful since approximately September    Paxil was added in low-dose in September and does seem to help the anxiety    Fatigue  Associated symptoms include fatigue and nausea.   Nausea  Associated symptoms include fatigue and nausea.   History of Present Illness       Reason for visit:  Fatigue, nausea, appetite.    She eats 4 or more servings of fruits and vegetables daily.She consumes 1 sweetened beverage(s) daily.She exercises with enough effort to increase her heart rate 20 to 29 minutes per day.  She exercises with enough effort to increase her heart rate 5 days per week.   She is taking medications regularly.      REVIEW OF SYSTEMS:  No abdominal pain    PFSH:  Social History     Social History Narrative     to  Judson    Enjoys tennis and golf    3 boys       TOBACCO USE:  History   Smoking Status     Former   Smokeless Tobacco     Never       VITALS:  There were no vitals filed for this visit.  LMP  (LMP Unknown)   Breastfeeding No  Estimated body mass index is 17.16 kg/m  as calculated from the following:    Height as of 6/29/20: 1.626 m (5' 4\").    Weight as of 6/29/20: 45.4 kg (100 lb).    PHYSICAL EXAM:  (observations via Phone)  Alert.  Repeats herself    MEDICATIONS  Current Outpatient Medications   Medication Sig Dispense Refill     acitretin (SORIATANE) 10 MG CAPS capsule Take 10 mg by mouth daily       eszopiclone (LUNESTA) 2 MG tablet TAKE 1 TABLET(2 MG) BY MOUTH EVERY NIGHT AS NEEDED FOR SLEEP 30 tablet 5     famotidine (PEPCID) 20 MG tablet Take 1 tablet (20 mg) by mouth daily before breakfast 90 tablet 3     Multiple Vitamins-Minerals (WOMENS MULTIVITAMIN) TABS Take 1 tablet by mouth daily       PARoxetine (PAXIL) 10 MG tablet Take 0.5 tablets (5 mg) by mouth every morning 45 tablet 3     risperiDONE (RISPERDAL) 0.25 MG tablet TAKE 1 TABLET(0.25 MG) BY MOUTH EVERY 8 HOURS AS NEEDED FOR ANXIETY 20 tablet 3     rivastigmine (EXELON) 3 MG " capsule Take 1 capsule (3 mg) by mouth 2 times daily 180 capsule 3       Notes summarized:   Labs, x-rays, cardiology, GI tests reviewed: Labs normal mid March  Recent Labs   Lab Test 03/16/23  1019   HGB 13.1   WBC 3.9*      POTASSIUM 4.3   CR 0.67   B12 605   TSH 1.46     No results found for: [id number]  Lab Results   Component Value Date    CHOL 179 04/12/2017     New orders: No orders of the defined types were placed in this encounter.      Independent review of:    Patient would like to receive their AVS by Amisha Tovar MD  [facility]    Phone-Visit Details  Type of service:  Phone Visit  Patient has given verbal consent to a Phone visit?  Yes  How would you like to obtain your AVS?  Amisha  Will anyone else be joining your phone visit, giving supplemental history?   Judson  Originating location (pt location): Home    Distant Location (provider location):  Off-site    Phone Start Time: 4:29 PM  Phone End time:  4:55 PM  Conversation plus orders: 26 minutes  Dictation time:  3 minutes    The visit lasted a total of 27 minutes

## 2023-04-24 NOTE — PATIENT INSTRUCTIONS
Famotidine 20 mg daily for 1 month    Hold Exelon for 2 weeks    If no improvement consider holding acitritan    If no improvement consider stopping Paxil    Labs from last month reviewed

## 2023-05-08 ENCOUNTER — TELEPHONE (OUTPATIENT)
Dept: INTERNAL MEDICINE | Facility: CLINIC | Age: 83
End: 2023-05-08
Payer: MEDICARE

## 2023-05-08 NOTE — TELEPHONE ENCOUNTER
Left voicemail for patient's spouse, Judson. Requesting more information as to the nature of this request. It does not look like this was discussed at last visit per visit documentation. Per last head CT follow-up imaging was not specifically recommended either.     Has patient had an acute change that Judson is worried about? Would this request be able to be discussed with patient's neurologist / at patient's next visit?

## 2023-05-08 NOTE — TELEPHONE ENCOUNTER
Order/Referral Request    Who is requesting: Pts      Orders being requested: requesting a CT of the brain to get a baseline on where things are  Pti s also claustrophobic as well.    Reason service is needed/diagnosis: alzheimer's disease    When are orders needed by: ASAP    Has this been discussed with Provider: No    Does patient have a preference on a Group/Provider/Facility? open    Does patient have an appointment scheduled?: No    Where to send orders: Place orders within Epic    Could we send this information to you in [facility] or would you prefer to receive a phone call?:   Patient would prefer a phone call   Okay to leave a detailed message?: Yes at Home number on file [phone] (home)- Judson-

## 2023-05-11 ENCOUNTER — VIRTUAL VISIT (OUTPATIENT)
Dept: INTERNAL MEDICINE | Facility: CLINIC | Age: 83
End: 2023-05-11
Payer: MEDICARE

## 2023-05-11 DIAGNOSIS — F02.80 LATE ONSET ALZHEIMER'S DISEASE WITHOUT BEHAVIORAL DISTURBANCE (H): Primary | ICD-10-CM

## 2023-05-11 DIAGNOSIS — F41.1 GAD (GENERALIZED ANXIETY DISORDER): ICD-10-CM

## 2023-05-11 DIAGNOSIS — G30.1 LATE ONSET ALZHEIMER'S DISEASE WITHOUT BEHAVIORAL DISTURBANCE (H): Primary | ICD-10-CM

## 2023-05-11 PROCEDURE — 99214 OFFICE O/P EST MOD 30 MIN: CPT | Mod: 95 | Performed by: INTERNAL MEDICINE

## 2023-05-11 RX ORDER — RIVASTIGMINE 4.6 MG/24H
1 PATCH, EXTENDED RELEASE TRANSDERMAL DAILY
Qty: 30 PATCH | Refills: 11 | Status: SHIPPED | OUTPATIENT
Start: 2023-05-11 | End: 2023-11-15 | Stop reason: DRUGHIGH

## 2023-05-11 RX ORDER — LORAZEPAM 0.5 MG/1
0.5 TABLET ORAL EVERY 8 HOURS PRN
Qty: 60 TABLET | Refills: 1 | COMMUNITY
Start: 2023-05-11 | End: 2023-10-30

## 2023-05-11 NOTE — TELEPHONE ENCOUNTER
"Spoke with patient's  Lopez ([facility] on file) who states he would like to discuss getting a CT scan due to some \"upcoming procedures to remove amyloid from the brain.\" Writer informed Lopez that this would best be discussed in a visit with Dr Tovar or the patient's neurologist. Lopez states the patient does not have a neurologist but agrees to schedule a visit with Dr Tovar for further discussion.  "

## 2023-05-11 NOTE — PROGRESS NOTES
Marley is a 82 year old female being evaluated via a billable phone visit, and would like to be contacted via the following  Home number [phone] (home)    ASSESSMENT and PLAN:  1. Late onset Alzheimer's disease without behavioral disturbance (H)  Okay to proceed to MRI of brain to look for amyloid.  Attempt open-ended MRI and sedation if needed.  Trial of rivastigmine patch due to intolerance of all other oral options  - MR Brain w/o Contrast; Future  - rivastigmine (EXELON) 4.6 MG/24HR 24 hr patch; Place 1 patch onto the skin daily  Dispense: 30 patch; Refill: 11    2. CATHY (generalized anxiety disorder)  Confirm has lorazepam  - LORazepam (ATIVAN) 0.5 MG tablet; Take 1 tablet (0.5 mg) by mouth every 8 hours as needed for anxiety or sleep  Dispense: 60 tablet; Refill: 1    3.  Dyspepsia.  Resolved off rivastigmine     Patient Instructions   Repeat attempt for rivastigmine patch.  Side effects on previous trials of rivastigmine capsules, Aricept, Razadyne, and memantine    Open sided MRI of the brain    Lorazepam 1 mg prior to MRI-she has            Return in about 4 months (around 9/11/2023) for using a phone visit.         CHIEF COMPLAINT:  Chief Complaint   Patient presents with     office visit     Recheck Medication     Pt is with her  and she would like to discuss a referral for a CT scan.       HISTORY OF PRESENT ILLNESS:  Marley is a 82 year old female contacting the clinic today via phone for request for MRI.  And has Alzheimer's type dementia.  Previous trials of Aricept caused diarrhea, memantine caused dizziness, Exelon/rivastigmine capsules of cause nausea and weight loss as have Razadyne capsules.  Exelon patch worked well but was refused by insurance.  When we talked last month, Exelon pills were discontinued and she was placed on Pepcid with complete resolution of her nausea and weight loss     wonders whether we can get an MRI of her brain to look for amyloid and be a candidate for some  "of the newer medications available.  She has never had an MRI.  She is claustrophobic and requests open ended MRI.  She does have lorazepam from previous prescriptions    History of Present Illness       Reason for visit:  CT refferal    She eats 4 or more servings of fruits and vegetables daily.She consumes 1 sweetened beverage(s) daily.She exercises with enough effort to increase her heart rate 20 to 29 minutes per day.  She exercises with enough effort to increase her heart rate 5 days per week.   She is taking medications regularly.      REVIEW OF SYSTEMS:  Improved appetite    PFSH:  Social History     Social History Narrative     to  Judson    Enjoys tennis and golf    3 boys       TOBACCO USE:  History   Smoking Status     Former   Smokeless Tobacco     Never       VITALS:  There were no vitals filed for this visit.  LMP  (LMP Unknown)  Estimated body mass index is 17.16 kg/m  as calculated from the following:    Height as of 6/29/20: 1.626 m (5' 4\").    Weight as of 6/29/20: 45.4 kg (100 lb).    PHYSICAL EXAM:  (observations via Phone)  Alert.   Judson assisting.  Marley reports being very anxious at her declining memory and wishes to resume treatment    MEDICATIONS  Current Outpatient Medications   Medication Sig Dispense Refill     acitretin (SORIATANE) 10 MG CAPS capsule Take 10 mg by mouth daily       eszopiclone (LUNESTA) 2 MG tablet TAKE 1 TABLET(2 MG) BY MOUTH EVERY NIGHT AS NEEDED FOR SLEEP 30 tablet 5     famotidine (PEPCID) 20 MG tablet Take 1 tablet (20 mg) by mouth daily before breakfast 90 tablet 3     LORazepam (ATIVAN) 0.5 MG tablet Take 1 tablet (0.5 mg) by mouth every 8 hours as needed for anxiety or sleep 60 tablet 1     Multiple Vitamins-Minerals (WOMENS MULTIVITAMIN) TABS Take 1 tablet by mouth daily       PARoxetine (PAXIL) 10 MG tablet Take 0.5 tablets (5 mg) by mouth every morning 45 tablet 3     risperiDONE (RISPERDAL) 0.25 MG tablet TAKE 1 TABLET(0.25 MG) BY MOUTH " EVERY 8 HOURS AS NEEDED FOR ANXIETY 20 tablet 3     rivastigmine (EXELON) 3 MG capsule Take 1 capsule (3 mg) by mouth 2 times daily 180 capsule 3     rivastigmine (EXELON) 4.6 MG/24HR 24 hr patch Place 1 patch onto the skin daily 30 patch 11       Notes summarized:   Labs, x-rays, cardiology, GI tests reviewed: MRI ordered  Recent Labs   Lab Test 03/16/23  1019   HGB 13.1   WBC 3.9*      POTASSIUM 4.3   CR 0.67   B12 605   TSH 1.46     No results found for: [id number]  Lab Results   Component Value Date    CHOL 179 04/12/2017     New orders:   Orders Placed This Encounter   Procedures     MR Brain w/o Contrast       Independent review of:    Patient would like to receive their AVS by Amisha Tovar MD  [facility]    Phone-Visit Details  Type of service:  Phone Visit  Patient has given verbal consent to a Phone visit?  Yes  How would you like to obtain your AVS?  Amisha  Will anyone else be joining your phone visit, giving supplemental history?  Yes,  Judson  Originating location (pt location): Home    Distant Location (provider location):  Off-site    Phone Start Time: 10:32 AM  Phone End time:  10:47 AM  Conversation plus orders: 15 minutes  Dictation time:  3 minutes    The visit lasted a total of 18 minutes

## 2023-05-11 NOTE — PATIENT INSTRUCTIONS
Repeat attempt for rivastigmine patch.  Side effects on previous trials of rivastigmine capsules, Aricept, Razadyne, and memantine    Open sided MRI of the brain    Lorazepam 1 mg prior to MRI-she has

## 2023-05-11 NOTE — PROGRESS NOTES
"Marley is a 82 year old who is being evaluated via a billable telephone visit.      What phone number would you like to be contacted at? [phone]  How would you like to obtain your AVS? Cassidyhart  {PROVIDER LOCATION On-site should be selected for visits conducted from your clinic location or adjoining [facility] hospital, academic office, or other nearby [facility] building. Off-site should be selected for all other provider locations, including home:828857}  Distant Location (provider location):  Off-site    {PROVIDER CHARTING PREFERENCE:293158}    Subjective   Marley is a 82 year old, presenting for the following health issues:  office visit and Recheck Medication (Pt is with her  and she would like to discuss a referral for a CT scan.)        5/11/2023     9:27 AM   Additional Questions   Roomed by la   Accompanied by eunice         5/11/2023     9:27 AM   Patient Reported Additional Medications   Patient reports taking the following new medications no     HPI     Pt reports that she is having this visit with her  to talk about a request for a referral for a CT scan.  {additonal problems for provider to add (Optional):458027}      Review of Systems   {ROS COMP (Optional):025657}      Objective           Vitals:  No vitals were obtained today due to virtual visit.    Physical Exam   {GENERAL APPEARANCE:50::\"healthy\",\"alert\",\"no distress\"}  PSYCH: Alert and oriented times 3; coherent speech, normal   rate and volume, able to articulate logical thoughts, able   to abstract reason, no tangential thoughts, no hallucinations   or delusions  Her affect is { :3535341::\"normal\"}  RESP: No cough, no audible wheezing, able to talk in full sentences  Remainder of exam unable to be completed due to telephone visits    {Diagnostic Test Results (Optional):651517}    {AMBULATORY ATTESTATION (Optional):724645}        Phone call duration: *** minutes    "

## 2023-05-26 ENCOUNTER — TRANSFERRED RECORDS (OUTPATIENT)
Dept: HEALTH INFORMATION MANAGEMENT | Facility: CLINIC | Age: 83
End: 2023-05-26
Payer: MEDICARE

## 2023-06-08 ENCOUNTER — TELEPHONE (OUTPATIENT)
Dept: INTERNAL MEDICINE | Facility: CLINIC | Age: 83
End: 2023-06-08
Payer: MEDICARE

## 2023-06-08 NOTE — TELEPHONE ENCOUNTER
General Call    Contacts       Type Contact Phone/Fax    06/08/2023 12:10 PM CDT Phone (Incoming) Judson Andrade (Emergency Contact) [phone]        Reason for Call:  Results from 5/26/23 MRI from [facility]    What are your questions or concerns:  Patient would like a call from someone to go over the results of the MRI    Date of last appointment with provider: n/a    prefer to receive a phone call?:   Patient would prefer a phone call     Okay to leave a detailed message?: Yes at Home number on file [phone] (home)

## 2023-06-12 NOTE — CONFIDENTIAL NOTE
Test ordered on May 11 office visit and performed at [facility] radiology on May 26, report received in the medical records on June 7    Office note May 11 reviewed.  Test done to determine whether there was any evidence of amyloid    There is no evidence of amyloid.  There are the usual small vessel ischemic disease changes.  No evidence of any stroke, bleeding or tumor    Please mail copy if she has not received it already

## 2023-06-20 ENCOUNTER — VIRTUAL VISIT (OUTPATIENT)
Dept: INTERNAL MEDICINE | Facility: CLINIC | Age: 83
End: 2023-06-20
Payer: MEDICARE

## 2023-06-20 DIAGNOSIS — F41.1 GAD (GENERALIZED ANXIETY DISORDER): ICD-10-CM

## 2023-06-20 DIAGNOSIS — F02.80 LATE ONSET ALZHEIMER'S DISEASE WITHOUT BEHAVIORAL DISTURBANCE (H): Primary | ICD-10-CM

## 2023-06-20 DIAGNOSIS — G30.1 LATE ONSET ALZHEIMER'S DISEASE WITHOUT BEHAVIORAL DISTURBANCE (H): Primary | ICD-10-CM

## 2023-06-20 PROCEDURE — 99213 OFFICE O/P EST LOW 20 MIN: CPT | Mod: 95 | Performed by: INTERNAL MEDICINE

## 2023-06-20 RX ORDER — RISPERIDONE 0.25 MG/1
TABLET ORAL
Qty: 20 TABLET | Refills: 3 | Status: SHIPPED | OUTPATIENT
Start: 2023-06-20 | End: 2023-07-11

## 2023-06-20 NOTE — PROGRESS NOTES
Marley is a 82 year old female being evaluated via a billable phone visit, and would like to be contacted via the following  Cell phone/Mobile:   Telephone Information:   Mobile [phone]       ASSESSMENT and PLAN:  1. Late onset Alzheimer's disease without behavioral disturbance (H)  Reviewed MRI findings.  Referral to [facility] neurology and to [facility]  - Memory Clinic Referral; Future  - Adult Neurology  Referral; Future    2. CATHY (generalized anxiety disorder)  Okay to refill  - risperiDONE (RISPERDAL) 0.25 MG tablet; TAKE 1 TABLET(0.25 MG) BY MOUTH EVERY 8 HOURS AS NEEDED FOR ANXIETY  Dispense: 20 tablet; Refill: 3       Patient Instructions   Mail copy of MRI brain    Referral to memory clinic at [facility]    Referral to memory clinic at [facility]    Refill Risperdal            Return in about 4 months (around 10/20/2023) for using a phone visit.         CHIEF COMPLAINT:  Chief Complaint   Patient presents with     Results     Discuss recent MRI reports & if a Neurology referral is needed        HISTORY OF PRESENT ILLNESS:  Marley is a 82 year old female contacting the clinic today via phone for review of MRI.  This was done last month but they have not received results.  We reviewed small perivascular disease but no obvious amyloid or findings suggestive of Lewy body dementia.  She is starting to have more confusion, hallucination and behavioral issues.  We discussed referral to [facility] memory clinic or to [facility]    History of Present Illness       Reason for visit:  MRI Results    She eats 2-3 servings of fruits and vegetables daily.She consumes 0 sweetened beverage(s) daily.She exercises with enough effort to increase her heart rate 20 to 29 minutes per day.  She exercises with enough effort to increase her heart rate 3 or less days per week.   She is taking medications regularly.      REVIEW OF SYSTEMS:  Anxiety    PFSH:  Social History     Social  "History Narrative     to  Judson    Enjoys tennis and golf    3 boys       TOBACCO USE:  History   Smoking Status     Former   Smokeless Tobacco     Never       VITALS:  There were no vitals filed for this visit.  LMP  (LMP Unknown)  Estimated body mass index is 17.16 kg/m  as calculated from the following:    Height as of 6/29/20: 1.626 m (5' 4\").    Weight as of 6/29/20: 45.4 kg (100 lb).    PHYSICAL EXAM:  (observations via Phone)  Repeats questions    MEDICATIONS  Current Outpatient Medications   Medication Sig Dispense Refill     acitretin (SORIATANE) 10 MG CAPS capsule Take 10 mg by mouth daily       eszopiclone (LUNESTA) 2 MG tablet TAKE 1 TABLET(2 MG) BY MOUTH EVERY NIGHT AS NEEDED FOR SLEEP 30 tablet 5     famotidine (PEPCID) 20 MG tablet Take 1 tablet (20 mg) by mouth daily before breakfast 90 tablet 3     LORazepam (ATIVAN) 0.5 MG tablet Take 1 tablet (0.5 mg) by mouth every 8 hours as needed for anxiety or sleep 60 tablet 1     Multiple Vitamins-Minerals (WOMENS MULTIVITAMIN) TABS Take 1 tablet by mouth daily       PARoxetine (PAXIL) 10 MG tablet Take 0.5 tablets (5 mg) by mouth every morning 45 tablet 3     risperiDONE (RISPERDAL) 0.25 MG tablet TAKE 1 TABLET(0.25 MG) BY MOUTH EVERY 8 HOURS AS NEEDED FOR ANXIETY 20 tablet 3     rivastigmine (EXELON) 3 MG capsule Take 1 capsule (3 mg) by mouth 2 times daily 180 capsule 3     rivastigmine (EXELON) 4.6 MG/24HR 24 hr patch Place 1 patch onto the skin daily 30 patch 11       Notes summarized:   Labs, x-rays, cardiology, GI tests reviewed: MRI of brain last month  Recent Labs   Lab Test 03/16/23  1019   HGB 13.1   WBC 3.9*      POTASSIUM 4.3   CR 0.67   B12 605   TSH 1.46     No results found for: [id number]  Lab Results   Component Value Date    CHOL 179 04/12/2017     New orders:   Orders Placed This Encounter   Procedures     Memory Clinic Referral     Adult Neurology  Referral       Independent review of:    Patient would like " to receive their AVS by Amisha Tovar MD  [facility]    Phone-Visit Details  Type of service:  Phone Visit  Patient has given verbal consent to a Phone visit?  Yes  How would you like to obtain your AVS?  Amisha  Will anyone else be joining your phone visit, giving supplemental history?  Yes,  Judson  Originating location (pt location): Home    Distant Location (provider location):  On-Site    Phone Start Time: 4:08 PM  Phone End time:  4:28 PM  Conversation plus orders: 20 minutes  Dictation time:  3 minutes    The visit lasted a total of 23 minutes

## 2023-06-20 NOTE — PATIENT INSTRUCTIONS
Mail copy of MRI brain    Referral to memory clinic at [facility]    Referral to memory clinic at [facility]    Refill Risperdal

## 2023-07-11 DIAGNOSIS — F41.1 GAD (GENERALIZED ANXIETY DISORDER): ICD-10-CM

## 2023-07-11 RX ORDER — RISPERIDONE 0.25 MG/1
TABLET ORAL
Qty: 20 TABLET | Refills: 3 | Status: SHIPPED | OUTPATIENT
Start: 2023-07-11 | End: 2023-10-30

## 2023-07-11 NOTE — TELEPHONE ENCOUNTER
"Routing refill request to provider for review/approval because:  Labs not current:  Lipid Panel  B/P, heart rate not on file in past 12 months    Last Written Prescription Date:  6/20/23  Last Fill Quantity: 20,  # refills: 3  Last office visit provider:  6/20/23     Requested Prescriptions   Pending Prescriptions Disp Refills     risperiDONE (RISPERDAL) 0.25 MG tablet 20 tablet 3     Sig: TAKE 1 TABLET(0.25 MG) BY MOUTH EVERY 8 HOURS AS NEEDED FOR ANXIETY       Antipsychotic Medications Failed - 7/11/2023 11:47 AM        Failed - Blood pressure under 140/90 in past 12 months     BP Readings from Last 3 Encounters:   06/29/20 102/62                 Failed - Lipid panel on file within the past 12 months     Recent Labs   Lab Test 04/12/17  1421   CHOL 179   TRIG 62   HDL 66                  Failed - Heart Rate on file within past 12 months     Pulse Readings from Last 3 Encounters:   06/29/20 74               Passed - A1c or Glucose on file in past 12 months     Recent Labs   Lab Test 03/16/23  1019   GLC 86       Please review patients last 3 weights. If a weight gain of >10 lbs exists, you may refill the prescription once after instructing the patient to schedule an appointment within the next 30 days.    Wt Readings from Last 3 Encounters:   06/29/20 45.4 kg (100 lb)   04/30/19 46.6 kg (102 lb 11.2 oz)   04/24/19 47.3 kg (104 lb 4 oz)             Passed - Medication is active on med list        Passed - Patient is 18 years of age or older        Passed - Patient is not pregnant        Passed - No positve pregnancy test on file in past 12 months        Passed - Recent (6 mo) or future (30 days) visit within the authorizing provider's specialty     Patient had office visit in the last 6 months or has a visit in the next 30 days with authorizing provider or within the authorizing provider's specialty.  See \"Patient Info\" tab in inbasket, or \"Choose Columns\" in Meds & Orders section of the refill encounter.     "             Gisselle Hernandez RN 07/11/23 11:47 AM

## 2023-08-27 ENCOUNTER — HEALTH MAINTENANCE LETTER (OUTPATIENT)
Age: 83
End: 2023-08-27

## 2023-09-11 DIAGNOSIS — F51.01 PRIMARY INSOMNIA: ICD-10-CM

## 2023-09-11 RX ORDER — ESZOPICLONE 2 MG/1
TABLET, FILM COATED ORAL
Qty: 30 TABLET | Refills: 5 | Status: SHIPPED | OUTPATIENT
Start: 2023-09-11 | End: 2024-02-01

## 2023-10-17 ENCOUNTER — TELEPHONE (OUTPATIENT)
Dept: INTERNAL MEDICINE | Facility: CLINIC | Age: 83
End: 2023-10-17
Payer: MEDICARE

## 2023-10-17 NOTE — TELEPHONE ENCOUNTER
Reason for call:  Other     Patient called regarding (reason for call): call back    Additional comments: Patient is calling and asking if the provider can call the patient and go over some MRI results from 05/26/2023. Patient states they have been waiting to hear back about these results and also to have them sent to them. Please advise and call patient back please and thank you.    Phone number to reach patient:  Home number on file [phone] (home)    Best Time:  any    Can we leave a detailed message on this number?  YES

## 2023-10-18 NOTE — TELEPHONE ENCOUNTER
Relayed to patient's  Dr. Tovar will respond when he returns to clinic.   is agreeable to waiting.

## 2023-10-23 NOTE — TELEPHONE ENCOUNTER
Spoke with pt to receive consent to speak with pt's  Judson. I also informed patient that she may want to file a new consent to communicate so that we are able to speak with Judson regarding medical information. Pt stated understanding.     Judson stated understanding and acknowledged that MRI was discussed at June visit. He is requesting that a written report for the May MRI be sent to them. I informed him that we since this is a printed medical record release, pt would need to file a RICHARD for this. I advise to go through medical records or sign an RICHARD next time patient is in clinic. I also suggested that they request this information from [facility].     No further questions at the time of this phone call.

## 2023-10-30 ENCOUNTER — VIRTUAL VISIT (OUTPATIENT)
Dept: INTERNAL MEDICINE | Facility: CLINIC | Age: 83
End: 2023-10-30
Payer: MEDICARE

## 2023-10-30 DIAGNOSIS — Z00.00 PREVENTATIVE HEALTH CARE: ICD-10-CM

## 2023-10-30 DIAGNOSIS — G30.1 LATE ONSET ALZHEIMER'S DISEASE WITHOUT BEHAVIORAL DISTURBANCE (H): ICD-10-CM

## 2023-10-30 DIAGNOSIS — F02.80 LATE ONSET ALZHEIMER'S DISEASE WITHOUT BEHAVIORAL DISTURBANCE (H): ICD-10-CM

## 2023-10-30 DIAGNOSIS — F41.1 GAD (GENERALIZED ANXIETY DISORDER): Primary | ICD-10-CM

## 2023-10-30 PROCEDURE — 99443 PR PHYSICIAN TELEPHONE EVALUATION 21-30 MIN: CPT | Mod: 95 | Performed by: INTERNAL MEDICINE

## 2023-10-30 RX ORDER — RIVASTIGMINE 9.5 MG/24H
1 PATCH, EXTENDED RELEASE TRANSDERMAL DAILY
Qty: 90 PATCH | Refills: 3 | Status: SHIPPED | OUTPATIENT
Start: 2023-10-30 | End: 2024-02-21

## 2023-10-30 RX ORDER — RISPERIDONE 0.25 MG/1
TABLET ORAL
Qty: 180 TABLET | Refills: 3 | Status: SHIPPED | OUTPATIENT
Start: 2023-10-30 | End: 2023-11-15

## 2023-10-30 RX ORDER — PAROXETINE 10 MG/1
10 TABLET, FILM COATED ORAL EVERY MORNING
Qty: 90 TABLET | Refills: 3 | Status: SHIPPED | OUTPATIENT
Start: 2023-10-30 | End: 2023-11-15

## 2023-10-30 ASSESSMENT — ENCOUNTER SYMPTOMS: NERVOUS/ANXIOUS: 1

## 2023-10-30 NOTE — PROGRESS NOTES
Marley is a 83 year old female being evaluated via a billable phone visit, and would like to be contacted via the following  Cell phone/Mobile:   Telephone Information:   Mobile [phone]       ASSESSMENT and PLAN:  1. CATHY (generalized anxiety disorder)  Gentle increase in Paxil.  Schedule risk for Adderall  - PARoxetine (PAXIL) 10 MG tablet; Take 1 tablet (10 mg) by mouth every morning  Dispense: 90 tablet; Refill: 3  - risperiDONE (RISPERDAL) 0.25 MG tablet; Take 1 tablet (0.25 mg) by mouth 2 times daily. May also take 1 tablet (0.25 mg) every 8 hours as needed (anxiety).  Dispense: 180 tablet; Refill: 3    2. Late onset Alzheimer's disease without behavioral disturbance (H)  Trial of higher dose patch in staggered fashion  - rivastigmine (EXELON) 9.5 MG/24HR 24 hr patch; Place 1 patch onto the skin daily  Dispense: 90 patch; Refill: 3    3. Preventative health care  - REVIEW OF HEALTH MAINTENANCE PROTOCOL ORDERS       Patient Instructions   Increase Paxil to 10 mg daily    Increase Risperdal to 0.25 mg in the twice daily plus every 8 hours as needed    In 2 to 3 weeks if no side effects increase Exelon patch to 9.5 mg daily    Consider BuSpar    Referral to neurology and neurology appointment scheduled for next month    Send copy of MRI brain the patient    Email communication initiated            Return in about 3 months (around 1/30/2024) for using a phone visit.         CHIEF COMPLAINT:  Chief Complaint   Patient presents with    Recheck Medication    Anxiety     Patient has been having increased anxiety- crying, heart racing, stomach aches throughout the day and would like a medication for this           10/30/2023     9:00 AM   Additional Questions   Roomed by loan carpenter       HISTORY OF PRESENT ILLNESS:  Marley is a 83 year old female contacting the clinic today via phone for discussion of anxiety.  2 years ago she was placed on low-dose Paxil for anxiety with some improvement.  Over the last few months she has  "been noticing worsening anxiety and panic attacks despite continuing 5 mg of Paxil.  She takes the risk for Adderall occasionally at night with some benefit and no side effects.  Her sister who is a therapist suggested increase in medications.  She believes her primary problem is her worsening memory    She is scheduled to see a neurologist next month.  Aricept and Exelon caused gastrointestinal side effects as did oral Razadyne.  She is tolerating the oral Razadyne patch but  is not sure that it does much.  Mechanism of action discussed    History of Present Illness       Diabetes:   She presents for follow up of diabetes.    She is not checking blood glucose.         She has no concerns regarding her diabetes at this time.   She is not experiencing numbness or burning in feet, excessive thirst, blurry vision, weight changes or redness, sores or blisters on feet.           She eats 2-3 servings of fruits and vegetables daily.She consumes 0 sweetened beverage(s) daily.She exercises with enough effort to increase her heart rate 10 to 19 minutes per day.  She exercises with enough effort to increase her heart rate 3 or less days per week.   She is taking medications regularly.      REVIEW OF SYSTEMS:  Adequate sleep using Lunesta occasionally    PFSH:  Social History     Social History Narrative     to  Judson    Enjoys tennis and golf    3 boys       TOBACCO USE:  History   Smoking Status    Former   Smokeless Tobacco    Never       VITALS:  There were no vitals filed for this visit.  LMP  (LMP Unknown)  Estimated body mass index is 17.16 kg/m  as calculated from the following:    Height as of 6/29/20: 1.626 m (5' 4\").    Weight as of 6/29/20: 45.4 kg (100 lb).    PHYSICAL EXAM:  (observations via Phone)  Anxious.  Repeats herself.   clarifies    MEDICATIONS  Current Outpatient Medications   Medication Sig Dispense Refill    acitretin (SORIATANE) 10 MG CAPS capsule Take 10 mg by mouth " "daily      eszopiclone (LUNESTA) 2 MG tablet TAKE 1 TABLET(2 MG) BY MOUTH EVERY NIGHT AS NEEDED FOR SLEEP 30 tablet 5    famotidine (PEPCID) 20 MG tablet Take 1 tablet (20 mg) by mouth daily before breakfast 90 tablet 3    Multiple Vitamins-Minerals (WOMENS MULTIVITAMIN) TABS Take 1 tablet by mouth daily      PARoxetine (PAXIL) 10 MG tablet Take 1 tablet (10 mg) by mouth every morning 90 tablet 3    risperiDONE (RISPERDAL) 0.25 MG tablet Take 1 tablet (0.25 mg) by mouth 2 times daily. May also take 1 tablet (0.25 mg) every 8 hours as needed (anxiety). 180 tablet 3    rivastigmine (EXELON) 4.6 MG/24HR 24 hr patch Place 1 patch onto the skin daily 30 patch 11    rivastigmine (EXELON) 9.5 MG/24HR 24 hr patch Place 1 patch onto the skin daily 90 patch 3       Notes summarized:   Labs, x-rays, cardiology, GI tests reviewed: MRI findings reviewed from May  Recent Labs   Lab Test 03/16/23  1019   HGB 13.1   WBC 3.9*      POTASSIUM 4.3   CR 0.67   B12 605   TSH 1.46     No results found for: \"HXYPD18URI\"  Lab Results   Component Value Date    CHOL 179 04/12/2017     New orders:   Orders Placed This Encounter   Procedures    REVIEW OF HEALTH MAINTENANCE PROTOCOL ORDERS       Independent review of:    Patient would like to receive their AVS by Amisha Tovar MD  [facility]    Phone-Visit Details  Type of service:  Phone Visit  Patient has given verbal consent to a Phone visit?  Yes  How would you like to obtain your AVS?  Amisha  Will anyone else be joining your phone visit, giving supplemental history?  Yes,  Judson  Originating location (pt location): Home    Distant Location (provider location):  Off-site    Phone Start Time: 9:37 AM  Phone End time:  10:11 AM  Conversation plus orders: 34 minutes  Dictation time:  3 minutes    The visit lasted a total of 37 minutes     "

## 2023-10-30 NOTE — PROGRESS NOTES
Marley is a 83 year old who is being evaluated via a billable telephone visit.      What phone number would you like to be contacted at? [phone]  How would you like to obtain your AVS? Cassidyhart  {PROVIDER LOCATION On-site should be selected for visits conducted from your clinic location or adjoining [facility] hospital, academic office, or other nearby [facility] building. Off-site should be selected for all other provider locations, including home:960287}  Distant Location (provider location):  {virtual location provider:881190}    {PROVIDER CHARTING PREFERENCE:027157}    Subjective   Marley is a 83 year old, presenting for the following health issues:  Recheck Medication and Anxiety (Patient has been having increased anxiety- crying, heart racing, stomach aches throughout the day and would like a medication for this)      10/30/2023     9:00 AM   Additional Questions   Roomed by loan Mai    History of Present Illness       Diabetes:   She presents for follow up of diabetes.    She is not checking blood glucose.         She has no concerns regarding her diabetes at this time.   She is not experiencing numbness or burning in feet, excessive thirst, blurry vision, weight changes or redness, sores or blisters on feet.           She eats 2-3 servings of fruits and vegetables daily.She consumes 0 sweetened beverage(s) daily.She exercises with enough effort to increase her heart rate 10 to 19 minutes per day.  She exercises with enough effort to increase her heart rate 3 or less days per week.   She is taking medications regularly.       {SUPERLIST (Optional):394865}  {additonal problems for provider to add (Optional):958916}      Review of Systems   Psychiatric/Behavioral:  The patient is nervous/anxious.       {ROS COMP (Optional):752288}      Objective           Vitals:  No vitals were obtained today due to virtual visit.    Physical Exam   {GENERAL APPEARANCE:50}  PSYCH: Alert and oriented times 3; coherent speech,  normal   rate and volume, able to articulate logical thoughts, able   to abstract reason, no tangential thoughts, no hallucinations   or delusions  Her affect is { :9044566}  RESP: No cough, no audible wheezing, able to talk in full sentences  Remainder of exam unable to be completed due to telephone visits    {Diagnostic Test Results (Optional):232311}    {AMBULATORY ATTESTATION (Optional):701462}        Phone call duration: *** minutes

## 2023-10-30 NOTE — PATIENT INSTRUCTIONS
Increase Paxil to 10 mg daily    Increase Risperdal to 0.25 mg in the twice daily plus every 8 hours as needed    In 2 to 3 weeks if no side effects increase Exelon patch to 9.5 mg daily    Consider Kaur    Referral to neurology and neurology appointment scheduled for next month    Send copy of MRI brain the patient    Email communication initiated

## 2023-11-10 ENCOUNTER — TRANSFERRED RECORDS (OUTPATIENT)
Dept: HEALTH INFORMATION MANAGEMENT | Facility: CLINIC | Age: 83
End: 2023-11-10
Payer: MEDICARE

## 2023-11-15 ENCOUNTER — VIRTUAL VISIT (OUTPATIENT)
Dept: INTERNAL MEDICINE | Facility: CLINIC | Age: 83
End: 2023-11-15
Payer: MEDICARE

## 2023-11-15 DIAGNOSIS — F41.1 GAD (GENERALIZED ANXIETY DISORDER): ICD-10-CM

## 2023-11-15 DIAGNOSIS — G30.1 ALZHEIMER'S DISEASE WITH LATE ONSET (H): Primary | ICD-10-CM

## 2023-11-15 DIAGNOSIS — F02.80 ALZHEIMER'S DISEASE WITH LATE ONSET (H): Primary | ICD-10-CM

## 2023-11-15 DIAGNOSIS — K52.831 COLLAGENOUS COLITIS: ICD-10-CM

## 2023-11-15 PROCEDURE — 99214 OFFICE O/P EST MOD 30 MIN: CPT | Mod: 95 | Performed by: INTERNAL MEDICINE

## 2023-11-15 RX ORDER — PAROXETINE 20 MG/1
20 TABLET, FILM COATED ORAL EVERY MORNING
Qty: 90 TABLET | Refills: 3 | Status: SHIPPED | OUTPATIENT
Start: 2023-11-15 | End: 2024-09-25

## 2023-11-15 RX ORDER — RISPERIDONE 0.5 MG/1
TABLET ORAL
Qty: 180 TABLET | Refills: 3 | Status: SHIPPED | OUTPATIENT
Start: 2023-11-15 | End: 2024-07-05

## 2023-11-15 NOTE — PATIENT INSTRUCTIONS
Increase Paxil to 20 mg each morning    Continue Exelon patch 9.5 mg daily    Increase Risperdal to 0.5 mg twice daily +0.5 mg every 8 hours as needed    Phone number given for [facility] memory clinic with upcoming appointment

## 2023-11-15 NOTE — PROGRESS NOTES
"Marley is a 83 year old female being evaluated via a billable phone visit, and would like to be contacted via the following  Home number [phone] (home)    ASSESSMENT and PLAN:  1. Alzheimer's disease with late onset (H)  Possible loosening stools on higher dose patch.  Behavioral problems becoming slightly worse.  Push dose.  Neurology appointment upcoming    2. CATHY (generalized anxiety disorder)  Increase Paxil.  Increased risk for Adderall  - PARoxetine (PAXIL) 20 MG tablet; Take 1 tablet (20 mg) by mouth every morning  Dispense: 90 tablet; Refill: 3  - risperiDONE (RISPERDAL) 0.5 MG tablet; Take 1 tablet (0.5 mg) by mouth 2 times daily. May also take 1 tablet (0.5 mg) every 8 hours as needed (anxiety).  Dispense: 180 tablet; Refill: 3    3. Collagenous colitis  Stable but difficult to determine history       Patient Instructions   Increase Paxil to 20 mg each morning    Continue Exelon patch 9.5 mg daily    Increase Risperdal to 0.5 mg twice daily +0.5 mg every 8 hours as needed    Phone number given for [facility] memory clinic with upcoming appointment            Return in about 4 months (around 3/15/2024) for using a video visit.         CHIEF COMPLAINT:  Chief Complaint   Patient presents with    Constipation     Irregular bowel movements. Loose stools to constipation.     Urinary Problem     Incontinence    Dizziness    Memory Loss     \"See people who are not there\"           11/15/2023    12:39 PM   Additional Questions   Accompanied by Please speak with  Judson       HISTORY OF PRESENT ILLNESS:  Marley is a 83 year old female contacting the clinic today via phone for review of symptoms.  When we spoke last on October 30, Paxil was increased from 5 mg to 10 mg, and risks.  All was scheduled twice daily at 0.25 mg.  With the risk for Adderall she feels Colmer without excess sedation and the increase Paxil seems to have helped anxiety.  Despite this she is complaining of emotional lability, outbursts, and " "sometimes hallucinations.  Hallucinations occur mostly after dinner according to .  He reports that her overall lability is better on the increased Paxil.  No side effects of tremor, or sweating.  Sleeping well    And gives a disjointed description of intermittent dizziness, loose stools and urinary incontinence.  She is not certain whether these are new but her  says they are not    Razadyne patch to increase to maximum and seems to be tolerating although memory not improved per     History of Present Illness       Reason for visit:  Lightheadedness. Urinary and bowel concerns  Symptom onset:  3-7 days ago  Symptoms include:  Incontinence- contsipation and diarrhea  Symptom intensity:  Moderate  Symptom progression:  Worsening  Had these symptoms before:  No    She eats 2-3 servings of fruits and vegetables daily.She consumes 0 sweetened beverage(s) daily.She exercises with enough effort to increase her heart rate 30 to 60 minutes per day.  She exercises with enough effort to increase her heart rate 5 days per week.   She is taking medications regularly.      REVIEW OF SYSTEMS:  Sleeping well    PFSH:  Social History     Social History Narrative     to  Judson    Enjoys tennis and golf    3 boys     Has neurology appointment scheduled in a few weeks    TOBACCO USE:  History   Smoking Status    Never   Smokeless Tobacco    Never       VITALS:  There were no vitals filed for this visit.  LMP  (LMP Unknown)  Estimated body mass index is 17.16 kg/m  as calculated from the following:    Height as of 6/29/20: 1.626 m (5' 4\").    Weight as of 6/29/20: 45.4 kg (100 lb).    PHYSICAL EXAM:  (observations via Phone)  Speech fluent but sometimes disjointed.  Anxious.   assists    MEDICATIONS  Current Outpatient Medications   Medication Sig Dispense Refill    acitretin (SORIATANE) 10 MG CAPS capsule Take 10 mg by mouth daily      eszopiclone (LUNESTA) 2 MG tablet TAKE 1 TABLET(2 MG) BY " "MOUTH EVERY NIGHT AS NEEDED FOR SLEEP 30 tablet 5    famotidine (PEPCID) 20 MG tablet Take 1 tablet (20 mg) by mouth daily before breakfast 90 tablet 3    Multiple Vitamins-Minerals (WOMENS MULTIVITAMIN) TABS Take 1 tablet by mouth daily      PARoxetine (PAXIL) 20 MG tablet Take 1 tablet (20 mg) by mouth every morning 90 tablet 3    risperiDONE (RISPERDAL) 0.5 MG tablet Take 1 tablet (0.5 mg) by mouth 2 times daily. May also take 1 tablet (0.5 mg) every 8 hours as needed (anxiety). 180 tablet 3    rivastigmine (EXELON) 9.5 MG/24HR 24 hr patch Place 1 patch onto the skin daily 90 patch 3       Notes summarized: Unable to activate Angelfish message from October 30  Labs, x-rays, cardiology, GI tests reviewed:   Recent Labs   Lab Test 03/16/23  1019   HGB 13.1   WBC 3.9*      POTASSIUM 4.3   CR 0.67   B12 605   TSH 1.46     No results found for: \"[id number]\"  Lab Results   Component Value Date    CHOL 179 04/12/2017     New orders: No orders of the defined types were placed in this encounter.      Independent review of:    Patient would like to receive their AVS by Amisha Tovar MD  [facility]    Phone-Visit Details  Type of service:  Phone Visit  Patient has given verbal consent to a Phone visit?  Yes  How would you like to obtain your AVS?  Amisha  Will anyone else be joining your phone visit, giving supplemental history?  Yes,  Judson  Originating location (pt location): Home    Distant Location (provider location):  Off-site    Phone Start Time: 3:46 PM  Phone End time:  4:17 PM  Conversation plus orders:  31 minutes  Dictation time:  3 minutes    The visit lasted a total of 34 minutes     "

## 2023-11-15 NOTE — PROGRESS NOTES
"Marley is a 83 year old who is being evaluated via a billable telephone visit.      What phone number would you like to be contacted at? ***  How would you like to obtain your AVS? {AVS Preference:930097}  {PROVIDER LOCATION On-site should be selected for visits conducted from your clinic location or adjoining [facility] hospital, academic office, or other nearby [facility] building. Off-site should be selected for all other provider locations, including home:426222}  Distant Location (provider location):  {virtual location provider:305664}    {PROVIDER CHARTING PREFERENCE:792356}    Subjective   Marley is a 83 year old, presenting for the following health issues:  Constipation (Irregular bowel movements. Loose stools to constipation. ), Urinary Problem (Incontinence), Dizziness, and Memory Loss (\"See people who are not there\")        11/15/2023    12:17 PM   Additional Questions   Roomed by Elaine VANG       History of Present Illness       Reason for visit:  Lightheadedness. Urinary and bowel concerns  Symptom onset:  3-7 days ago  Symptoms include:  Incontinence- contsipation and diarrhea  Symptom intensity:  Moderate  Symptom progression:  Worsening  Had these symptoms before:  No    She eats 2-3 servings of fruits and vegetables daily.She consumes 0 sweetened beverage(s) daily.She exercises with enough effort to increase her heart rate 30 to 60 minutes per day.  She exercises with enough effort to increase her heart rate 5 days per week.   She is taking medications regularly.       {SUPERLIST (Optional):049898}  {additonal problems for provider to add (Optional):543919}      Review of Systems   {ROS COMP (Optional):135873}      Objective           Vitals:  No vitals were obtained today due to virtual visit.    Physical Exam   {GENERAL APPEARANCE:50}  PSYCH: Alert and oriented times 3; coherent speech, normal   rate and volume, able to articulate logical thoughts, able   to abstract reason, no tangential thoughts, no hallucinations "   or delusions  Her affect is { :8219180}  RESP: No cough, no audible wheezing, able to talk in full sentences  Remainder of exam unable to be completed due to telephone visits    {Diagnostic Test Results (Optional):152915}    {AMBULATORY ATTESTATION (Optional):553485}        Phone call duration: *** minutes

## 2023-11-20 ENCOUNTER — TRANSFERRED RECORDS (OUTPATIENT)
Dept: HEALTH INFORMATION MANAGEMENT | Facility: CLINIC | Age: 83
End: 2023-11-20
Payer: MEDICARE

## 2024-01-25 DIAGNOSIS — R11.0 NAUSEA: ICD-10-CM

## 2024-01-25 RX ORDER — FAMOTIDINE 20 MG/1
20 TABLET, FILM COATED ORAL
Qty: 90 TABLET | Refills: 0 | Status: SHIPPED | OUTPATIENT
Start: 2024-01-25 | End: 2024-04-24

## 2024-02-01 DIAGNOSIS — F51.01 PRIMARY INSOMNIA: ICD-10-CM

## 2024-02-01 RX ORDER — ESZOPICLONE 2 MG/1
TABLET, FILM COATED ORAL
Qty: 90 TABLET | Refills: 1 | Status: SHIPPED | OUTPATIENT
Start: 2024-02-01

## 2024-02-12 ENCOUNTER — TRANSFERRED RECORDS (OUTPATIENT)
Dept: HEALTH INFORMATION MANAGEMENT | Facility: CLINIC | Age: 84
End: 2024-02-12
Payer: MEDICARE

## 2024-03-11 ENCOUNTER — TRANSFERRED RECORDS (OUTPATIENT)
Dept: HEALTH INFORMATION MANAGEMENT | Facility: CLINIC | Age: 84
End: 2024-03-11
Payer: MEDICARE

## 2024-04-08 ENCOUNTER — TRANSFERRED RECORDS (OUTPATIENT)
Dept: HEALTH INFORMATION MANAGEMENT | Facility: CLINIC | Age: 84
End: 2024-04-08
Payer: MEDICARE

## 2024-04-15 ENCOUNTER — TRANSFERRED RECORDS (OUTPATIENT)
Dept: HEALTH INFORMATION MANAGEMENT | Facility: CLINIC | Age: 84
End: 2024-04-15
Payer: MEDICARE

## 2024-04-24 DIAGNOSIS — R11.0 NAUSEA: ICD-10-CM

## 2024-04-24 RX ORDER — FAMOTIDINE 20 MG/1
TABLET, FILM COATED ORAL
Qty: 90 TABLET | Refills: 1 | Status: SHIPPED | OUTPATIENT
Start: 2024-04-24

## 2024-06-27 ENCOUNTER — NURSE TRIAGE (OUTPATIENT)
Dept: FAMILY MEDICINE | Facility: CLINIC | Age: 84
End: 2024-06-27
Payer: MEDICARE

## 2024-06-27 ENCOUNTER — TELEPHONE (OUTPATIENT)
Dept: INTERNAL MEDICINE | Facility: CLINIC | Age: 84
End: 2024-06-27
Payer: MEDICARE

## 2024-06-27 NOTE — TELEPHONE ENCOUNTER
Reason for Call:  Appointment Request    Patient requesting this type of appt:  lightheaded worsening     Requested provider: Aries Toavr    Reason patient unable to be scheduled: Not within requested timeframe    When does patient want to be seen/preferred time: Same day    Comments: would like a telephone visit about ongoing dizziness that is worsening     Could we send this information to you in [facility] or would you prefer to receive a phone call?:   Patient would prefer a phone call   Okay to leave a detailed message?: Yes at [phone]      Call taken on 6/27/2024 at 12:34 PM by Angi Hamilton

## 2024-06-27 NOTE — TELEPHONE ENCOUNTER
Nurse Triage SBAR    Is this a 2nd Level Triage? NO    Situation:   Pt having lightheadedness for last 2 months, worsening over last 2 days. Wanting to see PCP.    Background:   Lightheadedness onset 2 months ago, calling today due to increase in frequency and severity     Pt was not lightheaded 06/26/2024 evening and overnight    Yesterday /62 HR 73    Assessment:   /79   HR 80  Afebrile    Lightheaded: pt feels weak upon standing when this happens, intermittent, pt able to ambulate    Drinking 2-4 glasses of water daily. Writer provided education on increasing fluids and electrolytes. Spouse verbalized understanding.     Denies vision changes,    Protocol Recommended Disposition:   See in Office Within 3 Days    Recommendation:   Care advice given. Education provided.    Spouse and pt declined to be seen by other provider besides PCP. Central scheduling assisted in scheduling 07/05/2024 appt with PCP. Verbalized understanding recommendation is to be seen earlier than 07/05/2024, declined.     Lucy Morales RN    Reason for Disposition   MILD dizziness (e.g., walking normally) and has NOT been evaluated by physician for this (Exception: Dizziness caused by heat exposure, sudden standing, or poor fluid intake.)    Additional Information   Negative: SEVERE difficulty breathing (e.g., struggling for each breath, speaks in single words)   Negative: Shock suspected (e.g., cold/pale/clammy skin, too weak to stand, low BP, rapid pulse)   Negative: Difficult to awaken or acting confused (e.g., disoriented, slurred speech)   Negative: Fainted, and still feels dizzy afterwards   Negative: Overdose (accidental or intentional) of medications   Negative: New neurologic deficit that is present now: * Weakness of the face, arm, or leg on one side of the body * Numbness of the face, arm, or leg on one side of the body * Loss of speech or garbled speech   Negative: Heart beating < 50 beats per minute OR > 140  beats per minute   Negative: Sounds like a life-threatening emergency to the triager   Negative: Chest pain   Negative: Rectal bleeding, bloody stool, or tarry-black stool   Negative: Vomiting is main symptom   Negative: Diarrhea is main symptom   Negative: Headache is main symptom   Negative: Heat exhaustion suspected (i.e., dehydration from heat exposure)   Negative: Patient states that they are having an anxiety or panic attack   Negative: Dizziness from low blood sugar (i.e., < 60 mg/dl or 3.5 mmol/l)   Negative: SEVERE dizziness (e.g., unable to stand, requires support to walk, feels like passing out now)   Negative: SEVERE headache or neck pain   Negative: Spinning or tilting sensation (vertigo) present now and one or more stroke risk factors (i.e., hypertension, diabetes mellitus, prior stroke/TIA, heart attack, age over 60) (Exception: Prior physician evaluation for this AND no different/worse than usual.)   Negative: Neurologic deficit that was brief (now gone), ANY of the following:* Weakness of the face, arm, or leg on one side of the body* Numbness of the face, arm, or leg on one side of the body* Loss of speech or garbled speech   Negative: Loss of vision or double vision  (Exception: Similar to previous migraines.)   Negative: Extra heartbeats, irregular heart beating, or heart is beating very fast (i.e., 'palpitations')   Negative: Difficulty breathing   Negative: Drinking very little and dehydration suspected (e.g., no urine > 12 hours, very dry mouth, very lightheaded)   Negative: Follows bleeding (e.g., stomach, rectum, vagina)  (Exception: Became dizzy from sight of small amount blood.)   Negative: Patient sounds very sick or weak to the triager   Negative: Lightheadedness (dizziness) present now, after 2 hours of rest and fluids   Negative: Spinning or tilting sensation (vertigo) present now   Negative: Fever > 103 F (39.4 C)   Negative: Fever > 100.0 F (37.8 C) and has diabetes mellitus or a  "weak immune system (e.g., HIV positive, cancer chemotherapy, organ transplant, splenectomy, chronic steroids)   Negative: MODERATE dizziness (e.g., interferes with normal activities)  (Exception: Dizziness caused by heat exposure, sudden standing, or poor fluid intake.)   Negative: Vomiting occurs with dizziness   Negative: Patient wants to be seen   Negative: Taking a medicine that could cause dizziness (e.g., blood pressure medications, diuretics)    Answer Assessment - Initial Assessment Questions  1. DESCRIPTION: \"Describe your dizziness.\"      lightheaded  2. LIGHTHEADED: \"Do you feel lightheaded?\" (e.g., somewhat faint, woozy, weak upon standing)      Weak   3. VERTIGO: \"Do you feel like either you or the room is spinning or tilting?\" (i.e. vertigo)      denies  4. SEVERITY: \"How bad is it?\"  \"Do you feel like you are going to faint?\" \"Can you stand and walk?\"    - MILD: Feels slightly dizzy, but walking normally.    - MODERATE: Feels unsteady when walking, but not falling; interferes with normal activities (e.g., school, work).    - SEVERE: Unable to walk without falling, or requires assistance to walk without falling; feels like passing out now.       mild  5. ONSET:  \"When did the dizziness begin?\"      A few months ago, worse in last few days  6. AGGRAVATING FACTORS: \"Does anything make it worse?\" (e.g., standing, change in head position)      unsure  7. HEART RATE: \"Can you tell me your heart rate?\" \"How many beats in 15 seconds?\"  (Note: not all patients can do this)        Yesterday /62 HR 73, today /79 HR 80  8. CAUSE: \"What do you think is causing the dizziness?\"      unsure  9. RECURRENT SYMPTOM: \"Have you had dizziness before?\" If Yes, ask: \"When was the last time?\" \"What happened that time?\"      For a while, worsening and now wanting to have this addressed.  10. OTHER SYMPTOMS: \"Do you have any other symptoms?\" (e.g., fever, chest pain, vomiting, diarrhea, bleeding)        Denies other " "symptoms  11. PREGNANCY: \"Is there any chance you are pregnant?\" \"When was your last menstrual period?\"        Postmenopausal    Protocols used: Dizziness-A-OH    "

## 2024-07-05 ENCOUNTER — VIRTUAL VISIT (OUTPATIENT)
Dept: INTERNAL MEDICINE | Facility: CLINIC | Age: 84
End: 2024-07-05
Payer: MEDICARE

## 2024-07-05 DIAGNOSIS — F41.1 GAD (GENERALIZED ANXIETY DISORDER): ICD-10-CM

## 2024-07-05 DIAGNOSIS — G30.9 ALZHEIMER'S DEMENTIA WITH BEHAVIORAL DISTURBANCE (H): ICD-10-CM

## 2024-07-05 DIAGNOSIS — I47.19 ECTOPIC ATRIAL TACHYCARDIA (H): ICD-10-CM

## 2024-07-05 DIAGNOSIS — F02.818 ALZHEIMER'S DEMENTIA WITH BEHAVIORAL DISTURBANCE (H): ICD-10-CM

## 2024-07-05 DIAGNOSIS — R53.83 FATIGUE, UNSPECIFIED TYPE: Primary | ICD-10-CM

## 2024-07-05 DIAGNOSIS — R42 DIZZINESS: ICD-10-CM

## 2024-07-05 DIAGNOSIS — G30.1 ALZHEIMER'S DISEASE WITH LATE ONSET (H): ICD-10-CM

## 2024-07-05 DIAGNOSIS — F02.80 ALZHEIMER'S DISEASE WITH LATE ONSET (H): ICD-10-CM

## 2024-07-05 DIAGNOSIS — Z13.1 ENCOUNTER FOR SCREENING FOR DIABETES MELLITUS: ICD-10-CM

## 2024-07-05 DIAGNOSIS — K31.9 DISEASE OF STOMACH AND DUODENUM, UNSPECIFIED: ICD-10-CM

## 2024-07-05 PROCEDURE — 99443 PR PHYSICIAN TELEPHONE EVALUATION 21-30 MIN: CPT | Mod: 93 | Performed by: INTERNAL MEDICINE

## 2024-07-05 RX ORDER — DONEPEZIL HYDROCHLORIDE 10 MG/1
10 TABLET, FILM COATED ORAL AT BEDTIME
COMMUNITY
Start: 2024-06-28 | End: 2024-09-25 | Stop reason: SINTOL

## 2024-07-05 RX ORDER — RISPERIDONE 0.5 MG/1
TABLET ORAL
Qty: 180 TABLET | Refills: 3 | Status: SHIPPED | OUTPATIENT
Start: 2024-07-05 | End: 2025-07-05

## 2024-07-05 RX ORDER — RISPERIDONE 0.25 MG/1
0.25 TABLET ORAL EVERY MORNING
Qty: 90 TABLET | Refills: 3 | Status: SHIPPED | OUTPATIENT
Start: 2024-07-05 | End: 2024-09-25

## 2024-07-05 NOTE — PROGRESS NOTES
OFFICE VISIT--Phone    Marley is a 83 year old female being evaluated via a billable phone visit, and would like to be contacted via the following  Home number [phone] (home)    ASSESSMENT and PLAN:  1. Fatigue, unspecified type  Update labs.  Trial of decreased meds  - Hemoglobin A1c; Future  - CBC with Platelets & Differential; Future  - Comprehensive metabolic panel; Future  - TSH; Future  - Ferritin; Future  - Vitamin B12; Future    2. Dizziness  Update labs.  Trial of decreased meds    3. Alzheimer's disease with late onset (H)  Successfully tolerating donepezil where she was unable to do so for me    4. CATHY (generalized anxiety disorder)  Trial of decreased meds  - risperiDONE (RISPERDAL) 0.5 MG tablet; Take 1 tablet (0.5 mg) by mouth at bedtime. May also take 1 tablet (0.5 mg) every 8 hours as needed (anxiety).  Dispense: 180 tablet; Refill: 3  - risperiDONE (RISPERDAL) 0.25 MG tablet; Take 1 tablet (0.25 mg) by mouth every morning  Dispense: 90 tablet; Refill: 3    5. Ectopic atrial tachycardia (H24)  Stable although consider progression to atrial fibrillation    6. Alzheimer's dementia with behavioral disturbance (H)  As above    7. Encounter for screening for diabetes mellitus  - Hemoglobin A1c; Future    8. Disease of stomach and duodenum, unspecified  - Ferritin; Future     Patient Instructions   Decrease morning Risperdal to 0.25 mg    Continue evening Risperdal 0.5 mg    Trial without Paxil noted and discussed    Update labs for fatigue and dizziness    Monitor blood pressure for fatigue and dizziness            Return in about 6 months (around 1/5/2025) for using a video visit.       CHIEF COMPLAINT:  Chief Complaint   Patient presents with    office visit    Recheck Medication     Discuss feeling light headed all the time for months.       HISTORY OF PRESENT ILLNESS:  Marley is a 83 year old female contacting the clinic today via phone for complaints of fatigue.  She reports more overwhelming fatigue.  " This persisted despite accidentally stopping Paxil for a week and was present before recent addition of donepezil    Since her last visit she has seen Dr. Con Rausch and initiated on donepezil on May 29.  She seems to be tolerating this.  When we tried this in 2019 she had overwhelming nausea.    When we last spoke in November Paxil and risk for Adderall doses were increased.  She was continued on Exelon which was then changed to donepezil.  Anxiety seems to be very well-controlled    Severe constant dizziness.  No blood pressures to check but do not think it matters    History of Present Illness       Reason for visit:  Lightheadness and tiredness  Symptom onset:  More than a month  Symptoms include:  Lightheadness and tiredness  Symptom intensity:  Moderate  Symptom progression:  Worsening  Had these symptoms before:  Yes  Has tried/received treatment for these symptoms:  No  What makes it worse:  No  What makes it better:  No    She eats 2-3 servings of fruits and vegetables daily.She consumes 0 sweetened beverage(s) daily.She exercises with enough effort to increase her heart rate 20 to 29 minutes per day.  She exercises with enough effort to increase her heart rate 4 days per week.   She is taking medications regularly.      REVIEW OF SYSTEMS:  Stable weight    Today's PHQ-2 Score:       7/5/2024    10:51 AM   PHQ-2 ( 1999 Pfizer)   Q1: Little interest or pleasure in doing things 0   Q2: Feeling down, depressed or hopeless 0   PHQ-2 Score 0       PFSH:  Social History     Social History Narrative     to  Judson    Enjoys tennis and golf    3 boys       TOBACCO USE:  History   Smoking Status    Never   Smokeless Tobacco    Never       VITALS:  There were no vitals filed for this visit.  LMP  (LMP Unknown)   Breastfeeding No    Estimated body mass index is 17.16 kg/m  as calculated from the following:    Height as of 6/29/20: 1.626 m (5' 4\").    Weight as of 6/29/20: 45.4 kg (100 lb).    PHYSICAL " "EXAM:  (observations via Phone)  Hard of hearing.  Defers to     MEDICATIONS  Current Outpatient Medications   Medication Sig Dispense Refill    acitretin (SORIATANE) 10 MG CAPS capsule Take 10 mg by mouth daily      donepezil (ARICEPT) 10 MG tablet Take 10 mg by mouth at bedtime      eszopiclone (LUNESTA) 2 MG tablet TAKE 1 TABLET(2 MG) BY MOUTH EVERY NIGHT AS NEEDED FOR SLEEP 90 tablet 1    famotidine (PEPCID) 20 MG tablet TAKE 1 TABLET(20 MG) BY MOUTH DAILY BEFORE BREAKFAST 90 tablet 1    Multiple Vitamins-Minerals (WOMENS MULTIVITAMIN) TABS Take 1 tablet by mouth daily      PARoxetine (PAXIL) 20 MG tablet Take 1 tablet (20 mg) by mouth every morning 90 tablet 3    risperiDONE (RISPERDAL) 0.25 MG tablet Take 1 tablet (0.25 mg) by mouth every morning 90 tablet 3    risperiDONE (RISPERDAL) 0.5 MG tablet Take 1 tablet (0.5 mg) by mouth at bedtime. May also take 1 tablet (0.5 mg) every 8 hours as needed (anxiety). 180 tablet 3       Notes summarized: Dementia clinic x 2  Labs, x-rays, cardiology, GI tests reviewed:   Recent Labs   Lab Test 03/16/23  1019   HGB 13.1   WBC 3.9*      POTASSIUM 4.3   CR 0.67   B12 605   TSH 1.46   CRPI <3.00     No results found for: \"XYAXS40WLD\"  Lab Results   Component Value Date    CHOL 179 04/12/2017     New orders:   Orders Placed This Encounter   Procedures    Hemoglobin A1c    Comprehensive metabolic panel    TSH    Ferritin    Vitamin B12    CBC with Platelets & Differential       Independent review of:  Patient would like to receive their AVS by Amisha    Phone-Visit Details  Type of service:  Phone Visit      7/5/2024    10:50 AM   Additional Questions   Roomed by la   Accompanied by alone         7/5/2024    10:50 AM   Patient Reported Additional Medications   Patient reports taking the following new medications none     Patient has given verbal consent to a Phone visit?  Yes  How would you like to obtain your AVS?  Amisha  Will anyone else be joining your " phone visit, giving supplemental history?  Yes,  Judson  Originating location (pt location): Home    Distant Location (provider location):  Off-site    Phone Start Time: 3:32 PM  Phone End time:  3:59 PM  Conversation plus orders: 27 minutes  Dictation time:  3 minutes    The visit lasted a total of 30 minutes     Aries Tovar MD  Internal Medicine  [facility]

## 2024-07-05 NOTE — PROGRESS NOTES
Marley is a 83 year old who is being evaluated via a billable telephone visit.    {ROOMING STAFF complete during rooming of virtual visit (Optional):334269}  Originating Location (pt. Location): Home  {PROVIDER LOCATION On-site should be selected for visits conducted from your clinic location or adjoining [facility] hospital, academic office, or other nearby [facility] building. Off-site should be selected for all other provider locations, including home:073793}  Distant Location (provider location):  Off-site    {PROVIDER CHARTING PREFERENCE:473366}    Subjective   Marley is a 83 year old, presenting for the following health issues:  office visit and Recheck Medication (Pt reports that she is having this visit to discuss feeling light headed all the time for months.)      7/5/2024    10:50 AM   Additional Questions   Roomed by la   Accompanied by eunice         7/5/2024    10:50 AM   Patient Reported Additional Medications   Patient reports taking the following new medications none     History of Present Illness       Reason for visit:  Lightheadness and tiredness  Symptom onset:  More than a month  Symptoms include:  Lightheadness and tiredness  Symptom intensity:  Moderate  Symptom progression:  Worsening  Had these symptoms before:  Yes  Has tried/received treatment for these symptoms:  No  What makes it worse:  No  What makes it better:  No    She eats 2-3 servings of fruits and vegetables daily.She consumes 0 sweetened beverage(s) daily.She exercises with enough effort to increase her heart rate 20 to 29 minutes per day.  She exercises with enough effort to increase her heart rate 4 days per week.   She is taking medications regularly.       {SUPERLIST (Optional):439210}  {additonal problems for provider to add (Optional):854680}    {ROS Picklists (Optional):928029}      Objective           Vitals:  No vitals were obtained today due to virtual visit.    Physical Exam   General: Alert and no distress //Respiratory: No audible  wheeze, cough, or shortness of breath // Psychiatric:  Appropriate affect, tone, and pace of words      {Diagnostic Test Results (Optional):991030}      Phone call duration: *** minutes  Signed Electronically by: Aries Tovar MD  {Email feedback regarding this note to [email or web address]   :541216}

## 2024-07-06 ENCOUNTER — LAB (OUTPATIENT)
Dept: LAB | Facility: CLINIC | Age: 84
End: 2024-07-06
Payer: MEDICARE

## 2024-07-06 DIAGNOSIS — R53.83 FATIGUE, UNSPECIFIED TYPE: ICD-10-CM

## 2024-07-06 DIAGNOSIS — K31.9 DISEASE OF STOMACH AND DUODENUM, UNSPECIFIED: ICD-10-CM

## 2024-07-06 DIAGNOSIS — Z13.1 ENCOUNTER FOR SCREENING FOR DIABETES MELLITUS: ICD-10-CM

## 2024-07-06 LAB
ALBUMIN SERPL BCG-MCNC: 3.8 G/DL (ref 3.5–5.2)
ALP SERPL-CCNC: 70 U/L (ref 40–150)
ALT SERPL W P-5'-P-CCNC: 12 U/L (ref 0–50)
ANION GAP SERPL CALCULATED.3IONS-SCNC: 8 MMOL/L (ref 7–15)
AST SERPL W P-5'-P-CCNC: 25 U/L (ref 0–45)
BASOPHILS # BLD AUTO: 0.1 10E3/UL (ref 0–0.2)
BASOPHILS NFR BLD AUTO: 1 %
BILIRUB SERPL-MCNC: 0.3 MG/DL
BUN SERPL-MCNC: 17.2 MG/DL (ref 8–23)
CALCIUM SERPL-MCNC: 9.1 MG/DL (ref 8.8–10.2)
CHLORIDE SERPL-SCNC: 104 MMOL/L (ref 98–107)
CREAT SERPL-MCNC: 0.74 MG/DL (ref 0.51–0.95)
DEPRECATED HCO3 PLAS-SCNC: 25 MMOL/L (ref 22–29)
EGFRCR SERPLBLD CKD-EPI 2021: 79 ML/MIN/1.73M2
EOSINOPHIL # BLD AUTO: 0.7 10E3/UL (ref 0–0.7)
EOSINOPHIL NFR BLD AUTO: 10 %
ERYTHROCYTE [DISTWIDTH] IN BLOOD BY AUTOMATED COUNT: 13.7 % (ref 10–15)
FERRITIN SERPL-MCNC: 57 NG/ML (ref 11–328)
GLUCOSE SERPL-MCNC: 89 MG/DL (ref 70–99)
HBA1C MFR BLD: 5.4 % (ref 0–5.6)
HCT VFR BLD AUTO: 40.8 % (ref 35–47)
HGB BLD-MCNC: 12.9 G/DL (ref 11.7–15.7)
IMM GRANULOCYTES # BLD: 0 10E3/UL
IMM GRANULOCYTES NFR BLD: 0 %
LYMPHOCYTES # BLD AUTO: 0.9 10E3/UL (ref 0.8–5.3)
LYMPHOCYTES NFR BLD AUTO: 13 %
MCH RBC QN AUTO: 28.9 PG (ref 26.5–33)
MCHC RBC AUTO-ENTMCNC: 31.6 G/DL (ref 31.5–36.5)
MCV RBC AUTO: 92 FL (ref 78–100)
MONOCYTES # BLD AUTO: 0.7 10E3/UL (ref 0–1.3)
MONOCYTES NFR BLD AUTO: 10 %
NEUTROPHILS # BLD AUTO: 4.6 10E3/UL (ref 1.6–8.3)
NEUTROPHILS NFR BLD AUTO: 66 %
PLATELET # BLD AUTO: 279 10E3/UL (ref 150–450)
POTASSIUM SERPL-SCNC: 4.5 MMOL/L (ref 3.4–5.3)
PROT SERPL-MCNC: 6.5 G/DL (ref 6.4–8.3)
RBC # BLD AUTO: 4.46 10E6/UL (ref 3.8–5.2)
SODIUM SERPL-SCNC: 137 MMOL/L (ref 135–145)
TSH SERPL DL<=0.005 MIU/L-ACNC: 1.72 UIU/ML (ref 0.3–4.2)
VIT B12 SERPL-MCNC: 844 PG/ML (ref 232–1245)
WBC # BLD AUTO: 7 10E3/UL (ref 4–11)

## 2024-07-06 PROCEDURE — 85025 COMPLETE CBC W/AUTO DIFF WBC: CPT

## 2024-07-06 PROCEDURE — 84443 ASSAY THYROID STIM HORMONE: CPT

## 2024-07-06 PROCEDURE — 36415 COLL VENOUS BLD VENIPUNCTURE: CPT

## 2024-07-06 PROCEDURE — 80053 COMPREHEN METABOLIC PANEL: CPT

## 2024-07-06 PROCEDURE — 83036 HEMOGLOBIN GLYCOSYLATED A1C: CPT

## 2024-07-06 PROCEDURE — 82728 ASSAY OF FERRITIN: CPT

## 2024-07-06 PROCEDURE — 82607 VITAMIN B-12: CPT

## 2024-07-08 DIAGNOSIS — D64.9 ANEMIA, UNSPECIFIED TYPE: ICD-10-CM

## 2024-07-08 DIAGNOSIS — R53.83 FATIGUE, UNSPECIFIED TYPE: Primary | ICD-10-CM

## 2024-07-08 RX ORDER — FERROUS SULFATE 325(65) MG
325 TABLET, DELAYED RELEASE (ENTERIC COATED) ORAL DAILY
Qty: 100 TABLET | Refills: 0 | Status: SHIPPED | OUTPATIENT
Start: 2024-07-08 | End: 2024-10-16

## 2024-07-16 ENCOUNTER — TRANSFERRED RECORDS (OUTPATIENT)
Dept: HEALTH INFORMATION MANAGEMENT | Facility: CLINIC | Age: 84
End: 2024-07-16
Payer: COMMERCIAL

## 2024-09-20 ENCOUNTER — TRANSFERRED RECORDS (OUTPATIENT)
Dept: HEALTH INFORMATION MANAGEMENT | Facility: CLINIC | Age: 84
End: 2024-09-20
Payer: MEDICARE

## 2024-09-25 ENCOUNTER — VIRTUAL VISIT (OUTPATIENT)
Dept: INTERNAL MEDICINE | Facility: CLINIC | Age: 84
End: 2024-09-25
Payer: MEDICARE

## 2024-09-25 DIAGNOSIS — G30.1 ALZHEIMER'S DISEASE WITH LATE ONSET (CODE) (H): ICD-10-CM

## 2024-09-25 DIAGNOSIS — F41.1 GAD (GENERALIZED ANXIETY DISORDER): Primary | ICD-10-CM

## 2024-09-25 PROBLEM — F02.B0 MODERATE LATE ONSET ALZHEIMER'S DEMENTIA WITHOUT BEHAVIORAL DISTURBANCE, PSYCHOTIC DISTURBANCE, MOOD DISTURBANCE, OR ANXIETY (H): Status: ACTIVE | Noted: 2023-11-27

## 2024-09-25 PROCEDURE — 99443 PR PHYSICIAN TELEPHONE EVALUATION 21-30 MIN: CPT | Mod: 93 | Performed by: INTERNAL MEDICINE

## 2024-09-25 RX ORDER — RISPERIDONE 0.25 MG/1
.25-.5 TABLET ORAL EVERY 8 HOURS PRN
Qty: 90 TABLET | Refills: 3 | Status: SHIPPED | OUTPATIENT
Start: 2024-09-25

## 2024-09-25 RX ORDER — RIVASTIGMINE 9.5 MG/24H
PATCH, EXTENDED RELEASE TRANSDERMAL
COMMUNITY

## 2024-09-25 RX ORDER — RIVASTIGMINE 4.6 MG/24H
PATCH, EXTENDED RELEASE TRANSDERMAL
COMMUNITY
Start: 2024-07-31 | End: 2024-09-25

## 2024-09-25 RX ORDER — PAROXETINE 10 MG/1
10 TABLET, FILM COATED ORAL AT BEDTIME
Qty: 90 TABLET | Refills: 3 | Status: SHIPPED | OUTPATIENT
Start: 2024-09-25 | End: 2025-09-25

## 2024-09-25 NOTE — PATIENT INSTRUCTIONS
Continue paroxetine at bedtime but okay to decrease to 10 mg    Continue risperidone 0.5 mg at bedtime    Take risperidone 0.25 mg - 1 to 2 tablets every 8 hours as needed    Taking Exelon patch noted and medicine list clarified    Change from Aricept to Exelon noted    Taking Lunesta just as needed

## 2024-09-25 NOTE — PROGRESS NOTES
OFFICE VISIT--Phone    Marley is a 84 year old female being evaluated via a billable phone visit, and would like to be contacted via the following  Home number [phone] (home)    ASSESSMENT and PLAN:  1. CATHY (generalized anxiety disorder)  Discussed trial of decreased paroxetine.  Discussed as needed use of risperidone.  Neurology notes reviewed  - PARoxetine (PAXIL) 10 MG tablet; Take 1 tablet (10 mg) by mouth at bedtime.  Dispense: 90 tablet; Refill: 3  - risperiDONE (RISPERDAL) 0.25 MG tablet; Take 1-2 tablets (0.25-0.5 mg) by mouth every 8 hours as needed (anxiety).  Dispense: 90 tablet; Refill: 3    2. Alzheimer's disease with late onset (CODE) (H)  Reviewed agitated episode in the emergency room last month       Patient Instructions   Continue paroxetine at bedtime but okay to decrease to 10 mg    Continue risperidone 0.5 mg at bedtime    Take risperidone 0.25 mg - 1 to 2 tablets every 8 hours as needed    Taking Exelon patch noted and medicine list clarified    Change from Aricept to Exelon noted    Taking Lunesta just as needed            Return in about 4 months (around 1/25/2025) for using a video visit.       CHIEF COMPLAINT:  Chief Complaint   Patient presents with    Recheck Medication       HISTORY OF PRESENT ILLNESS:  Marley is a 84 year old female contacting the clinic today via phone for questions regarding medication.  Has now seen neurology.  Aricept changed to Exelon patch.  Discussed in July trial of decreased Paxil or stopping.  Apparently became more agitated and added back.   has questions about whether we should decrease Paxil.  Energy has improved with cessation of morning medication and changing the remaining to bedtime.  Spent 8 hours in the emergency room with an agitated episode last month.  Discussed nightly use of Risperdal and a lower dose during the day as needed    Wife concerned that Paxil treats depression and she is not depressed.  Discussed role for  "anxiety    HPI    REVIEW OF SYSTEMS:  Sleeps well    Today's PHQ-2 Score:       7/5/2024    10:51 AM   PHQ-2 ( 1999 Pfizer)   Q1: Little interest or pleasure in doing things 0   Q2: Feeling down, depressed or hopeless 0   PHQ-2 Score 0       PFSH:  Social History     Social History Narrative     to  Judson    Enjoys tennis and golf    3 boys     Going to [place] in the [facility] for a wedding tomorrow    TOBACCO USE:  History   Smoking Status    Never   Smokeless Tobacco    Never       VITALS:  There were no vitals filed for this visit.  LMP  (LMP Unknown)    Estimated body mass index is 17.16 kg/m  as calculated from the following:    Height as of 6/29/20: 1.626 m (5' 4\").    Weight as of 6/29/20: 45.4 kg (100 lb).    PHYSICAL EXAM:  (observations via Phone)  Alert.  Defers to     MEDICATIONS  Current Outpatient Medications   Medication Sig Dispense Refill    ascorbic acid 1000 MG TABS tablet Take 1 tablet by mouth daily.      CALCIUM-MAGNESIUM-ZINC PO Take 1 tablet by mouth daily.      PARoxetine (PAXIL) 10 MG tablet Take 1 tablet (10 mg) by mouth at bedtime. 90 tablet 3    risperiDONE (RISPERDAL) 0.25 MG tablet Take 1-2 tablets (0.25-0.5 mg) by mouth every 8 hours as needed (anxiety). 90 tablet 3    vitamin D3 (CHOLECALCIFEROL) 125 MCG (5000 UT) tablet Take 1 tablet by mouth daily.      acitretin (SORIATANE) 10 MG CAPS capsule Take 10 mg by mouth daily      eszopiclone (LUNESTA) 2 MG tablet TAKE 1 TABLET(2 MG) BY MOUTH EVERY NIGHT AS NEEDED FOR SLEEP 90 tablet 1    famotidine (PEPCID) 20 MG tablet TAKE 1 TABLET(20 MG) BY MOUTH DAILY BEFORE BREAKFAST 90 tablet 1    ferrous sulfate (FE TABS) 325 (65 Fe) MG EC tablet Take 1 tablet (325 mg) by mouth daily for 100 days 100 tablet 0    Multiple Vitamins-Minerals (WOMENS MULTIVITAMIN) TABS Take 1 tablet by mouth daily      risperiDONE (RISPERDAL) 0.5 MG tablet Take 1 tablet (0.5 mg) by mouth at bedtime. May also take 1 tablet (0.5 mg) every 8 " "hours as needed (anxiety). 180 tablet 3    rivastigmine (EXELON) 9.5 MG/24HR 24 hr patch APPLY 1 PATCH TOPICALLY TO THE SKIN DAILY BEGINNING AFTER COMPLETION OF THE 4.6 MG / 24 HOUR PATCH REGIMEN         Notes summarized: Neurology note x 2.  Emergency room note x 1  Labs, x-rays, cardiology, GI tests reviewed:   Recent Labs   Lab Test 07/06/24  1156 03/16/23  1019   HGB 12.9 13.1   WBC 7.0 3.9*    136   POTASSIUM 4.5 4.3   CR 0.74 0.67   A1C 5.4  --    B12 844 605   TSH 1.72 1.46   CRPI  --  <3.00     No results found for: \"[id number]\"  Lab Results   Component Value Date    CHOL 179 04/12/2017     New orders: No orders of the defined types were placed in this encounter.      Independent review of:  Patient would like to receive their AVS by Drivy    Phone-Visit Details  Type of service:  Phone Visit      7/5/2024    10:50 AM   Additional Questions   Roomed by la   Accompanied by alone     Patient has given verbal consent to a Phone visit?  Yes  How would you like to obtain your AVS?  Drivy  Will anyone else be joining your phone visit, giving supplemental history?  Yes,  Judson  Originating location (pt location): Home    Distant Location (provider location):  Off-site    Phone Start Time: 3:31 PM  Phone End time:  3:58 PM  Conversation plus orders: 27 minutes  Dictation time:  3 minutes    The visit lasted a total of 30 minutes     Aries Tovar MD  Internal Medicine  [facility]"

## 2024-10-15 ENCOUNTER — TRANSFERRED RECORDS (OUTPATIENT)
Dept: HEALTH INFORMATION MANAGEMENT | Facility: CLINIC | Age: 84
End: 2024-10-15
Payer: MEDICARE

## 2024-10-18 DIAGNOSIS — R53.83 FATIGUE, UNSPECIFIED TYPE: ICD-10-CM

## 2024-10-18 DIAGNOSIS — D64.9 ANEMIA, UNSPECIFIED TYPE: ICD-10-CM

## 2024-10-20 ENCOUNTER — HEALTH MAINTENANCE LETTER (OUTPATIENT)
Age: 84
End: 2024-10-20

## 2024-10-21 ENCOUNTER — TELEPHONE (OUTPATIENT)
Dept: INTERNAL MEDICINE | Facility: CLINIC | Age: 84
End: 2024-10-21
Payer: MEDICARE

## 2024-10-21 DIAGNOSIS — R11.0 NAUSEA: ICD-10-CM

## 2024-10-21 RX ORDER — FERROUS SULFATE 325(65) MG
TABLET, DELAYED RELEASE (ENTERIC COATED) ORAL
Qty: 100 TABLET | Refills: 0 | OUTPATIENT
Start: 2024-10-21

## 2024-10-21 NOTE — TELEPHONE ENCOUNTER
Medication Question or Refill        What medication are you calling about (include dose and sig)?:    Disp Refills Start End ANDREA   famotidine (PEPCID) 20 MG tablet (Discontinued) 90 tablet 0 1/25/2024 4/24/2024 No   Sig - Route: Take 1 tablet (20 mg) by mouth daily before breakfast - Oral   Sent to pharmacy as: Famotidine 20 MG Oral Tablet (PEPCID)   Class: E-Prescribe   Notes to Pharmacy: Patient should have refills left on order sent 4/24/23 for one year supply. Please do NOT stack these orders.   Order: [id number]   E-Prescribing Status: Receipt confirmed by pharmacy (1/25/2024  2:59 PM CST)       Preferred Pharmacy:  [id number] - [place] [id number] AT [address] [place] [address]  [facility] [address]  Phone: [phone] Fax: [phone]      Controlled Substance Agreement on file:   CSA -- Patient Level:    CSA: None found at the patient level.       Who prescribed the medication?: Dr Tovar    Do you need a refill? Yes

## 2024-10-23 RX ORDER — FAMOTIDINE 20 MG/1
TABLET, FILM COATED ORAL
Qty: 90 TABLET | Refills: 3 | Status: SHIPPED | OUTPATIENT
Start: 2024-10-23

## 2024-12-03 DIAGNOSIS — F51.01 PRIMARY INSOMNIA: ICD-10-CM

## 2024-12-03 RX ORDER — ESZOPICLONE 2 MG/1
TABLET, FILM COATED ORAL
Qty: 90 TABLET | Refills: 1 | Status: SHIPPED | OUTPATIENT
Start: 2024-12-03

## 2025-04-08 ENCOUNTER — NURSE TRIAGE (OUTPATIENT)
Dept: NURSING | Facility: CLINIC | Age: 85
End: 2025-04-08
Payer: MEDICARE

## 2025-04-08 NOTE — TELEPHONE ENCOUNTER
"Nurse Triage SBAR    Is this a 2nd Level Triage? NO    Situation: Pt is having a panic attack and has dizziness, lightheadedness, nausea, and the feeling of heart racing over the last couple of hours.     Background: Pt was given 1.5mg PRN Risperdal over the last two hours and it has not been effective in helping alleviate her sx. Hx of ED visit for anxiety in the past per patient's 's report.     Assessment: Pt is currently lying down and states that she is \"shaking\". Pt has dizziness, lightheadedness, nausea, and the feeling of heart racing over the last couple of hours. The PRN medication available has not alleviated her sx    Pulse: 72; Pt's sister felt like there were skipped hearts beats when she was checking patient's pulse  BP: 135/86    Pt gave permission to speak to her , Judson and her sister, Eileen.   Pt's sister Eileen reports that she thinks that when patient gets anxiety it is \"new for her\" each time due to her Alzheimer's disease and it causes the sx to persist due to her forgetfulness.     Protocol Recommended Disposition:   Go to ED Now    Recommendation: ED now due to sx reported per protocol. Pt has no other medications available at home to help alleviate her sx. Writer recommended patient follow up with her PCP after ED visit as well to assist in ongoing management of sx at home.      Note to clinic: Routed to provider due to consent to communicate on file does not have any boxes checked to indicate what can be discussed with patient's .     Reason for Disposition   [1] Lightheadedness or dizziness AND [2] persists > 10 minutes AND [3] not relieved by reassurance provided by triager    Additional Information   Negative: SEVERE difficulty breathing (e.g., struggling for each breath, speaks in single words)   Negative: Bluish (or gray) lips or face now   Negative: Difficult to awaken or acting confused (e.g., disoriented, slurred speech)   Negative: Violent behavior, or " threatening to physically hurt or kill someone   Negative: Sounds like a life-threatening emergency to the triager   Negative: Chest pain   Negative: [1] Difficulty breathing AND [2] persists > 10 minutes AND [3] not relieved by reassurance provided by triager    Protocols used: Anxiety and Panic Attack-MARIALUISA-  Karen Silverio RN   Triage Nurse Advisor on 4/8/2025 at 6:40 PM

## 2025-04-09 ENCOUNTER — TELEPHONE (OUTPATIENT)
Dept: INTERNAL MEDICINE | Facility: CLINIC | Age: 85
End: 2025-04-09
Payer: MEDICARE

## 2025-04-09 NOTE — TELEPHONE ENCOUNTER
Called and they declined scheduling a hosp f/u.  Pt is ok for now, but if anything changes, they will call back and speak to the care team to schedule.        Elaine Loco, CIERA  [facility] Pool56 minutes ago (7:35 AM)     BB  Please schedule patient for ED follow up with PCP.

## 2025-08-26 ENCOUNTER — TRANSFERRED RECORDS (OUTPATIENT)
Dept: HEALTH INFORMATION MANAGEMENT | Facility: CLINIC | Age: 85
End: 2025-08-26
Payer: MEDICARE

## 2025-08-27 DIAGNOSIS — F41.1 GAD (GENERALIZED ANXIETY DISORDER): ICD-10-CM

## 2025-08-28 RX ORDER — PAROXETINE 10 MG/1
10 TABLET, FILM COATED ORAL AT BEDTIME
Qty: 90 TABLET | Refills: 3 | Status: SHIPPED | OUTPATIENT
Start: 2025-08-28